# Patient Record
Sex: FEMALE | Race: ASIAN | NOT HISPANIC OR LATINO | Employment: FULL TIME | URBAN - METROPOLITAN AREA
[De-identification: names, ages, dates, MRNs, and addresses within clinical notes are randomized per-mention and may not be internally consistent; named-entity substitution may affect disease eponyms.]

---

## 2017-01-18 ENCOUNTER — OFFICE VISIT (OUTPATIENT)
Dept: FAMILY MEDICINE | Facility: CLINIC | Age: 27
End: 2017-01-18

## 2017-01-18 VITALS
SYSTOLIC BLOOD PRESSURE: 105 MMHG | BODY MASS INDEX: 26.39 KG/M2 | DIASTOLIC BLOOD PRESSURE: 71 MMHG | TEMPERATURE: 97.7 F | WEIGHT: 139.8 LBS | HEART RATE: 64 BPM | HEIGHT: 61 IN

## 2017-01-18 DIAGNOSIS — J30.2 SEASONAL ALLERGIC RHINITIS, UNSPECIFIED ALLERGIC RHINITIS TRIGGER: Primary | ICD-10-CM

## 2017-01-18 DIAGNOSIS — K03.6 BETEL DEPOSIT ON TEETH: ICD-10-CM

## 2017-01-18 NOTE — PROGRESS NOTES
"       HPI     Carlos Ivan is a 26 year old  female with a PMH significant for asthma who presents with pain underneath her chin. Pain has been present for about 2-3 months. This first began as a \"sore throat\" and then the pain moved to the area beneath her chin. Pain is brought on with pressure, when she grabs the skin under her chin and is sharp but does not radiate anywhere. Has not tried anything for it specifically, but has used Tylenol for headaches and has not noticed any effect on that pain. Pain has remained the same over the last 2 months.     She also has a cough, which has been present for a few years. She was diagnosed with asthma as a child and has an inhaler at home that she uses as needed. Cough usually occurs at bedtime and in the morning. Also has cough at night that will wake her 2-3 times per week. Cough is usually dry, sometimes produces white sputum. Sometimes uses 2 pillows at bedtime to help with cough. Has not had any fevers, chills, weight loss, rhinorrhea, nausea, vomiting, diarrhea, constipation, reflux. She has gone to another clinic in [place] that has done some pulmonary function tests. Was also seen in November 2016 for symptoms and was given a medication to take for 4 days that did not help. Does not recall the name of the medication but still has the packet at home. Does not recall if the ever created a type of asthma action plan.     Of note, patient does chew Betel nut almost daily.    ROS: 10 point ROS neg other than the symptoms noted above in the HPI.    PMH, Medications and Allergies were reviewed and updated as needed.     A Aura  was used for this visit.      Social History     Social History     Marital Status:      Spouse Name: N/A     Number of Children: 2     Years of Education: N/A     Social History Main Topics     Smoking status: Never Smoker      Smokeless tobacco: None     Alcohol Use: None     Drug Use: None     Sexual Activity: Not Asked     Other " "Topics Concern     None     Social History Narrative     None            Physical Exam:     Filed Vitals:    01/18/17 1333   BP: 105/71   Pulse: 64   Temp: 97.7  F (36.5  C)   Height: 5' 0.5\" (153.7 cm)   Weight: 139 lb 12.8 oz (63.413 kg)     Body mass index is 26.84 kg/(m^2).    Exam:  Constitutional: healthy, alert and no distress  Neck: Neck supple. No adenopathy. Thyroid symmetric, normal size, mobile with swallowing.  Eyes: EOMI, conjunctiva are clear.  ENT: No nasal discharge. Oropharynx clear with no erythema or exudates. Red staining along teeth.  Cardiovascular:RRR. No murmurs, clicks gallops or rub. No peripheral edema.  Respiratory:  Normal respiratory rate and rhythm, lungs clear to auscultation. No wheezes or crackles.  Abdomen: +BS, soft, nontender, nondistended.   Extremities: No C/C/E in BLE. 2+DP pulses.  Joint exam without erythema, effusion and full ROM throughout.  Skin: No rashes.  Psychiatric: mentation appears normal and affect normal/bright      Assessment and Plan     Carlos Ivan is a 26 year old female with a history of asthma who present with 2 months of pain underneath chin and intermittent cough that has been present for years.     Patient Instructions   -You may have allergies that are causing congestion.    -Doesn't look like an infection under your chin.    -Will give you Naproxen for your neck when it is giving you pain.    -Recommend staying away from Betel Nut.  It is real hard on your teeth and gums.  May cause Cancer.    -Take the Naproxen and Claritin for 2 weeks and see if it gets better.         Options for treatment and/or follow-up care were reviewed with the patient. Carlos Ivan was engaged and actively involved in the decision making process. She verbalized understanding of the options discussed and was satisfied with the final plan.    I acted as a scribe for Lito Berger MD during this visit.    Geeta Cedeño MS3        "

## 2017-01-18 NOTE — PROGRESS NOTES
The medical student acted as a scribe and the encounter documented above was performed completely by me and the documentation accurately reflects the work I have performed today. Lito Berger MD

## 2017-01-18 NOTE — PATIENT INSTRUCTIONS
-You may have allergies that are causing congestion.    -Doesn't look like an infection under your chin.    -Will give you Naproxen for your neck when it is giving you pain.    -Recommend staying away from Betel Nut.  It is real hard on your teeth and gums.  May cause Cancer.    -Take the Naproxen and Claritin for 2 weeks and see if it gets better.

## 2017-01-18 NOTE — MR AVS SNAPSHOT
After Visit Summary   2017    Carlos Ivan    MRN: [id number]           Patient Information     Date Of Birth          1990        Visit Information        Provider Department      2017 1:50 PM Lito Berger MD [facility]        Care Instructions    -You may have allergies that are causing congestion.    -Doesn't look like an infection under your chin.    -Will give you Naproxen for your neck when it is giving you pain.    -Recommend staying away from Betel Nut.  It is real hard on your teeth and gums.  May cause Cancer.    -Take the Naproxen and Claritin for 2 weeks and see if it gets better.          Follow-ups after your visit        Who to contact     Please call your clinic at [phone] to:    Ask questions about your health    Make or cancel appointments    Discuss your medicines    Learn about your test results    Speak to your doctor   If you have compliments or concerns about an experience at your clinic, or if you wish to file a complaint, please contact [facility] Patient Relations at [phone] or email us at [email or web address]         Additional Information About Your Visit        MyChart Information     TrueNorthLogic is an electronic gateway that provides easy, online access to your medical records. With TrueNorthLogic, you can request a clinic appointment, read your test results, renew a prescription or communicate with your care team.     To sign up for TrueNorthLogic visit the website at [email or web address]   You will be asked to enter the access code listed below, as well as some personal information. Please follow the directions to create your username and password.     Your access code is: [email or web address]  Expires: 2017  2:11 PM     Your access code will  in 90 days. If you need help or a new code, please contact your [facility] or call [phone] for assistance.        Care EveryWhere ID  "    This is your Care EveryWhere ID. This could be used by other organizations to access your [place] medical records  [id number]        Your Vitals Were     Pulse Temperature Height BMI (Body Mass Index)          64 97.7  F (36.5  C) 5' 0.5\" (153.7 cm) 26.84 kg/m2         Blood Pressure from Last 3 Encounters:   01/18/17 105/71    Weight from Last 3 Encounters:   01/18/17 139 lb 12.8 oz (63.413 kg)              Today, you had the following     No orders found for display       Primary Care Provider Office Phone # Fax #    Patricia Bee [phone]       [address] [address]        Thank you!     Thank you for choosing [facility]  for your care. Our goal is always to provide you with excellent care. Hearing back from our patients is one way we can continue to improve our services. Please take a few minutes to complete the written survey that you may receive in the mail after your visit with us. Thank you!             Your Updated Medication List - Protect others around you: Learn how to safely use, store and throw away your medicines at www.disposemymeds.org.      Notice  As of 1/18/2017  2:11 PM    You have not been prescribed any medications.      "

## 2017-01-20 RX ORDER — NAPROXEN 500 MG/1
500 TABLET ORAL 2 TIMES DAILY PRN
Qty: 60 TABLET | Refills: 1 | Status: SHIPPED | OUTPATIENT
Start: 2017-01-20 | End: 2017-02-17

## 2017-01-20 RX ORDER — LORATADINE 10 MG/1
10 TABLET ORAL DAILY
Qty: 30 TABLET | Refills: 1 | Status: SHIPPED | OUTPATIENT
Start: 2017-01-20 | End: 2017-11-30

## 2017-01-31 PROBLEM — J45.909 ASTHMA: Status: ACTIVE | Noted: 2017-01-31

## 2017-02-17 DIAGNOSIS — K03.6 BETEL DEPOSIT ON TEETH: ICD-10-CM

## 2017-02-20 RX ORDER — NAPROXEN 500 MG/1
500 TABLET ORAL 2 TIMES DAILY PRN
Qty: 60 TABLET | Refills: 1 | Status: SHIPPED
Start: 2017-02-20 | End: 2017-11-30

## 2017-03-22 ENCOUNTER — ALLIED HEALTH/NURSE VISIT (OUTPATIENT)
Dept: FAMILY MEDICINE | Facility: CLINIC | Age: 27
End: 2017-03-22

## 2017-03-22 VITALS
BODY MASS INDEX: 27.93 KG/M2 | TEMPERATURE: 98.3 F | HEART RATE: 88 BPM | DIASTOLIC BLOOD PRESSURE: 79 MMHG | SYSTOLIC BLOOD PRESSURE: 122 MMHG | WEIGHT: 145.4 LBS

## 2017-03-22 DIAGNOSIS — Z11.1 SCREENING EXAMINATION FOR PULMONARY TUBERCULOSIS: Primary | ICD-10-CM

## 2017-03-22 NOTE — NURSING NOTE
name: Kavon Baker  Language: Aura  Agency: [employer]  Phone number: [phone]    3/22/2017      Carlos Ka Paw  1990      Mantoux Placement:  Have you had a positive PPD/Mantoux before?No    Patient advised to avoid scratching area  Patient advised to return to clinic in 48-72 hours for interpretation.    Administered by . Melinda Webster      Mantoux result:  Lab Results   Component Value Date    PPD: REDNESS 5mm 03/24/2017    PPD:INDURATIO 0mm 03/24/2017

## 2017-03-22 NOTE — MR AVS SNAPSHOT
After Visit Summary   3/22/2017    Carlos Ivan    MRN: [id number]           Patient Information     Date Of Birth          1990        Visit Information        Provider Department      3/22/2017 10:00 AM Nurse, [facility]        Today's Diagnoses     Screening examination for pulmonary tuberculosis    -  1       Follow-ups after your visit        Follow-up notes from your care team     Return in about 2 days (around 3/24/2017).      Your next 10 appointments already scheduled     Mar 24, 2017 10:15 AM CDT   Nurse Visit with [facility] Nurse   [facility])    [address] [place] [id number]   [phone]              Who to contact     Please call your clinic at [phone] to:    Ask questions about your health    Make or cancel appointments    Discuss your medicines    Learn about your test results    Speak to your doctor   If you have compliments or concerns about an experience at your clinic, or if you wish to file a complaint, please contact [facility] Patient Relations at [phone] or email us at [email or web address]         Additional Information About Your Visit        MyChart Information     [employer] Are [employer]ters is an electronic gateway that provides easy, online access to your medical records. With [employer], you can request a clinic appointment, read your test results, renew a prescription or communicate with your care team.     To sign up for [employer] visit the website at [email or web address]   You will be asked to enter the access code listed below, as well as some personal information. Please follow the directions to create your username and password.     Your access code is: [email or web address]  Expires: 2017  3:11 PM     Your access code will  in 90 days. If you need help or a new code, please contact your [facility] or call [phone] for assistance.        Care EveryWhere ID      This is your Care EveryWhere ID. This could be used by other organizations to access your [place] medical records  [id number]        Your Vitals Were     Pulse Temperature BMI (Body Mass Index)             88 98.3  F (36.8  C) (Oral) 27.93 kg/m2          Blood Pressure from Last 3 Encounters:   03/22/17 122/79   01/18/17 105/71    Weight from Last 3 Encounters:   03/22/17 145 lb 6.4 oz (66 kg)   01/18/17 139 lb 12.8 oz (63.4 kg)              We Performed the Following     tuberculin (Mantoux, PPD) 5 UNIT/0.1ML ID injection (Charge)        Primary Care Provider Office Phone # Fax #    Patricia Bee [phone]       [address] [address]        Thank you!     Thank you for choosing [facility]  for your care. Our goal is always to provide you with excellent care. Hearing back from our patients is one way we can continue to improve our services. Please take a few minutes to complete the written survey that you may receive in the mail after your visit with us. Thank you!             Your Updated Medication List - Protect others around you: Learn how to safely use, store and throw away your medicines at www.disposemymeds.org.          This list is accurate as of: 3/22/17 10:00 AM.  Always use your most recent med list.                   Brand Name Dispense Instructions for use    loratadine 10 MG tablet    CLARITIN    30 tablet    Take 1 tablet (10 mg) by mouth daily       naproxen 500 MG tablet    NAPROSYN    60 tablet    Take 1 tablet (500 mg) by mouth 2 times daily as needed for moderate pain

## 2017-03-24 ENCOUNTER — ALLIED HEALTH/NURSE VISIT (OUTPATIENT)
Dept: FAMILY MEDICINE | Facility: CLINIC | Age: 27
End: 2017-03-24

## 2017-03-24 DIAGNOSIS — Z11.1 VISIT FOR MANTOUX TEST: Primary | ICD-10-CM

## 2017-03-24 LAB
PPDINDURATION: NORMAL MM (ref 0–5)
PPDREDNESS: NORMAL MM

## 2017-03-24 NOTE — MR AVS SNAPSHOT
After Visit Summary   3/24/2017    Carlos Ivan    MRN: [id number]           Patient Information     Date Of Birth          1990        Visit Information        Provider Department      3/24/2017 10:15 AM [facility]        Today's Diagnoses     Visit for Mantoux test    -  1       Follow-ups after your visit        Who to contact     Please call your clinic at [phone] to:    Ask questions about your health    Make or cancel appointments    Discuss your medicines    Learn about your test results    Speak to your doctor   If you have compliments or concerns about an experience at your clinic, or if you wish to file a complaint, please contact [facility] Patient Relations at [phone] or email us at [email or web address]         Additional Information About Your Visit        MyChart Information     silkfred is an electronic gateway that provides easy, online access to your medical records. With silkfred, you can request a clinic appointment, read your test results, renew a prescription or communicate with your care team.     To sign up for [employer] visit the website at [email or web address]   You will be asked to enter the access code listed below, as well as some personal information. Please follow the directions to create your username and password.     Your access code is: [email or web address]  Expires: 2017  3:11 PM     Your access code will  in 90 days. If you need help or a new code, please contact your [facility] or call [phone] for assistance.        Care EveryWhere ID     This is your Care EveryWhere ID. This could be used by other organizations to access your [place] medical records  [id number]         Blood Pressure from Last 3 Encounters:   17 122/79   17 105/71    Weight from Last 3 Encounters:   17 145 lb 6.4 oz (66 kg)   17 139 lb 12.8 oz (63.4 kg)               Today, you had the following     No orders found for display       Primary Care Provider Office Phone # Fax #    Patricia Bee [phone]       [address] [address]        Thank you!     Thank you for choosing [facility]  for your care. Our goal is always to provide you with excellent care. Hearing back from our patients is one way we can continue to improve our services. Please take a few minutes to complete the written survey that you may receive in the mail after your visit with us. Thank you!             Your Updated Medication List - Protect others around you: Learn how to safely use, store and throw away your medicines at www.disposemymeds.org.          This list is accurate as of: 3/24/17 10:27 AM.  Always use your most recent med list.                   Brand Name Dispense Instructions for use    loratadine 10 MG tablet    CLARITIN    30 tablet    Take 1 tablet (10 mg) by mouth daily       naproxen 500 MG tablet    NAPROSYN    60 tablet    Take 1 tablet (500 mg) by mouth 2 times daily as needed for moderate pain

## 2017-03-24 NOTE — NURSING NOTE
name: Kavon Baker  Language: Aura  Agency: [employer]  Phone number: [phone]        Patient here for PPD read.  Results can be found in original placement encounter.    November Moncho, NANCY

## 2017-07-20 ENCOUNTER — OFFICE VISIT (OUTPATIENT)
Dept: FAMILY MEDICINE | Facility: CLINIC | Age: 27
End: 2017-07-20

## 2017-07-20 VITALS
HEIGHT: 61 IN | BODY MASS INDEX: 27 KG/M2 | DIASTOLIC BLOOD PRESSURE: 65 MMHG | WEIGHT: 143 LBS | HEART RATE: 76 BPM | SYSTOLIC BLOOD PRESSURE: 97 MMHG

## 2017-07-20 DIAGNOSIS — M54.50 ACUTE BILATERAL LOW BACK PAIN WITHOUT SCIATICA: Primary | ICD-10-CM

## 2017-07-20 DIAGNOSIS — S56.911A ELBOW STRAIN, RIGHT, INITIAL ENCOUNTER: ICD-10-CM

## 2017-07-20 RX ORDER — ACETAMINOPHEN 325 MG/1
650 TABLET ORAL EVERY 4 HOURS PRN
Qty: 30 TABLET | Refills: 0 | Status: SHIPPED | OUTPATIENT
Start: 2017-07-20 | End: 2019-07-23

## 2017-07-20 NOTE — PATIENT INSTRUCTIONS
1) Ice 10 minutes three time a day until clear    2) Tylenol,  As needed.    3) Recheck in 4 weeks IF not clear.
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
## 2017-07-20 NOTE — MR AVS SNAPSHOT
After Visit Summary   2017    Carlos Ivan    MRN: [id number]           Patient Information     Date Of Birth          1990        Visit Information        Provider Department      2017 1:10 PM Jose Luis Gabriel MD [facility]        Today's Diagnoses     Acute bilateral low back pain without sciatica    -  1    Elbow strain, right, initial encounter          Care Instructions    1) Ice 10 minutes three time a day until clear    2) Tylenol,  As needed.    3) Recheck in 4 weeks IF not clear.          Follow-ups after your visit        Who to contact     Please call your clinic at [phone] to:    Ask questions about your health    Make or cancel appointments    Discuss your medicines    Learn about your test results    Speak to your doctor   If you have compliments or concerns about an experience at your clinic, or if you wish to file a complaint, please contact [facility] Patient Relations at [phone] or email us at [email or web address]         Additional Information About Your Visit        MyChart Information     Next 1 Interactive is an electronic gateway that provides easy, online access to your medical records. With Next 1 Interactive, you can request a clinic appointment, read your test results, renew a prescription or communicate with your care team.     To sign up for Next 1 Interactive visit the website at [email or web address]   You will be asked to enter the access code listed below, as well as some personal information. Please follow the directions to create your username and password.     Your access code is: [email or web address]  Expires: 10/18/2017  2:12 PM     Your access code will  in 90 days. If you need help or a new code, please contact your [facility] or call [phone] for assistance.        Care EveryWhere ID     This is your Care EveryWhere ID. This could be used by other organizations to access your [facility]  "records  [id number]        Your Vitals Were     Pulse Height BMI (Body Mass Index)             76 5' 0.5\" (153.7 cm) 27.47 kg/m2          Blood Pressure from Last 3 Encounters:   07/20/17 97/65   03/22/17 122/79   01/18/17 105/71    Weight from Last 3 Encounters:   07/20/17 143 lb (64.9 kg)   03/22/17 145 lb 6.4 oz (66 kg)   01/18/17 139 lb 12.8 oz (63.4 kg)              Today, you had the following     No orders found for display         Today's Medication Changes          These changes are accurate as of: 7/20/17  2:12 PM.  If you have any questions, ask your nurse or doctor.               Start taking these medicines.        Dose/Directions    acetaminophen 325 MG tablet   Commonly known as:  TYLENOL   Used for:  Elbow strain, right, initial encounter, Acute bilateral low back pain without sciatica        Dose:  650 mg   Take 2 tablets (650 mg) by mouth every 4 hours as needed for mild pain   Quantity:  30 tablet   Refills:  0            Where to get your medicines      These medications were sent to [facility] - [place], [place] - [address], [place] [address]     Phone:  [phone]     acetaminophen 325 MG tablet                Primary Care Provider Office Phone # Fax #    Patricia Bee [phone]       [address] [address]        Equal Access to Services     AGNES ROSA AH: Hadii madelaine garciao Sofunmilayo, waaxda luqadaha, qaybta kaalmada adeegyada, michelle easley . So [facility] [phone].    ATENCIÓN: Si habla español, tiene a rollins disposición servicios gratuitos de asistencia lingüística. Michelle al [phone].    We comply with applicable federal civil rights laws and [place] laws. We do not discriminate on the basis of race, color, national origin, age, disability sex, sexual orientation or gender identity.            Thank you!     Thank you for choosing [facility]  for your care. Our goal is always " to provide you with excellent care. Hearing back from our patients is one way we can continue to improve our services. Please take a few minutes to complete the written survey that you may receive in the mail after your visit with us. Thank you!             Your Updated Medication List - Protect others around you: Learn how to safely use, store and throw away your medicines at www.disposemymeds.org.          This list is accurate as of: 7/20/17  2:12 PM.  Always use your most recent med list.                   Brand Name Dispense Instructions for use Diagnosis    acetaminophen 325 MG tablet    TYLENOL    30 tablet    Take 2 tablets (650 mg) by mouth every 4 hours as needed for mild pain    Elbow strain, right, initial encounter, Acute bilateral low back pain without sciatica       loratadine 10 MG tablet    CLARITIN    30 tablet    Take 1 tablet (10 mg) by mouth daily    Seasonal allergic rhinitis, unspecified allergic rhinitis trigger       naproxen 500 MG tablet    NAPROSYN    60 tablet    Take 1 tablet (500 mg) by mouth 2 times daily as needed for moderate pain    Betel deposit on teeth

## 2017-08-31 ENCOUNTER — OFFICE VISIT (OUTPATIENT)
Dept: FAMILY MEDICINE | Facility: CLINIC | Age: 27
End: 2017-08-31

## 2017-08-31 VITALS
TEMPERATURE: 98.2 F | SYSTOLIC BLOOD PRESSURE: 114 MMHG | BODY MASS INDEX: 27.47 KG/M2 | WEIGHT: 143 LBS | DIASTOLIC BLOOD PRESSURE: 80 MMHG | HEART RATE: 66 BPM

## 2017-08-31 DIAGNOSIS — L30.8 OTHER ECZEMA: Primary | ICD-10-CM

## 2017-08-31 RX ORDER — TRIAMCINOLONE ACETONIDE 1 MG/G
CREAM TOPICAL
Qty: 15 G | Refills: 1 | Status: SHIPPED | OUTPATIENT
Start: 2017-08-31 | End: 2017-11-30

## 2017-08-31 NOTE — PROGRESS NOTES
There are no exam notes on file for this visit.  Chief Complaint   Patient presents with     Derm Problem     left ankle, rash x1 year, itchy     Blood pressure 114/80, pulse 66, temperature 98.2  F (36.8  C), temperature source Oral, weight 143 lb (64.9 kg).  Patient comes in today for a rash on her left ankle.     The patient tells me that the rash started as a small area and gradually has grown she was scratched this quite a bit.  It has been present for about a year.  It is still quite itchy, and so she does scratch it with some frequency.  She has not had a previously evaluated.  It has been relatively stable in size over the past 6-9 months.    Objective: This is a well-nourished well-developed female who is in no acute distress.  Vital signs are as noted above, and are within normal limits.  Examination of her skin reveals a 3 cm lesion with superficial crusting and scaling.  The lesion is definitely intradermal.    Assessment: Skin lesion of unclear etiology    Plan: With the itch, scratching, and itching, I wonder if this is an eczematous lesion.  I will treat this with triamcinolone cream.  If the lesion does not completely resolve within 2 weeks, the patient should return to clinic.  Discussed a skin biopsy with the patient and I believe that would be my next step in working up this lesion if it does not respond to the topical steroids.    An  was present throughout the visit.  The patient is actively involved in decision-making process and all of her questions answered to her satisfaction.    Other eczema  -     triamcinolone (KENALOG) 0.1 % cream; Apply sparingly to affected area two times daily for 14 days.

## 2017-08-31 NOTE — PATIENT INSTRUCTIONS
Thank you for coming to [facility].  Please come back in two weeks if the rash is not completely gone  The phone number we will call with results is # [phone] (home) . If this is not the best number please call our clinic and change the number.  If you need any refills please call your pharmacy and they will contact us.  If you have any concerns about today's visit or wish to schedule another appointment please call our office during normal business hours [phone] (8-5:00 M-F)  If you have urgent medical concerns please call [phone] at any time of the day.  If you a medical emergency please call [phone]  Again thank you for choosing [facility] and please let us know how we can best partner with you to improve you and your family's health.

## 2017-08-31 NOTE — MR AVS SNAPSHOT
After Visit Summary   8/31/2017    Carlos Ivan    MRN: [id number]           Patient Information     Date Of Birth          1990        Visit Information        Provider Department      8/31/2017 8:40 AM Dell Win MD [facility]        Today's Diagnoses     Other eczema    -  1      Care Instructions    Thank you for coming to [facility].  Please come back in two weeks if the rash is not completely gone  The phone number we will call with results is # [phone] (home) . If this is not the best number please call our clinic and change the number.  If you need any refills please call your pharmacy and they will contact us.  If you have any concerns about today's visit or wish to schedule another appointment please call our office during normal business hours [phone] (8-5:00 M-F)  If you have urgent medical concerns please call [phone] at any time of the day.  If you a medical emergency please call [phone]  Again thank you for choosing [facility] and please let us know how we can best partner with you to improve you and your family's health.                  Follow-ups after your visit        Who to contact     Please call your clinic at [phone] to:    Ask questions about your health    Make or cancel appointments    Discuss your medicines    Learn about your test results    Speak to your doctor   If you have compliments or concerns about an experience at your clinic, or if you wish to file a complaint, please contact [facility] Patient Relations at [phone] or email us at [email or web address]         Additional Information About Your Visit        MyChart Information     MyFuelUp is an electronic gateway that provides easy, online access to your medical records. With MyFuelUp, you can request a clinic appointment, read your test results, renew a prescription or communicate with your care team.     To sign up for MyFuelUp  visit the website at [email or web address]   You will be asked to enter the access code listed below, as well as some personal information. Please follow the directions to create your username and password.     Your access code is: [email or web address]  Expires: 10/18/2017  2:12 PM     Your access code will  in 90 days. If you need help or a new code, please contact your [facility] or call [phone] for assistance.        Care EveryWhere ID     This is your Care EveryWhere ID. This could be used by other organizations to access your [place] medical records  [id number]        Your Vitals Were     Pulse Temperature BMI (Body Mass Index)             66 98.2  F (36.8  C) (Oral) 27.47 kg/m2          Blood Pressure from Last 3 Encounters:   17 114/80   17 97/65   17 122/79    Weight from Last 3 Encounters:   17 143 lb (64.9 kg)   17 143 lb (64.9 kg)   17 145 lb 6.4 oz (66 kg)              Today, you had the following     No orders found for display         Today's Medication Changes          These changes are accurate as of: 17  9:01 AM.  If you have any questions, ask your nurse or doctor.               Start taking these medicines.        Dose/Directions    triamcinolone 0.1 % cream   Commonly known as:  KENALOG   Used for:  Other eczema   Started by:  Dell Win MD        Apply sparingly to affected area two times daily for 14 days.   Quantity:  15 g   Refills:  1            Where to get your medicines      These [facility] to [email or web address] - [place], [place] - [address], [place] [address]     Phone:  [phone]     triamcinolone 0.1 % cream                Primary Care Provider Office Phone # Fax #    Patricia Bee [phone]       [address] [address]        Equal Access to Services     AGNES ROSA AH: fna Clay  harjeet luhherve pridemichelle fisher. So [facility] [phone].    ATENCIÓN: Si rachel sinclair, tiene a rollins disposición servicios gratuitos de asistencia lingüística. Michelle al [phone].    We comply with applicable federal civil rights laws and [place] laws. We do not discriminate on the basis of race, color, national origin, age, disability sex, sexual orientation or gender identity.            Thank you!     Thank you for choosing [facility]  for your care. Our goal is always to provide you with excellent care. Hearing back from our patients is one way we can continue to improve our services. Please take a few minutes to complete the written survey that you may receive in the mail after your visit with us. Thank you!             Your Updated Medication List - Protect others around you: Learn how to safely use, store and throw away your medicines at www.disposemymeds.org.          This list is accurate as of: 8/31/17  9:01 AM.  Always use your most recent med list.                   Brand Name Dispense Instructions for use Diagnosis    acetaminophen 325 MG tablet    TYLENOL    30 tablet    Take 2 tablets (650 mg) by mouth every 4 hours as needed for mild pain    Elbow strain, right, initial encounter, Acute bilateral low back pain without sciatica       loratadine 10 MG tablet    CLARITIN    30 tablet    Take 1 tablet (10 mg) by mouth daily    Seasonal allergic rhinitis, unspecified allergic rhinitis trigger       naproxen 500 MG tablet    NAPROSYN    60 tablet    Take 1 tablet (500 mg) by mouth 2 times daily as needed for moderate pain    Betel deposit on teeth       triamcinolone 0.1 % cream    KENALOG    15 g    Apply sparingly to affected area two times daily for 14 days.    Other eczema

## 2017-10-18 ENCOUNTER — ALLIED HEALTH/NURSE VISIT (OUTPATIENT)
Dept: FAMILY MEDICINE | Facility: CLINIC | Age: 27
End: 2017-10-18

## 2017-10-18 VITALS — TEMPERATURE: 98.9 F

## 2017-10-18 DIAGNOSIS — Z23 NEED FOR VACCINATION: Primary | ICD-10-CM

## 2017-10-18 NOTE — NURSING NOTE
" name: Kavon Shashi Baker  Language: Aura  Agency: [employer]  Phone number: [phone]      Injectable Influenza Immunization Documentation    1.  Has the patient received the information for the injectable influenza vaccine? YES     2. Is the patient 6 months of age or older? YES     3. Does the patient have any of the following contraindications?         Severe allergy to eggs? No     Severe allergic reaction to previous influenza vaccines? No   Severe allergy to latex? No       History of Guillain-[place] syndrome? No     Currently have a temperature greater than 100.4F? No        4.  Severely egg allergic patients should have flu vaccine eligibility assessed by an MD, RN, or pharmacist, and those who received flu vaccine should be observed for 15 min by an MD, RN, Pharmacist, Medical Technician, or member of clinic staff.\": YES    5. Latex-allergic patients should be given latex-free influenza vaccine Yes. Please reference the Vaccine latex table to determine if your clinic s product is latex-containing.       Vaccination given by November Paw, RMA                "

## 2017-10-18 NOTE — MR AVS SNAPSHOT
After Visit Summary   10/18/2017    Carlos vIan    MRN: [id number]           Patient Information     Date Of Birth          1990        Visit Information        Provider Department      10/18/2017 11:00 AM [facility]        Today's Diagnoses     Need for vaccination    -  1       Follow-ups after your visit        Who to contact     Please call your clinic at [phone] to:    Ask questions about your health    Make or cancel appointments    Discuss your medicines    Learn about your test results    Speak to your doctor   If you have compliments or concerns about an experience at your clinic, or if you wish to file a complaint, please contact [facility] Patient Relations at [phone] or email us at [email or web address]         Additional Information About Your Visit        MyChart Information     Visterra is an electronic gateway that provides easy, online access to your medical records. With Visterra, you can request a clinic appointment, read your test results, renew a prescription or communicate with your care team.     To sign up for [employer] visit the website at [email or web address]   You will be asked to enter the access code listed below, as well as some personal information. Please follow the directions to create your username and password.     Your access code is: [email or web address]  Expires: 10/18/2017  2:12 PM     Your access code will  in 90 days. If you need help or a new code, please contact your [facility] or call [phone] for assistance.        Care EveryWhere ID     This is your Care EveryWhere ID. This could be used by other organizations to access your [place] medical records  [id number]        Your Vitals Were     Temperature                   98.9  F (37.2  C) (Tympanic)            Blood Pressure from Last 3 Encounters:   17 114/80   17 97/65   17  122/79    Weight from Last 3 Encounters:   08/31/17 143 lb (64.9 kg)   07/20/17 143 lb (64.9 kg)   03/22/17 145 lb 6.4 oz (66 kg)              We Performed the Following     ADMIN VACCINE, INITIAL     FLU VAC QUADRIVLENT SPLIT VIRUS IM 0.5ml dosage        Primary Care Provider Office Phone # Fax #    Patricia Bee [phone]       [place] [address]        Equal Access to Services     AGNES ROSA : Hadii aad ku hadasho Soomaali, waaxda luqadaha, qaybta kaalmada adeegyada, waxay idiin hayaan adeeg shantell easley . So [facility] [phone].    ATENCIÓN: Si habla español, tiene a rollins disposición servicios gratuitos de asistencia lingüística. Llame al [phone].    We comply with applicable federal civil rights laws and [place] laws. We do not discriminate on the basis of race, color, national origin, age, disability, sex, sexual orientation, or gender identity.            Thank you!     Thank you for choosing [facility]  for your care. Our goal is always to provide you with excellent care. Hearing back from our patients is one way we can continue to improve our services. Please take a few minutes to complete the written survey that you may receive in the mail after your visit with us. Thank you!             Your Updated Medication List - Protect others around you: Learn how to safely use, store and throw away your medicines at www.disposemymeds.org.          This list is accurate as of: 10/18/17 11:36 AM.  Always use your most recent med list.                   Brand Name Dispense Instructions for use Diagnosis    acetaminophen 325 MG tablet    TYLENOL    30 tablet    Take 2 tablets (650 mg) by mouth every 4 hours as needed for mild pain    Elbow strain, right, initial encounter, Acute bilateral low back pain without sciatica       loratadine 10 MG tablet    CLARITIN    30 tablet    Take 1 tablet (10 mg) by mouth daily    Seasonal allergic rhinitis, unspecified allergic  rhinitis trigger       naproxen 500 MG tablet    NAPROSYN    60 tablet    Take 1 tablet (500 mg) by mouth 2 times daily as needed for moderate pain    Betel deposit on teeth       triamcinolone 0.1 % cream    KENALOG    15 g    Apply sparingly to affected area two times daily for 14 days.    Other eczema

## 2017-11-30 ENCOUNTER — OFFICE VISIT (OUTPATIENT)
Dept: FAMILY MEDICINE | Facility: CLINIC | Age: 27
End: 2017-11-30

## 2017-11-30 VITALS
DIASTOLIC BLOOD PRESSURE: 69 MMHG | HEART RATE: 73 BPM | HEIGHT: 61 IN | TEMPERATURE: 98 F | WEIGHT: 142.8 LBS | SYSTOLIC BLOOD PRESSURE: 107 MMHG | BODY MASS INDEX: 26.96 KG/M2

## 2017-11-30 DIAGNOSIS — M77.11 LATERAL EPICONDYLITIS OF RIGHT ELBOW: ICD-10-CM

## 2017-11-30 DIAGNOSIS — Z00.00 ROUTINE GENERAL MEDICAL EXAMINATION AT A HEALTH CARE FACILITY: Primary | ICD-10-CM

## 2017-11-30 DIAGNOSIS — H57.9 ITCHY EYES: ICD-10-CM

## 2017-11-30 RX ORDER — NAPROXEN 500 MG/1
500 TABLET ORAL 2 TIMES DAILY PRN
Qty: 60 TABLET | Refills: 1 | Status: SHIPPED | OUTPATIENT
Start: 2017-11-30 | End: 2017-12-14
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
## 2017-11-30 NOTE — PATIENT INSTRUCTIONS
Naproxen - 2 times a day for 2 weeks.    Arm band from a medical supply store to help with the tendonopathy.  Try this for the next 4-6 weeks, and if not improving return for re-evaluation.      Preventive Health Recommendations  Female Ages 26 - 39  Yearly exam:   See your health care provider every year in order to    Review health changes.     Discuss preventive care.      Review your medicines if you your doctor has prescribed any.    Until age 30: Get a Pap test every three years (more often if you have had an abnormal result).    After age 30: Talk to your doctor about whether you should have a Pap test every 3 years or have a Pap test with HPV screening every 5 years.   You do not need a Pap test if your uterus was removed (hysterectomy) and you have not had cancer.  You should be tested each year for STDs (sexually transmitted diseases), if you're at risk.   Talk to your provider about how often to have your cholesterol checked.  If you are at risk for diabetes, you should have a diabetes test (fasting glucose).  Shots: Get a flu shot each year. Get a tetanus shot every 10 years.   Nutrition:     Eat at least 5 servings of fruits and vegetables each day.    Eat whole-grain bread, whole-wheat pasta and brown rice instead of white grains and rice.    Talk to your provider about Calcium and Vitamin D.     Lifestyle    Exercise at least 150 minutes a week (30 minutes a day, 5 days of the week). This will help you control your weight and prevent disease.    Limit alcohol to one drink per day.    No smoking.     Wear sunscreen to prevent skin cancer.    See your dentist every six months for an exam and cleaning.

## 2017-11-30 NOTE — PROGRESS NOTES
Preceptor attestation:  Patient seen and discussed with the resident. Assessment and plan reviewed with resident and agreed upon.  Supervising physician: Jasbir Chery  [facility]

## 2017-11-30 NOTE — MR AVS SNAPSHOT
After Visit Summary   11/30/2017    Carlos Ivan    MRN: [id number]           Patient Information     Date Of Birth          1990        Visit Information        Provider Department      11/30/2017 11:00 AM Rosa Thomas DO [facility]        Today's Diagnoses     Routine general medical examination at a health care facility    -  1    Betel deposit on teeth        Itchy eyes        Lateral epicondylitis of right elbow          Care Instructions    Naproxen - 2 times a day for 2 weeks.    Arm band from a medical supply store to help with the tendonopathy.  Try this for the next 4-6 weeks, and if not improving return for re-evaluation.      Preventive Health Recommendations  Female Ages 26 - 39  Yearly exam:   See your health care provider every year in order to    Review health changes.     Discuss preventive care.      Review your medicines if you your doctor has prescribed any.    Until age 30: Get a Pap test every three years (more often if you have had an abnormal result).    After age 30: Talk to your doctor about whether you should have a Pap test every 3 years or have a Pap test with HPV screening every 5 years.   You do not need a Pap test if your uterus was removed (hysterectomy) and you have not had cancer.  You should be tested each year for STDs (sexually transmitted diseases), if you're at risk.   Talk to your provider about how often to have your cholesterol checked.  If you are at risk for diabetes, you should have a diabetes test (fasting glucose).  Shots: Get a flu shot each year. Get a tetanus shot every 10 years.   Nutrition:     Eat at least 5 servings of fruits and vegetables each day.    Eat whole-grain bread, whole-wheat pasta and brown rice instead of white grains and rice.    Talk to your provider about Calcium and Vitamin D.     Lifestyle    Exercise at least 150 minutes a week (30 minutes a day, 5 days of the week). This will help you control your weight and  "prevent disease.    Limit alcohol to one drink per day.    No smoking.     Wear sunscreen to prevent skin cancer.    See your dentist every six months for an exam and cleaning.            Follow-ups after your visit        Who to contact     Please call your clinic at [phone] to:    Ask questions about your health    Make or cancel appointments    Discuss your medicines    Learn about your test results    Speak to your doctor   If you have compliments or concerns about an experience at your clinic, or if you wish to file a complaint, please contact [facility] Patient Relations at [phone] or email us at [email or web address]         Additional Information About Your Visit        [employer] Information     [employer] is an electronic gateway that provides easy, online access to your medical records. With Zigfu, you can request a clinic appointment, read your test results, renew a prescription or communicate with your care team.     To sign up for Z[employer] visit the website at [email or web address]   You will be asked to enter the access code listed below, as well as some personal information. Please follow the directions to create your username and password.     Your access code is: [email or web address]  Expires: 2018 11:40 AM     Your access code will  in 90 days. If you need help or a new code, please contact your [facility] or call [phone] for assistance.        Care EveryWhere ID     This is your Care EveryWhere ID. This could be used by other organizations to access your [facility] medical records  [id number]        Your Vitals Were     Pulse Temperature Height BMI (Body Mass Index)          73 98  F (36.7  C) (Oral) 5' 0.5\" (153.7 cm) 27.43 kg/m2         Blood Pressure from Last 3 Encounters:   17 107/69   17 114/80   17 97/65    Weight from Last 3 Encounters:   17 142 lb 12.8 oz (64.8 kg)   17 " 143 lb (64.9 kg)   07/20/17 143 lb (64.9 kg)              Today, you had the following     No orders found for display         Today's Medication Changes          These changes are accurate as of: 11/30/17 11:40 AM.  If you have any questions, ask your nurse or doctor.               Start taking these medicines.        Dose/Directions    order for DME   Used for:  Lateral epicondylitis of right elbow   Started by:  Rosa Thomas DO        Equipment being ordered: Tennis Elbow strap (right)   Quantity:  1 Units   Refills:  0       polyethylene glycol 0.4%- propylene glycol 0.3% 0.4-0.3 % Soln ophthalmic solution   Commonly known as:  LUBRICANT EYE DROPS   Used for:  Itchy eyes   Started by:  Rosa Thomas DO        Dose:  1 drop   Place 1 drop into both eyes every hour as needed for dry eyes   Quantity:  5 mL   Refills:  3         Stop taking these medicines if you haven't already. Please contact your care team if you have questions.     loratadine 10 MG tablet   Commonly known as:  CLARITIN   Stopped by:  Rosa Thomas DO           triamcinolone 0.1 % cream   Commonly known as:  KENALOG   Stopped by:  Rosa Thomas DO                Where to get your medicines      These medications were sent to [facility] - [place], [place] - [address], [place] [address]     Phone:  [phone]     naproxen 500 MG tablet    polyethylene glycol 0.4%- propylene glycol 0.3% 0.4-0.3 % Soln ophthalmic solution         Some of these will need a paper prescription and others can be bought over the counter.  Ask your nurse if you have questions.     Bring a paper prescription for each of these medications     order for DME                Primary Care Provider Office Phone # Fax #    Patricia Bee [phone]       [address] [address]        Equal Access to Services     AGNES ROSA : fan Clay qaybta  michelle guerreromara easley ah. So [facility] [phone].    ATENCIÓN: Si rachel sinclair, tiene a rollins disposición servicios gratuitos de asistencia lingüística. Michelle al [phone].    We comply with applicable federal civil rights laws and [place] laws. We do not discriminate on the basis of race, color, national origin, age, disability, sex, sexual orientation, or gender identity.            Thank you!     Thank you for choosing [facility]  for your care. Our goal is always to provide you with excellent care. Hearing back from our patients is one way we can continue to improve our services. Please take a few minutes to complete the written survey that you may receive in the mail after your visit with us. Thank you!             Your Updated Medication List - Protect others around you: Learn how to safely use, store and throw away your medicines at www.disposemymeds.org.          This list is accurate as of: 11/30/17 11:40 AM.  Always use your most recent med list.                   Brand Name Dispense Instructions for use Diagnosis    acetaminophen 325 MG tablet    TYLENOL    30 tablet    Take 2 tablets (650 mg) by mouth every 4 hours as needed for mild pain    Elbow strain, right, initial encounter, Acute bilateral low back pain without sciatica       naproxen 500 MG tablet    NAPROSYN    60 tablet    Take 1 tablet (500 mg) by mouth 2 times daily as needed for moderate pain    Lateral epicondylitis of right elbow       order for DME     1 Units    Equipment being ordered: Tennis Elbow strap (right)    Lateral epicondylitis of right elbow       polyethylene glycol 0.4%- propylene glycol 0.3% 0.4-0.3 % Soln ophthalmic solution    LUBRICANT EYE DROPS    5 mL    Place 1 drop into both eyes every hour as needed for dry eyes    Itchy eyes

## 2018-01-07 ENCOUNTER — HEALTH MAINTENANCE LETTER (OUTPATIENT)
Age: 28
End: 2018-01-07

## 2019-07-18 ENCOUNTER — OFFICE VISIT (OUTPATIENT)
Dept: FAMILY MEDICINE | Facility: CLINIC | Age: 29
End: 2019-07-18
Payer: COMMERCIAL

## 2019-07-18 VITALS
HEART RATE: 67 BPM | BODY MASS INDEX: 26.81 KG/M2 | SYSTOLIC BLOOD PRESSURE: 104 MMHG | TEMPERATURE: 98.3 F | DIASTOLIC BLOOD PRESSURE: 68 MMHG | WEIGHT: 139.6 LBS | RESPIRATION RATE: 16 BRPM | OXYGEN SATURATION: 100 %

## 2019-07-18 DIAGNOSIS — Z30.46 IMPLANTABLE SUBDERMAL CONTRACEPTIVE SURVEILLANCE: Primary | ICD-10-CM

## 2019-07-18 DIAGNOSIS — Z30.46 SURVEILLANCE OF PREVIOUSLY PRESCRIBED IMPLANTABLE SUBDERMAL CONTRACEPTIVE: ICD-10-CM

## 2019-07-18 NOTE — PATIENT INSTRUCTIONS
- Keep area dry and wrapped for the next day  - Call or return if any questions/concerns  - Remove in 3 years

## 2019-07-18 NOTE — PROGRESS NOTES
Preceptor attestation:  Vital signs reviewed: /68   Pulse 67   Temp 98.3  F (36.8  C) (Oral)   Resp 16   Wt 63.3 kg (139 lb 9.6 oz)   LMP  (LMP Unknown)   SpO2 100%   Breastfeeding? No   BMI 26.81 kg/m      Patient seen, evaluated, and discussed with the resident.  I have verified the content of the note, which accurately reflects my assessment of the patient and the plan of care.    I was present for and supervised the Nexplanon removal and insertion procedure.    Supervising physician: Meme Perez MD  [facility]

## 2019-07-18 NOTE — LETTER
WORK FORM    7/18/2019    Re: Carlos Ivan  1990      To Whom It May Concern:     Carlos Ivan was seen in clinic today.  She may return to work without restrictions on 7/19/19.  Please excuse her from work.         Restrictions:  None        Conrad Bond MD  7/18/2019 3:27 PM

## 2019-07-18 NOTE — NURSING NOTE
Due to patient being non-English speaking/uses sign language, an  was used for this visit. Only for face-to-face interpretation by an external agency, date and length of interpretation can be found on the scanned worksheet.       name: Kavon Baker  Language: Aura  Agency:  [employer]  Telephone number: [phone]  Type of interpretation:  Face-to-face, spoken

## 2019-07-18 NOTE — PROGRESS NOTES
[facility] Medicine Clinic Visit    Subjective:  Carlos Ivan is a 28 year old female with a PMHx significant for   Patient Active Problem List   Diagnosis     Betel deposit on teeth     Stillbirth     Asthma     Implantable subdermal contraceptive surveillance    who presents for Nexplanon removal and insertion.      Patient's current Nexplanon was placed on 7/21/2016, and she presents for Nexplanon removal and new insertion today.  Denies any questions or concerns.  Has had no known side effects, and no menses which she prefers.  Denies any acute symptoms, including current illness, fevers, chills, chest pain, trouble breathing, nausea, vomiting, vaginal bleeding/discharge, urinary symptoms.     Objective:  Vitals:    07/18/19 1443   BP: 104/68   Pulse: 67   Resp: 16   Temp: 98.3  F (36.8  C)   TempSrc: Oral   SpO2: 100%   Weight: 63.3 kg (139 lb 9.6 oz)     Body mass index is 26.81 kg/m .    GEN: NAD, healthy, alert  NECK: no LAD, masses  RESP: CTAB, no w/r/r  CV: RRR, nl S1/S2, no m/r/g, no peripheral edema  ABD: soft, NT/ND, no obvious hepatosplenomegaly/masses, no rebound, +BS throughout  MSK: no MSK defects noted, gait is age appropriate w/o ataxia  SKIN: no suspicious lesions or rashes. Nexplanon easily palpable in LUE before removal and after new insertion.   NEURO: no obvious focal deficits  PSYCH: mentation appears normal, affect normal/bright     Assessment/Plan:  Carlos was seen today for contraception.    Diagnoses and all orders for this visit:    Implantable subdermal contraceptive surveillance  Patient presents for Nexplanon removal and new insertion.  Consent obtained.  Procedures were uncomplicated.  See notes below.  Patient should return in 3 years for removal.  Routine cares reviewed.    Options for treatment and follow-up care were reviewed with the patient who was engaged and actively involved in the decision making process, verbalized understanding of the options discussed, and satisfied  with the final plan.    Patient was staffed with supervising physician, Dr. Perez.     Conrad Bond MD, PGY3  [facility]      Nexplanon removal procedure  - Current Nexplanon still in place within 3 years.  - Discussed issues that might be associated with removal of Nexplanon including infection, scarring, nerve damage, pain and bleeding.  - Consent obtained from patient for Nexplanon removal from the left arm.  - Timeout completed, patient's name and date of birth procedure type and location verified.  - Patient positioned with left arm and side to make it easier for exploration and removal.  - Implant located by palpation.  - Area prepared with Betadine x3, waited for this to desiccate.   - Using a 25-gauge, 1 inch needle, 2 cc of lidocaine with 1% epi was placed x2  - After adequate anesthesia was placed, using #15 disposable scalpel the implant grasped with mosquito forceps and adherent tissue was cleared.  - Pressure applied to area of incision with good hemostasis before reinsertion procedure.       Nexplanon insertion procedure  PRE-OP DIAGNOSIS: desired long-term, reversible contraception   POST-OP DIAGNOSIS: Same   PROCEDURE: Nexplanon   placement  Performing Physician: Conrad Bond MD  Supervising Physician: Meme Perez MD     PROCEDURE:   - Site: Left arm       NDC 5200-7419-98, Expiration Date: 2022  - Sterile Preparation: Betadine               - Insertion site was selected 8 - 10 cm from medial epicondyle, about 2cm inferior to prior Nexplanon insertion site  - Procedure area was prepped and draped in a sterile fashion. 3 mL of 1% lidocaine with epinephrine was used for subcutaneous anesthesia. Anesthesia confirmed.   - Nexplanon   trocar was inserted subcutaneously and then Nexplanon   capsule delivered subcutaneously  - Trocar was removed from the insertion site.  - Nexplanon   capsule was palpated by provider and patient to assure satisfactory placement.  - Estimated blood loss :  Minimal  - Dressings applied:  Steri strips  - Follow up: The patient tolerated the procedure well without complications.  Standard post-procedure care is explained and return precautions are given.

## 2019-07-19 PROBLEM — Z30.46 IMPLANTABLE SUBDERMAL CONTRACEPTIVE SURVEILLANCE: Status: ACTIVE | Noted: 2019-07-19

## 2021-08-14 ENCOUNTER — HEALTH MAINTENANCE LETTER (OUTPATIENT)
Age: 31
End: 2021-08-14

## 2021-10-09 ENCOUNTER — HEALTH MAINTENANCE LETTER (OUTPATIENT)
Age: 31
End: 2021-10-09

## 2022-07-21 ENCOUNTER — OFFICE VISIT (OUTPATIENT)
Dept: FAMILY MEDICINE | Facility: CLINIC | Age: 32
End: 2022-07-21
Payer: COMMERCIAL

## 2022-07-21 VITALS
HEART RATE: 64 BPM | OXYGEN SATURATION: 99 % | DIASTOLIC BLOOD PRESSURE: 81 MMHG | TEMPERATURE: 98.1 F | BODY MASS INDEX: 28.83 KG/M2 | SYSTOLIC BLOOD PRESSURE: 122 MMHG | RESPIRATION RATE: 16 BRPM | WEIGHT: 152.6 LBS

## 2022-07-21 DIAGNOSIS — Z30.46 NEXPLANON REMOVAL: Primary | ICD-10-CM

## 2022-07-21 PROCEDURE — 11982 REMOVE DRUG IMPLANT DEVICE: CPT | Mod: GC

## 2022-07-21 ASSESSMENT — ASTHMA QUESTIONNAIRES
QUESTION_1 LAST FOUR WEEKS HOW MUCH OF THE TIME DID YOUR ASTHMA KEEP YOU FROM GETTING AS MUCH DONE AT WORK, SCHOOL OR AT HOME: NONE OF THE TIME
QUESTION_5 LAST FOUR WEEKS HOW WOULD YOU RATE YOUR ASTHMA CONTROL: COMPLETELY CONTROLLED
ACT_TOTALSCORE: 25
ACT_TOTALSCORE: 25
QUESTION_2 LAST FOUR WEEKS HOW OFTEN HAVE YOU HAD SHORTNESS OF BREATH: NOT AT ALL
QUESTION_4 LAST FOUR WEEKS HOW OFTEN HAVE YOU USED YOUR RESCUE INHALER OR NEBULIZER MEDICATION (SUCH AS ALBUTEROL): NOT AT ALL
QUESTION_3 LAST FOUR WEEKS HOW OFTEN DID YOUR ASTHMA SYMPTOMS (WHEEZING, COUGHING, SHORTNESS OF BREATH, CHEST TIGHTNESS OR PAIN) WAKE YOU UP AT NIGHT OR EARLIER THAN USUAL IN THE MORNING: NOT AT ALL

## 2022-07-21 NOTE — NURSING NOTE
Due to patient being non-English speaking/uses sign language, an  was used for this visit. Only for face-to-face interpretation by an external agency, date and length of interpretation can be found on the scanned worksheet.     name: Ld Cuellar  Agency: [employer]  Language: Aura   Telephone number:   Type of interpretation: Face-to-face, spoken

## 2022-07-21 NOTE — PROGRESS NOTES
Preceptor Attestation:    I discussed the patient with the resident and evaluated the patient in person. I was present for and supervised the entire procedure. I have verified the content of the note, which accurately reflects my assessment of the patient and the plan of care.   Supervising Physician:  Joes Luis Gabriel MD.

## 2022-07-21 NOTE — LETTER
July 21, 2022      Carlos Gilmore Paw  [address]  [place] [address]              May concern:    Carlos was seen in my office today for evaluation.  Please excuse her from work today.  Please call my office if you have any questions comments or concerns.          Sincerely,      Unruly Rodríguez DO    
independent

## 2022-07-21 NOTE — PATIENT INSTRUCTIONS
Thank you for discussing your health today!    We discussed the following at this visit:    1) Nexplanon removal.  We discussed removing her Nexplanon which was a success today.  He decided to not replace it with any other contraception and okay with having children again.  Please return to the clinic if you would like to discuss any contraception planning in the future.  You are also due for a physical exam within the next few months.    Please call the clinic with any questions or concerns.    Unruly Rodríguez, DO

## 2022-07-21 NOTE — PROGRESS NOTES
Procedure Note-Nexplanon Removal    Carlos Ivan is here for removal of etonogestrel implant Nexplanon/Implanon    Indication: Completion of 3 years    Consent: Affirmation of informed consent was signed and scanned into the medical record. Risks, benefits and alternatives were discussed. Patient's questions were elicited and answered.   Procedure safety checklist was completed:  Yes  Time Out (Pause for the Cause) completed: Yes      Preoperative Diagnosis: etonogestrel implant  Postoperative Diagnosis: etonogestrel implant removed    Technique: On the left arm  Skin prep Betadine  Anesthesia 1% lidocaine  Procedure: Small incision (<5mm) was made at distal end of palpable implant, curved hemostat was used to isolate the implant and bring it to the incision, the fibrous capsule containing the implant  was incised and the Implant was removed intact.  EBL: minimal  Complications:  No  Tolerance:  Pt tolerated procedure well and was in stable condition.     Contraception was discussed and patient chose the following method: none, patient states that she is okay with having more children.  Her youngest one is currently 10 years old.  She is a G3, P2.      Follow up: Pt was instructed to call if bleeding, severe pain or foul smell.     Follow up in 2-4 weeks for complete physical.    Procedure was staffed and supervised with Dr. Nery Rodríguez DO PGY2  [facility]

## 2022-08-30 ENCOUNTER — TELEPHONE (OUTPATIENT)
Dept: FAMILY MEDICINE | Facility: CLINIC | Age: 32
End: 2022-08-30

## 2022-08-30 NOTE — TELEPHONE ENCOUNTER
Reason for Call:  Other appointment    Detailed comments: Carlos would like to set up an appointment for her first prenatal visit with an OB/GYN at [facility]. Pt requests to have a Aura  ahead of time before calling.    Phone Number Patient can be reached at: Cell number on file:    Telephone Information:   Mobile [phone]       Best Time: Anytime after 3pm     Can we leave a detailed message on this number? NO    Call taken on 8/30/2022 at 5:41 PM by Alie Corrigan

## 2022-09-11 ENCOUNTER — HEALTH MAINTENANCE LETTER (OUTPATIENT)
Age: 32
End: 2022-09-11

## 2022-09-12 PROBLEM — Z30.46 IMPLANTABLE SUBDERMAL CONTRACEPTIVE SURVEILLANCE: Status: RESOLVED | Noted: 2019-07-19 | Resolved: 2022-09-12

## 2022-09-12 NOTE — TELEPHONE ENCOUNTER
Patient returns call. Writer help patient schedule pregnancy confirmation with Dr. Lindo 9/13/22 - michele per TS.

## 2022-09-13 ENCOUNTER — OFFICE VISIT (OUTPATIENT)
Dept: FAMILY MEDICINE | Facility: CLINIC | Age: 32
End: 2022-09-13
Payer: COMMERCIAL

## 2022-09-13 VITALS
SYSTOLIC BLOOD PRESSURE: 102 MMHG | HEART RATE: 73 BPM | OXYGEN SATURATION: 99 % | WEIGHT: 144.5 LBS | BODY MASS INDEX: 27.28 KG/M2 | TEMPERATURE: 98.7 F | DIASTOLIC BLOOD PRESSURE: 78 MMHG | RESPIRATION RATE: 20 BRPM | HEIGHT: 61 IN

## 2022-09-13 DIAGNOSIS — Z32.01 PREGNANCY CONFIRMED BY POSITIVE URINE TEST: Primary | ICD-10-CM

## 2022-09-13 DIAGNOSIS — Z23 NEED FOR IMMUNIZATION AGAINST INFLUENZA: ICD-10-CM

## 2022-09-13 LAB — HCG UR QL: POSITIVE

## 2022-09-13 PROCEDURE — 99203 OFFICE O/P NEW LOW 30 MIN: CPT | Mod: 25 | Performed by: FAMILY MEDICINE

## 2022-09-13 PROCEDURE — 81025 URINE PREGNANCY TEST: CPT | Performed by: FAMILY MEDICINE

## 2022-09-13 PROCEDURE — 90686 IIV4 VACC NO PRSV 0.5 ML IM: CPT | Performed by: FAMILY MEDICINE

## 2022-09-13 PROCEDURE — 90471 IMMUNIZATION ADMIN: CPT | Performed by: FAMILY MEDICINE

## 2022-09-13 ASSESSMENT — ASTHMA QUESTIONNAIRES
QUESTION_1 LAST FOUR WEEKS HOW MUCH OF THE TIME DID YOUR ASTHMA KEEP YOU FROM GETTING AS MUCH DONE AT WORK, SCHOOL OR AT HOME: NONE OF THE TIME
QUESTION_4 LAST FOUR WEEKS HOW OFTEN HAVE YOU USED YOUR RESCUE INHALER OR NEBULIZER MEDICATION (SUCH AS ALBUTEROL): NOT AT ALL
QUESTION_5 LAST FOUR WEEKS HOW WOULD YOU RATE YOUR ASTHMA CONTROL: COMPLETELY CONTROLLED
ACT_TOTALSCORE: 25
ACT_TOTALSCORE: 25
QUESTION_2 LAST FOUR WEEKS HOW OFTEN HAVE YOU HAD SHORTNESS OF BREATH: NOT AT ALL
QUESTION_3 LAST FOUR WEEKS HOW OFTEN DID YOUR ASTHMA SYMPTOMS (WHEEZING, COUGHING, SHORTNESS OF BREATH, CHEST TIGHTNESS OR PAIN) WAKE YOU UP AT NIGHT OR EARLIER THAN USUAL IN THE MORNING: NOT AT ALL

## 2022-09-13 NOTE — PROGRESS NOTES
Assessment/Plan:     Carlos Ivan is a 31 year old  here for confirmation of pregnancy.    Pregnancy confirmed by positive urine test  Suspect she is about 8 weeks along, given Nexplanon was removed 2022 and she never had a period after that.  We will get her started on a prenatal vitamin, order ultrasound for dates.  She prefer to avoid transvaginal ultrasound so we will delay that ultrasound by couple of weeks.  Of note, she has a history of stillbirth with her previous pregnancies we will plan on MFM consultation during current pregnancy.  I have written out a work note for her because she missed work today and I am referring to clinic care coordination because she needs help getting her insurance switched to to her pregnancy and she can also use some help walking and with other resources.  - Prenatal Vit-Fe Fumarate-FA (PNV FOLIC ACID + IRON) 27-1 MG TABS  Dispense: 90 tablet; Refill: 3  - HCG qualitative urine  - US OB < 14 Weeks Single  - Primary Care - Care Coordination Referral    Need for immunization against influenza  - INFLUENZA VACCINE IM > 6 MONTHS VALENT IIV4 (AFLURIA/FLUZONE)       Medications were reviewed for safety in pregnancy.  Risk factors identified today: History of stillbirth as above.    Return to clinic in about  1 month for Initial OB Visit.    Subjective:     Carlos Ivan is a 31 year old female who presents for evaluation of positive pregnancy test.    She is a new patient to our clinic-previously seen at [facility] and [facility].  Records reviewed.    Nexplanon removed 22. Because she wanted to become pregnant.  Never had a period after.  Positive pregnancy test on .  Lost appetite, some nausea.      Not on any meds or vitamins.    Med hx: asthma and headache  OB Hx 2 normal pregnancies, 3rd pregnancy stillbirth.          Risk behaviors:    Tobacco use:  reports that she has never smoked. Her smokeless tobacco use includes chew.  Drug or alcohol use:  "no      Current Outpatient Medications:      Prenatal Vit-Fe Fumarate-FA (PNV FOLIC ACID + IRON) 27-1 MG TABS, Take 1 tablet by mouth daily, Disp: 90 tablet, Rfl: 3    OB History    Para Term  AB Living   3 3 3 0 0 2   SAB IAB Ectopic Multiple Live Births   0 0 0 0 2      # Outcome Date GA Lbr Raz/2nd Weight Sex Delivery Anes PTL Lv   3 Term 16 38w6d  2.858 kg (6 lb 4.8 oz)  Vag-Spont  N FD      Apgar1: 0  Apgar5: 0   2 Term 12 41w0d  3 kg (6 lb 9.8 oz) F  None N HENRIK   1 Term 09 40w0d  2.8 kg (6 lb 2.8 oz) F  None N HENRIK             Objective:     /78 (BP Location: Right arm, Patient Position: Sitting, Cuff Size: Adult Regular)   Pulse 73   Temp 98.7  F (37.1  C) (Oral)   Resp 20   Ht 1.54 m (5' 0.63\")   Wt 65.5 kg (144 lb 8 oz)   SpO2 99%   BMI 27.64 kg/m    Gen:  A&A, NAD.  Abd: Soft and nontender  FHT's: Attempted but not obtained  Point-of-care ultrasound showed single IUP but I could not see for certain a heartbeat.    Lab Review  Results for orders placed or performed in visit on 22   HCG qualitative urine   Result Value Ref Range    hCG Urine Qualitative Positive (A) Negative        Answers for HPI/ROS submitted by the patient on 2022  What is the reason for your visit today? : PREGNANCY COMFRIMATION  How many servings of fruits and vegetables do you eat daily?: 0-1  On average, how many sweetened beverages do you drink each day (Examples: soda, juice, sweet tea, etc.  Do NOT count diet or artificially sweetened beverages)?: 0  How many minutes a day do you exercise enough to make your heart beat faster?: 9 or less  How many days a week do you exercise enough to make your heart beat faster?: 3 or less  How many days per week do you miss taking your medication?: 0      "

## 2022-09-13 NOTE — LETTER
September 13, 2022      Carlos Ivan  [address]  [place] [address]        To Whom It May Concern:    Carlos Ivan was seen in our clinic this morning. Please excuse her absence.      Sincerely,        Tosin Lindo MD

## 2022-09-13 NOTE — PATIENT INSTRUCTIONS
Patient Education   HEALTHY PREGNANCY CARE: 6 to 10 WEEKS PREGNANT    Pregnancy is an important time for you to take care of yourself and your baby. There is much that you can do through simple things like nutrition and exercise that will help you achieve the best outcome possible.     Learn about the changes you and your baby will experience during pregnancy. Your baby's facial features, brain, spinal cord and internal organs are developing, and baby's heart is pumping blood. Due to hormonal changes, you may notice nausea, fatigue or breast tenderness.    Common Discomforts of Early Pregnancy  Your body goes through many changes during pregnancy. Some are noticeable like increased breast size or darkening of the color of the nipple, but some changes may cause discomfort like breast tenderness, urinary frequency, fatigue or nausea. If you have questions about the duration or severity of what you are experiencing, contact your midwife or physician for guidance.     Coping with nausea/morning sickness  Sip small amounts of water, juices, or shakes. Try drinking between meals, not with meals.   Eat 5 or 6 small meals a day. Try dry toast, crackers, or cereal when you first get up, and eat breakfast a little later.  Make buddy tea (sliced buddy root in hot water with honey). Sip a cup in the morning before getting up.  Avoid spicy, greasy, and fatty foods.   When you feel sick, open your windows or go for a short walk to get fresh air.   Try nausea wristbands. These help some women.   Call your midwife or physician if you cannot keep fluids down, or if vomiting persists. There are medications that can help.    Choose healthy foods and gain the recommended amount of weight for your size. If you have questions or follow a special diet, talk with your midwife or physician. You should take one prenatal vitamin daily.  If nausea is a problem, try taking only a folic acid supplement of 400-800mcg daily until the nausea  passes.    Follow safe guidelines for exercise. Low impact aerobic activities are generally okay during pregnancy. If you have a regular exercise routine, you should be able to continue it during pregnancy as long as it doesn't cause pain. Talk to your midwife or physician about your activity at your prenatal visits.    Things to Avoid During Pregnancy  A general principal to follow during pregnancy is to stay away from anything that is strong/bad smelling (gas, paint, fumes, etc), or known to cause problems for mom or baby  Smoking (self or others)  Alcohol   Pesticides  Caffeine   Soft cheeses  Fish with high mercury content (such as shark, swordfish, rachel mackerel, or tilefish)  Some over the counter meds (ask your midwife or physician before taking)  Changing the yecenia litter box    This is also a good time to think about genetic screening tests. These are tests done during pregnancy to look for possible problems with the baby. First trimester tests for Down's Syndrome, Trisomy 13 and 18 can be done as early as 10 weeks of pregnancy. Some testing can be done as late as 22 weeks of pregnancy, depending on the test. There are other tests that look for spinal defects, cystic fibrosis, Cortez-Sachs disease. Talk with your midwife or physician about testing.    Warning Signs  Watch for warning signs, such as   vaginal bleeding  fluid leaking from your vagina  severe abdominal pain  nausea and vomiting more than 4-5 times a day, or if you are unable to keep anything down  fever more than 100.4 degrees F.     Contact your midwife or physician if you have these or any other concerns. If it's after clinic hours, physician patients should call the [facility] Connection at [phone]); midwife patients should call their answering service at [phone].

## 2022-09-14 ENCOUNTER — PATIENT OUTREACH (OUTPATIENT)
Dept: CARE COORDINATION | Facility: CLINIC | Age: 32
End: 2022-09-14

## 2022-09-14 NOTE — PROGRESS NOTES
Clinic Care Coordination Contact  [facility]/Voicemail    Clinical Data: Care Coordinator Outreach  Outreach attempted x 1. CHW called and unable to reach due invalid contact in the chart and unable to leave message to call back.    Plan: Care Coordinator will try to reach patient again in 1-2 business days.

## 2022-09-15 ENCOUNTER — PATIENT OUTREACH (OUTPATIENT)
Dept: CARE COORDINATION | Facility: CLINIC | Age: 32
End: 2022-09-15

## 2022-09-15 NOTE — PROGRESS NOTES
Clinic Care Coordination Contact  [facility]    Clinical Data: Care Coordinator Outreach  Outreach attempted x 2. CHW unable to reach due to invalid contacts in patient's chart.    Plan: Care Coordinator will do no further outreaches at this time.

## 2022-10-14 ENCOUNTER — PRENATAL OFFICE VISIT (OUTPATIENT)
Dept: FAMILY MEDICINE | Facility: CLINIC | Age: 32
End: 2022-10-14
Payer: COMMERCIAL

## 2022-10-14 VITALS
RESPIRATION RATE: 16 BRPM | WEIGHT: 143.5 LBS | BODY MASS INDEX: 27.45 KG/M2 | SYSTOLIC BLOOD PRESSURE: 98 MMHG | OXYGEN SATURATION: 100 % | TEMPERATURE: 98 F | HEART RATE: 76 BPM | DIASTOLIC BLOOD PRESSURE: 62 MMHG

## 2022-10-14 DIAGNOSIS — J45.20 MILD INTERMITTENT ASTHMA WITHOUT COMPLICATION: ICD-10-CM

## 2022-10-14 DIAGNOSIS — Z34.81 ENCOUNTER FOR SUPERVISION OF OTHER NORMAL PREGNANCY IN FIRST TRIMESTER: Primary | ICD-10-CM

## 2022-10-14 DIAGNOSIS — Z87.59 HISTORY OF STILLBIRTH: ICD-10-CM

## 2022-10-14 DIAGNOSIS — O21.9 NAUSEA AND VOMITING DURING PREGNANCY: ICD-10-CM

## 2022-10-14 LAB
ALBUMIN UR-MCNC: NEGATIVE MG/DL
APPEARANCE UR: CLEAR
BILIRUB UR QL STRIP: NEGATIVE
COLOR UR AUTO: YELLOW
ERYTHROCYTE [DISTWIDTH] IN BLOOD BY AUTOMATED COUNT: 13.3 % (ref 10–15)
GLUCOSE UR STRIP-MCNC: NEGATIVE MG/DL
HCT VFR BLD AUTO: 36.3 % (ref 35–47)
HGB BLD-MCNC: 12 G/DL (ref 11.7–15.7)
HGB UR QL STRIP: NEGATIVE
KETONES UR STRIP-MCNC: NEGATIVE MG/DL
LEUKOCYTE ESTERASE UR QL STRIP: NEGATIVE
MCH RBC QN AUTO: 26.8 PG (ref 26.5–33)
MCHC RBC AUTO-ENTMCNC: 33.1 G/DL (ref 31.5–36.5)
MCV RBC AUTO: 81 FL (ref 78–100)
NITRATE UR QL: NEGATIVE
PH UR STRIP: 7 [PH] (ref 5–7)
PLATELET # BLD AUTO: 257 10E3/UL (ref 150–450)
RBC # BLD AUTO: 4.48 10E6/UL (ref 3.8–5.2)
SP GR UR STRIP: 1.02 (ref 1–1.03)
UROBILINOGEN UR STRIP-ACNC: 0.2 E.U./DL
WBC # BLD AUTO: 7 10E3/UL (ref 4–11)

## 2022-10-14 PROCEDURE — 86780 TREPONEMA PALLIDUM: CPT | Performed by: FAMILY MEDICINE

## 2022-10-14 PROCEDURE — 99207 PR FIRST OB VISIT: CPT | Performed by: FAMILY MEDICINE

## 2022-10-14 PROCEDURE — 86762 RUBELLA ANTIBODY: CPT | Performed by: FAMILY MEDICINE

## 2022-10-14 PROCEDURE — 81003 URINALYSIS AUTO W/O SCOPE: CPT | Performed by: FAMILY MEDICINE

## 2022-10-14 PROCEDURE — 87086 URINE CULTURE/COLONY COUNT: CPT | Performed by: FAMILY MEDICINE

## 2022-10-14 PROCEDURE — 99214 OFFICE O/P EST MOD 30 MIN: CPT | Performed by: FAMILY MEDICINE

## 2022-10-14 PROCEDURE — 85027 COMPLETE CBC AUTOMATED: CPT | Performed by: FAMILY MEDICINE

## 2022-10-14 PROCEDURE — 83655 ASSAY OF LEAD: CPT | Mod: 90 | Performed by: FAMILY MEDICINE

## 2022-10-14 PROCEDURE — 36415 COLL VENOUS BLD VENIPUNCTURE: CPT | Performed by: FAMILY MEDICINE

## 2022-10-14 PROCEDURE — 87340 HEPATITIS B SURFACE AG IA: CPT | Performed by: FAMILY MEDICINE

## 2022-10-14 PROCEDURE — 87389 HIV-1 AG W/HIV-1&-2 AB AG IA: CPT | Performed by: FAMILY MEDICINE

## 2022-10-14 PROCEDURE — 86803 HEPATITIS C AB TEST: CPT | Performed by: FAMILY MEDICINE

## 2022-10-14 PROCEDURE — 99000 SPECIMEN HANDLING OFFICE-LAB: CPT | Performed by: FAMILY MEDICINE

## 2022-10-14 RX ORDER — PYRIDOXINE HCL (VITAMIN B6) 25 MG
TABLET ORAL
Qty: 40 TABLET | Refills: 2 | Status: SHIPPED | OUTPATIENT
Start: 2022-10-14 | End: 2022-11-16

## 2022-10-14 NOTE — LETTER
My Asthma Action Plan    Name: Carlos Ivan   YOB: 1990  Date: 10/14/2022   My doctor: Tosin Lindo MD   My clinic: [facility]        My Rescue Medicine:   Albuterol inhaler (Proair/Ventolin/Proventil HFA)  2-4 puffs EVERY 4 HOURS as needed. Use a spacer if recommended by your provider.   My Asthma Severity:   Intermittent / Exercise Induced  Know your asthma triggers: upper respiratory infections             GREEN ZONE   Good Control    I feel good    No cough or wheeze    Can work, sleep and play without asthma symptoms       Take your asthma control medicine every day.     1. If exercise triggers your asthma, take your rescue medication    15 minutes before exercise or sports, and    During exercise if you have asthma symptoms  2. Spacer to use with inhaler: If you have a spacer, make sure to use it with your inhaler             YELLOW ZONE Getting Worse  I have ANY of these:    I do not feel good    Cough or wheeze    Chest feels tight    Wake up at night   1. Keep taking your Green Zone medications  2. Start taking your rescue medicine:    every 20 minutes for up to 1 hour. Then every 4 hours for 24-48 hours.  3. If you stay in the Yellow Zone for more than 12-24 hours, contact your doctor.  4. If you do not return to the Green Zone in 12-24 hours or you get worse, start taking your oral steroid medicine if prescribed by your provider.           RED ZONE Medical Alert - Get Help  I have ANY of these:    I feel awful    Medicine is not helping    Breathing getting harder    Trouble walking or talking    Nose opens wide to breathe       1. Take your rescue medicine NOW  2. If your provider has prescribed an oral steroid medicine, start taking it NOW  3. Call your doctor NOW  4. If you are still in the Red Zone after 20 minutes and you have not reached your doctor:    Take your rescue medicine again and    Call 911 or go to the emergency room right away    See your regular  doctor within 2 weeks of an Emergency Room or Urgent Care visit for follow-up treatment.          Annual Reminders:  Meet with Asthma Educator,  Flu Shot in the Fall, consider Pneumonia Vaccination for patients with asthma (aged 19 and older).    Pharmacy:    [facility] - [place], [place] - [phone]   [facility] - [place], [place] - [phone]     Electronically signed by Tosin Lindo MD   Date: 10/14/22                    Asthma Triggers  How To Control Things That Make Your Asthma Worse    Triggers are things that make your asthma worse.  Look at the list below to help you find your triggers and   what you can do about them. You can help prevent asthma flare-ups by staying away from your triggers.      Trigger                                                          What you can do   Cigarette Smoke  Tobacco smoke can make asthma worse. Do not allow smoking in your home, car or around you.  Be sure no one smokes at a child s day care or school.  If you smoke, ask your health care provider for ways to help you quit.  Ask family members to quit too.  Ask your health care provider for a referral to Quit Plan to help you quit smoking, or call 0-318-001-PLAN.     Colds, Flu, Bronchitis  These are common triggers of asthma. Wash your hands often.  Don t touch your eyes, nose or mouth.  Get a flu shot every year.     Dust Mites  These are tiny bugs that live in cloth or carpet. They are too small to see. Wash sheets and blankets in hot water every week.   Encase pillows and mattress in dust mite proof covers.  Avoid having carpet if you can. If you have carpet, vacuum weekly.   Use a dust mask and HEPA vacuum.   Pollen and Outdoor Mold  Some people are allergic to trees, grass, or weed pollen, or molds. Try to keep your windows closed.  Limit time out doors when pollen count is high.   Ask you health care provider about taking medicine during allergy season.     Animal Dander  Some people are allergic to  skin flakes, urine or saliva from pets with fur or feathers. Keep pets with fur or feathers out of your home.    If you can t keep the pet outdoors, then keep the pet out of your bedroom.  Keep the bedroom door closed.  Keep pets off cloth furniture and away from stuffed toys.     Mice, Rats, and Cockroaches  Some people are allergic to the waste from these pests.   Cover food and garbage.  Clean up spills and food crumbs.  Store grease in the refrigerator.   Keep food out of the bedroom.   Indoor Mold  This can be a trigger if your home has high moisture. Fix leaking faucets, pipes, or other sources of water.   Clean moldy surfaces.  Dehumidify basement if it is damp and smelly.   Smoke, Strong Odors, and Sprays  These can reduce air quality. Stay away from strong odors and sprays, such as perfume, powder, hair spray, paints, smoke incense, paint, cleaning products, candles and new carpet.   Exercise or Sports  Some people with asthma have this trigger. Be active!  Ask your doctor about taking medicine before sports or exercise to prevent symptoms.    Warm up for 5-10 minutes before and after sports or exercise.     Other Triggers of Asthma  Cold air:  Cover your nose and mouth with a scarf.  Sometimes laughing or crying can be a trigger.  Some medicines and food can trigger asthma.

## 2022-10-14 NOTE — PATIENT INSTRUCTIONS
Patient Education   HEALTHY PREGNANCY CARE: 10-14 WEEKS PREGNANT     By weeks 10 to 14 of your pregnancy, the placenta has formed inside your uterus. It may be possible to hear your baby's heartbeat with a doppler ultrasound device. Your baby's eyes can and do move. The arms and legs can bend.    The second trimester genetic screening tests for Down's Syndrome, Trisomy 18, and neural tube defects (which are known collectively as a quad screen) are done at 15 to 22 weeks. It's your choice whether to have these tests. You and your partner can talk to your midwife or physician about birth defects tests.    Consider breastfeeding for the healthiest way to feed your baby. Ask your midwife or physician for more information.     As your center of gravity and weight changes, use good body mechanics when changing positions and lifting. For example, use a straight back and your legs for support when lifting instead of bending over. Maintain good posture to prevent straining your muscles. Now is a good time to continue or restart your exercise program. Walking 30-60 minutes daily is an excellent way to keep fit. Yoga and swimming also offer many benefits.    The nausea and fatigue of early pregnancy have usually started to let up, so this is a good time to focus on nutrition. Consider attending a nutrition class. A healthy diet includes about 60 grams of protein each day (3-4 servings of dairy, 2-3 servings of meat/fish/poultry/nuts), 4-6 servings of whole grain foods, and 5-6 servings of fruits and vegetables. Remember to drink 6-8 glasses of water daily.    Watch for warning signs, such as   vaginal bleeding  fluid leaking from your vagina  severe abdominal pain  nausea and vomiting more than 4-5 times a day, or if you are unable to keep anything down  fever more than 100.4 degrees F.     Contact your midwife or physician at if you have these or any other concerns. If it's after clinic hours, physician patients should call  the [facility] Connection at [phone]); midwife patients should call their answering service at [phone].

## 2022-10-14 NOTE — PROGRESS NOTES
"PRENATAL VISIT:  INITIAL OB    Assessment /Plan     Carlos Ivan is a 31 year old  at 13w4d with an EDC of 2023, by Last Menstrual Period, here for Initial OB Appointment.    Carlos was seen today for ob.    Diagnoses and all orders for this visit:    Encounter for supervision of other normal pregnancy in first trimester  -     ABO/Rh type and screen; Future  -     Hepatitis B surface antigen; Future  -     CBC with platelets; Future  -     HIV Antigen Antibody Combo; Future  -     Rubella Antibody IgG; Future  -     Treponema Abs w Reflex to RPR and Titer; Future  -     Urine Culture Aerobic Bacterial; Future  -     Hepatitis C antibody; Future  -     *UA reflex to Microscopic; Future  -     Lead, Venous Blood; Future  -     Non Invasive Prenatal Test Cell Free DNA; Future  -     Primary Care - Care Coordination Referral; Future    Nausea and vomiting during pregnancy  -     pyridOXINE (VITAMIN B6) 25 MG tablet; Take 1-2 tablets 3 times daily as needed for nausea.    Mild intermittent asthma without complication  Dx on chart from previous.  Has need inhaler for long time (years?). Suspect she had wheezing with viral infection and may not have asthma, but did update Asthma Action Plan today.    History of stillbirth  induced for Intrauterine fetal demist at 38w6d.  Preg complicated by single umbulical artery and polyhydramnios.  NIPS obtained today.  Will plan MFM consult around 20 weeks.         Will use the following method to dates:  LMP for now.  Was supposed to have had ultrasound after last visit, but no one called her. Staff helping to schedule now.    Offered NonInvasive Prenatal Screening (NIPS/[facility]).  After discussion of risk/benefits/limitations, patient accepted NIPS      Notable risk factors:  no/limited English skills and history stillbirth (explained)\")    Preeclampsia risk factors:    High risk factors (1+): none    Moderate risk factors (2+): none  Based on her risk factors, Carlos is NOT at " high risk of preeclampsia. Low-dose aspirin prophylaxis is NOT recommended for prevention of preeclampsia.     Discussed orientation, general information, lifestyle, nutrition, exercise,warning signs, resources, lab testing, risk screening.  Questions answered.    Return to clinic in about 1 month for next OB visit.    36 minutes Time spent doing chart review, history and exam, documentation and further activities per the note     Subjective:    Carlos Ivan is a 31 year old  here today for her First Obstetrical Exam.     No one called her to schedule OB ultrasound  Appetite better, but still feels like vomiting after eating.  No burning, did have in past.    OB History    Para Term  AB Living   4 3 3 0 0 2   SAB IAB Ectopic Multiple Live Births   0 0 0 0 2      # Outcome Date GA Lbr Raz/2nd Weight Sex Delivery Anes PTL Lv   4 Current            3 Term 16 38w6d  2.858 kg (6 lb 4.8 oz)  Vag-Spont  N FD      Apgar1: 0  Apgar5: 0   2 Term 12 41w0d  3 kg (6 lb 9.8 oz) F  None N HENRIK   1 Term 09 40w0d  2.8 kg (6 lb 2.8 oz) F  None N HENRIK       Past Medical History:   Diagnosis Date     Implantable subdermal contraceptive surveillance 2019     History reviewed. No pertinent surgical history.  Social History     Tobacco Use     Smoking status: Never     Smokeless tobacco: Current     Types: Chew     Tobacco comments:     pt chews [place] with tobacco in it for more than 10 years   Vaping Use     Vaping Use: Never used   Substance Use Topics     Alcohol use: Yes     Comment: once in a while     Drug use: Never     Current Outpatient Medications   Medication Sig Dispense Refill     Prenatal Vit-Fe Fumarate-FA (PNV FOLIC ACID + IRON) 27-1 MG TABS Take 1 tablet by mouth daily 90 tablet 3     pyridOXINE (VITAMIN B6) 25 MG tablet Take 1-2 tablets 3 times daily as needed for nausea. 40 tablet 2     No Known Allergies    Family History   Problem Relation Age of Onset     Asthma Mother      Other  - See Comments Mother         unknown cause of death; was having trouble breathing     Migraines Sister      Migraines Brother      No Known Problems Daughter      Asthma Daughter        Objective:   Objective    Vitals:    10/14/22 1452   BP: 98/62   Pulse: 76   Resp: 16   Temp: 98  F (36.7  C)   TempSrc: Oral   SpO2: 100%   Weight: 65.1 kg (143 lb 8 oz)     Physical Exam:  General Appearance: Alert, cooperative, no distress, appears stated age  Head: Normocephalic, without obvious abnormality, atraumatic  Eyes: PERRL, conjunctiva/corneas clear, EOM's intact  Ears: Normal TM's and external ear canals, both ears  Throat: Lips, mucosa, and tongue normal; teeth and gums normal  Neck: Supple, symmetrical, trachea midline, no adenopathy;  thyroid: not enlarged, symmetric, no tenderness/mass/nodules  Back: Symmetric, no curvature, ROM normal, no CVA tenderness  Lungs: Clear to auscultation bilaterally, respirations unlabored  Heart: Regular rate and rhythm, S1 and S2 normal, no murmur, rub, or gallop.  Abdomen: Soft, non-tender, bowel sounds active all four quadrants,  no masses, no organomegaly  Pelvic: Declined  Extremities: Extremities normal, atraumatic, no cyanosis or edema  Skin: Skin color, texture, turgor normal, no rashes or lesions  Lymph nodes: Cervical, supraclavicular, and axillary nodes normal  Neurologic: Normal

## 2022-10-15 LAB
HBV SURFACE AG SERPL QL IA: NONREACTIVE
HCV AB SERPL QL IA: NONREACTIVE
HIV 1+2 AB+HIV1 P24 AG SERPL QL IA: NONREACTIVE
T PALLIDUM AB SER QL: NONREACTIVE

## 2022-10-16 LAB — BACTERIA UR CULT: NO GROWTH

## 2022-10-17 ENCOUNTER — HOSPITAL ENCOUNTER (OUTPATIENT)
Dept: ULTRASOUND IMAGING | Facility: HOSPITAL | Age: 32
Discharge: HOME | End: 2022-10-17
Attending: FAMILY MEDICINE | Admitting: FAMILY MEDICINE
Payer: COMMERCIAL

## 2022-10-17 DIAGNOSIS — Z32.01 PREGNANCY CONFIRMED BY POSITIVE URINE TEST: ICD-10-CM

## 2022-10-17 LAB
LEAD BLDV-MCNC: <2 UG/DL
RUBV IGG SERPL QL IA: 2.56 INDEX
RUBV IGG SERPL QL IA: POSITIVE

## 2022-10-17 PROCEDURE — 76801 OB US < 14 WKS SINGLE FETUS: CPT

## 2022-10-21 LAB — SCANNED LAB RESULT: NORMAL

## 2022-10-23 ENCOUNTER — TELEPHONE (OUTPATIENT)
Dept: FAMILY MEDICINE | Facility: CLINIC | Age: 32
End: 2022-10-23

## 2022-10-23 NOTE — TELEPHONE ENCOUNTER
Please call pt:  Pregnancy labs were all normal, including test from chromosomal abnormalities.  If they want to know baby's sex from blood test: Baby is a girl!

## 2022-10-23 NOTE — LETTER
October 25, 2022      [facility] Mando Ivan  [address]  [facility] [address]        Dear ,    We are writing to inform you of your test results.    Your test results fall within the expected range(s) or remain unchanged from previous results.  Please continue with current treatment plan.    If you would like to know the sex of the baby please contact the care team at [facility].     Resulted Orders   Hepatitis B surface antigen   Result Value Ref Range    Hepatitis B Surface Antigen Nonreactive Nonreactive   CBC with platelets   Result Value Ref Range    WBC Count 7.0 4.0 - 11.0 10e3/uL    RBC Count 4.48 3.80 - 5.20 10e6/uL    Hemoglobin 12.0 11.7 - 15.7 g/dL    Hematocrit 36.3 35.0 - 47.0 %    MCV 81 78 - 100 fL    MCH 26.8 26.5 - 33.0 pg    MCHC 33.1 31.5 - 36.5 g/dL    RDW 13.3 10.0 - 15.0 %    Platelet Count 257 150 - 450 10e3/uL   HIV Antigen Antibody Combo   Result Value Ref Range    HIV Antigen Antibody Combo Nonreactive Nonreactive      Comment:      HIV-1 p24 Ag & HIV-1/HIV-2 Ab Not Detected   Rubella Antibody IgG   Result Value Ref Range    Rubella Anayeli IgG Instrument Value 2.56 <0.90 Index    Rubella Antibody IgG Positive       Comment:      Suggests previous exposure or immunization and probable immunity.   Treponema Abs w Reflex to RPR and Titer   Result Value Ref Range    Treponema Antibody Total Nonreactive Nonreactive   Hepatitis C antibody   Result Value Ref Range    Hepatitis C Antibody Nonreactive Nonreactive    Narrative    Assay performance characteristics have not been established for newborns, infants, and children.   Lead, Venous Blood   Result Value Ref Range    Lead Venous Blood <2.0 <=4.9 ug/dL      Comment:      INTERPRETIVE INFORMATION: Lead, Blood (Venous)    Elevated results may be due to skin or collection-related   contamination, including the use of a noncertified   lead-free tube. If contamination concerns exist due to   elevated levels of blood lead, confirmation with  "a second   specimen collected in a certified lead-free tube is   recommended.    Information sources for blood lead reference intervals and   interpretive comments include the CDC's \"Childhood Lead   Poisoning Prevention: Recommended Actions Based on Blood   Lead Level\" and the \"Adult Blood Lead Epidemiology and   Surveillance: Reference Blood Lead Levels (BLLs) for Adults   in the [place].\" Thresholds and time intervals for retesting,   medical evaluation, and response vary by state and   regulatory body. Contact your State Department of Health   and/or applicable regulatory agency for specific guidance   on medical management recommendations.    This test was developed and its performance characteristics   determined by  [facility]. It has not been cleared or   approved by the U.S. Food and Drug Administration. This   test was performed in a CLIA-certified laboratory and is   intended for clinical purposes.         Group          Concentration   Comment    Children       3.5-19.9 ug/dL  Children under the age of 6                                 years are the most vulnerable                                 to the harmful effects of                                  lead exposure. Environmental                                  investigation and exposure                                  history to identify potential                                 sources of lead. Biological                                  and nutritional monitoring                                 are recommended. Follow-up                                  blood lead monitoring is                                  recommended.                                20-44.9 ug/dL   Lead hazard reduction and                                   prompt medical evaluation are                                 recommended. Contact a                                  Pediatric Environmental                                  Health Specialty Unit or                     "              poison control center for                                  guidance.                   Greater than    Critical. Immediate medical                  44.9 ug/dL      evaluation, including                                  detailed neurological exam is                                 recommended. Consider                                  chelation therapy when                                 symptoms of lead toxicity   are                                  present. Contact a Pediatric                                  Environmental Health                                  Specialty Unit or poison                                  control center for                                  assistance.    Adult          5-19.9 ug/dL    Medical removal is                                   recommended for pregnant                                  women or those who are trying                                 or may become pregnant.                                  Adverse health effects are                                  possible. Reduced lead                                  exposure and increased blood                                  lead monitoring are                                  recommended.                    20-69.9 ug/dL   Adverse health effects are                                  indicated. Medical removal                                  from lead exposure is                                  required by OSHA if blood                                  lead level exceeds 50 ug/dL.                                 Prompt medical evaluation is                                 recommended.                    Greater than    Critical. Immediate medical                  69.9 ug/dL      evaluation is recommended.                                   Consider chelation therapy                                 when symptoms of lead                                  toxicity are present.  Performed By: [facility]  [address]  [place] [address]  : Christian Palacio MD, PhD   Non Invasive Prenatal Test Cell Free DNA   Result Value Ref Range    See Scanned Result INVITAE NON-INVASIVE PRENATAL SCREENING-Scanned    Urine Culture Aerobic Bacterial   Result Value Ref Range    Culture No Growth    *UA reflex to Microscopic   Result Value Ref Range    Color Urine Yellow Colorless, Straw, Light Yellow, Yellow    Appearance Urine Clear Clear    Glucose Urine Negative Negative mg/dL    Bilirubin Urine Negative Negative    Ketones Urine Negative Negative mg/dL    Specific Gravity Urine 1.020 1.005 - 1.030    Blood Urine Negative Negative    pH Urine 7.0 5.0 - 7.0    Protein Albumin Urine Negative Negative mg/dL    Urobilinogen Urine 0.2 0.2, 1.0 E.U./dL    Nitrite Urine Negative Negative    Leukocyte Esterase Urine Negative Negative    Narrative    Microscopic not indicated       If you have any questions or concerns, please call the clinic at the number listed above.       Sincerely,

## 2022-10-24 PROBLEM — Z34.90 PREGNANT: Status: ACTIVE | Noted: 2022-10-24

## 2022-10-24 NOTE — TELEPHONE ENCOUNTER
Called pt in an attempt to relay message from Dr. Lindo. Could not reach pt and could not leave message either. Tried calling the spouse but it was not the correct number either. The VM stated that the phone number belongs to Felice Trotter. Writer will try again another time.    Dawit Durbin Cem Say, BSN RN  [facility]

## 2022-10-25 ENCOUNTER — PATIENT OUTREACH (OUTPATIENT)
Dept: CARE COORDINATION | Facility: CLINIC | Age: 32
End: 2022-10-25

## 2022-10-25 NOTE — PROGRESS NOTES
Clinic Care Coordination Contact  Community Health Worker Initial Outreach    CHW Initial Information Gathering:  Referral Source: PCP  Preferred Hospital: [facility]  [phone]  Preferred Urgent Care: [facility], [phone]  Current living arrangement:: I live in a private home with family  Type of residence:: Apartment  Community Resources: None  Supplies Currently Used at Home: None  Equipment Currently Used at Home: none  Informal Support system:: Family  No PCP office visit in Past Year: No  Transportation means:: Accessible car, Family, Medical transport  CHW Additional Questions  If ED/Hospital discharge, follow-up appointment scheduled as recommended?: N/A  Medication changes made following ED/Hospital discharge?: N/A  MyChart active?: No  Patient agreeable to assistance with activating MyChart?: No    Patient accepts CC: Yes. Patient scheduled for assessment with CCC RN on 11/16/2022 at 3:00 PM. Patient noted desire to discuss Pregnant; please ensure access to resources (WIC, Public Health Nurse, [facility]) and update insurance..     This patient referred to CCC the second time and CHW was unable to reach patient due to invalid contact in patient's chart. Patient's phone rings as faxing tone and other contact sounded belong to someone else. CHW consulted with CCC RN and agreed to schedule patient on the same day patient is coming to follow up with OB on 11/16/2022 at 3:00 PM. CCC RN will reach out to OB on that day to conduct initial assessment with patient.

## 2022-10-25 NOTE — TELEPHONE ENCOUNTER
This author attempted to contact patient at all available numbers but could not reach anyone due to the numbers given by patient being associated with fax numbers.     Completing task and sending letter since results are normal.

## 2022-11-16 ENCOUNTER — PRENATAL OFFICE VISIT (OUTPATIENT)
Dept: FAMILY MEDICINE | Facility: CLINIC | Age: 32
End: 2022-11-16
Payer: COMMERCIAL

## 2022-11-16 VITALS
OXYGEN SATURATION: 98 % | WEIGHT: 147 LBS | HEART RATE: 80 BPM | SYSTOLIC BLOOD PRESSURE: 94 MMHG | RESPIRATION RATE: 16 BRPM | BODY MASS INDEX: 27.75 KG/M2 | HEIGHT: 61 IN | TEMPERATURE: 98.2 F | DIASTOLIC BLOOD PRESSURE: 60 MMHG

## 2022-11-16 DIAGNOSIS — Z12.4 SCREENING FOR CERVICAL CANCER: ICD-10-CM

## 2022-11-16 DIAGNOSIS — Z87.59 HISTORY OF STILLBIRTH: ICD-10-CM

## 2022-11-16 DIAGNOSIS — K59.00 CONSTIPATION, UNSPECIFIED CONSTIPATION TYPE: ICD-10-CM

## 2022-11-16 DIAGNOSIS — Z34.82 ENCOUNTER FOR SUPERVISION OF OTHER NORMAL PREGNANCY IN SECOND TRIMESTER: Primary | ICD-10-CM

## 2022-11-16 PROCEDURE — 87491 CHLMYD TRACH DNA AMP PROBE: CPT | Performed by: FAMILY MEDICINE

## 2022-11-16 PROCEDURE — 99213 OFFICE O/P EST LOW 20 MIN: CPT | Performed by: FAMILY MEDICINE

## 2022-11-16 PROCEDURE — G0145 SCR C/V CYTO,THINLAYER,RESCR: HCPCS | Performed by: FAMILY MEDICINE

## 2022-11-16 PROCEDURE — 87591 N.GONORRHOEAE DNA AMP PROB: CPT | Performed by: FAMILY MEDICINE

## 2022-11-16 PROCEDURE — 87624 HPV HI-RISK TYP POOLED RSLT: CPT | Performed by: FAMILY MEDICINE

## 2022-11-16 RX ORDER — POLYETHYLENE GLYCOL 3350 17 G/17G
1 POWDER, FOR SOLUTION ORAL DAILY
Qty: 507 G | Refills: 11 | Status: ON HOLD | OUTPATIENT
Start: 2022-11-16 | End: 2023-04-29

## 2022-11-17 LAB
C TRACH DNA SPEC QL PROBE+SIG AMP: NEGATIVE
N GONORRHOEA DNA SPEC QL NAA+PROBE: NEGATIVE

## 2022-11-18 ENCOUNTER — PATIENT OUTREACH (OUTPATIENT)
Dept: NURSING | Facility: CLINIC | Age: 32
End: 2022-11-18
Payer: COMMERCIAL

## 2022-11-18 DIAGNOSIS — Z87.59 HISTORY OF STILLBIRTH: Primary | ICD-10-CM

## 2022-11-18 NOTE — PROGRESS NOTES
Clinic Care Coordination Contact  CCRN spoke with patient briefly today via phone. Patient states she's at work and won't be back home until 4pm. Patient works Monday through Friday. Patient requested to call her on 11/23/2022 after 3:30pm.     Plan: CCRN plans to call patient on 11/23/2022 at 3:30pm as requested.

## 2022-11-21 LAB
BKR LAB AP GYN ADEQUACY: NORMAL
BKR LAB AP GYN INTERPRETATION: NORMAL
BKR LAB AP HPV REFLEX: NORMAL
BKR LAB AP PREVIOUS ABNORMAL: NORMAL
PATH REPORT.COMMENTS IMP SPEC: NORMAL
PATH REPORT.COMMENTS IMP SPEC: NORMAL
PATH REPORT.RELEVANT HX SPEC: NORMAL

## 2022-11-23 LAB
HUMAN PAPILLOMA VIRUS 16 DNA: NEGATIVE
HUMAN PAPILLOMA VIRUS 18 DNA: NEGATIVE
HUMAN PAPILLOMA VIRUS FINAL DIAGNOSIS: NORMAL
HUMAN PAPILLOMA VIRUS OTHER HR: NEGATIVE

## 2022-11-25 ENCOUNTER — PATIENT OUTREACH (OUTPATIENT)
Dept: CARE COORDINATION | Facility: CLINIC | Age: 32
End: 2022-11-25

## 2022-11-25 NOTE — PROGRESS NOTES
Clinic Care Coordination Contact  CCRN wasn't able to reach patient on 11/23/2022 x2 even calling after 4pm. CCRN plans to call patient again on 12/2/2022 or sooner if patient returns call back.      Breath sounds are clear, no distress present, no wheeze, rales, rhonchi or tachypnea. Normal rate and effort.

## 2022-12-02 ENCOUNTER — PATIENT OUTREACH (OUTPATIENT)
Dept: NURSING | Facility: CLINIC | Age: 32
End: 2022-12-02
Payer: COMMERCIAL

## 2022-12-02 ASSESSMENT — ACTIVITIES OF DAILY LIVING (ADL): DEPENDENT_IADLS:: INDEPENDENT

## 2022-12-02 NOTE — PROGRESS NOTES
Clinic Care Coordination Contact    Clinic Care Coordination Contact  OUTREACH    Referral Information:  Referral Source: PCP    Primary Diagnosis: Pregnancy    Chief Complaint   Patient presents with     Clinic Care Coordination - Initial     Clinic Utilization  Difficulty keeping appointments:: No  Compliance Concerns: No  No-Show Concerns: No  No PCP office visit in Past Year: No  Utilization    Hospital Admissions  0             ED Visits  0             No Show Count (past year)  5                Current as of: 12/2/2022  9:35 AM              Clinical Concerns:  CCRN completed initial assessment with patient via phone today. Patient was referral to CCC by PCP to assist with resources. Per patient, she's currently pregnant with her 4th child. Her EDC is 4/21/023. She has 11 yrs old daughter, and 10 yrs old daughters. Had 1 stillborn 6 year ago. Patient lives in apartment with spouse and 2 daughters. Patient works full time at [employer]. Goes to work 5:30am - 3:30pm. Spouse works as a PCA 8 hours per day for his dad at [employer], [place]. [place] came [place] and she's not a [place] citizen.       [place] citizen   - States she tries to apply once but due to covid, she didn't know what happen to her case.   - She would like to address this after delivery in May, 2023.   - Came to [place] since 2014.      Health Insurance   -Patient would like to apply for Medicaid. Currently, she has health insurance through work.   - She plans to quit her job at the end of Feb, 2023.   - Patient would like to like to explore if she could apply for MA now and stop her health plan with her current job.   - CCRN consulted with VINCE Gallegos. Rosy plans to assist patient with the process to explore if this option.       WIC  - already established with WIC    Would like to address overdue care gaps after she delivers her baby    Pain  Pain (GOAL):: No  Health Maintenance Reviewed: Due/Overdue   Clinical Pathway: None    Medication  Management:  Medication review status: Medications reviewed and no changes reported per patient.             Functional Status:  Dependent ADLs:: Independent  Dependent IADLs:: Independent  Bed or wheelchair confined:: No  Mobility Status: Independent  Fallen 2 or more times in the past year?: No  Any fall with injury in the past year?: No    Living Situation:  Current living arrangement:: I live in a private home with family  Type of residence:: Apartment    Lifestyle & Psychosocial Needs:    Social Determinants of Health     Tobacco Use: High Risk     Smoking Tobacco Use: Never     Smokeless Tobacco Use: Current     Passive Exposure: Not on file   Alcohol Use: Not on file   Financial Resource Strain: Not on file   Food Insecurity: Not on file   Transportation Needs: Not on file   Physical Activity: Not on file   Stress: Not on file   Social Connections: Not on file   Intimate Partner Violence: Not on file   Depression: Not at risk     PHQ-2 Score: 1   Housing Stability: Not on file     Diet:: Regular  Inadequate nutrition (GOAL):: No  Tube Feeding: No  Inadequate activity/exercise (GOAL):: Yes  Significant changes in sleep pattern (GOAL): Yes  Transportation means:: Accessible car, Family, Medical transport     Holiness or spiritual beliefs that impact treatment:: No  Mental health DX:: No  Mental health management concern (GOAL):: No  Chemical Dependency Status: No Current Concerns  Informal Support system:: Family, Friends, Olivia based, Spouse      Resources and Interventions:  Current Resources:      Community Resources: WIC  Supplies Currently Used at Home: None  Equipment Currently Used at Home: none  Employment Status: employed full-time    Advance Care Plan/Directive  Advanced Care Plans/Directives on file:: No  Advanced Care Plan/Directive Status: Declined Further Information    Referrals Placed: None    Care Plan:        Outreach Frequency: 6 weeks  Future Appointments              Today PHONE,  ; S[facility] CCC RN M [facility], [facility]    In 1 week [facility] MFM US 2 M [facility] Fetal Medicine [facility], [facility]    In 1 week [facility] EXAM RM 1/NST M [facility] Maternal Fetal Medicine [facility], [facility]    In 2 weeks Tosin Lindo MD [facility], [facility]    In 1 month Tosin Lindo MD [facility], [facility]    In 2 months Tosin Lindo MD [facility], [facility]    In 2 months Tosin Lindo MD [facility], [facility]    In 3 months Tosin Lindo MD [facility], [facility]    In 3 months ShockTosin martinez MD [facility], [facility]    In 3 months Tosin Lindo MD [facility] [place]    In 4 months Geeta Castro MD [facility], [facility]    In 4 months Tosin Lindo MD [facility], [facility]    In 4 months Tosin Lindo MD [facility], [facility]    In 4 months ShockTosin martinez MD [facility], [facility]    In 5 months Tosin Lindo MD [facility], [facility]    In 6 months Tosin Lindo MD [facility]          Plan:   1) CHW to assist patient call work to see if she could stop her insurance and would like to apply for MA instead. She plans to stop working at the end of Feb, 2022.

## 2022-12-02 NOTE — PROGRESS NOTES
"Assessment/Plan:   32 year old  at 17w5dd    Encounter for supervision of other normal pregnancy in second trimester  - Chlamydia trachomatis/Neisseria gonorrhoeae by PCR    Screening for cervical cancer  - Pap screen with HPV - recommended age 30 - 65 years  - HPV Hold (Lab Only)  - HPV High Risk Types DNA Cervical    Constipation, unspecified constipation type  Doscissed diet to improve; ok miralax as well  - polyethylene glycol (MIRALAX) 17 GM/Dose powder  Dispense: 507 g; Refill: 11    History of stillbirth  - [facility] Referral - Pregnancy       Return in 1 month (on 2022) for prenatal care.  Subjective:   Carlos Ivan is a 32 year old  at 20w0d who is seen for routine prenatal care.   Doing well.  Endorses normal fetal movement.  Having constipation  Objective   Vitals: BP 94/60   Pulse 80   Temp 98.2  F (36.8  C) (Oral)   Resp 16   Ht 1.54 m (5' 0.63\")   Wt 66.7 kg (147 lb)   LMP 2022 (Approximate)   SpO2 98%   BMI 28.12 kg/m     Total weight gain:  Not found.   Exam: Per flowsheet     "

## 2022-12-05 ENCOUNTER — PRE VISIT (OUTPATIENT)
Dept: MATERNAL FETAL MEDICINE | Facility: HOSPITAL | Age: 32
End: 2022-12-05

## 2022-12-08 ENCOUNTER — PATIENT OUTREACH (OUTPATIENT)
Dept: CARE COORDINATION | Facility: CLINIC | Age: 32
End: 2022-12-08

## 2022-12-08 NOTE — PROGRESS NOTES
Clinic Care Coordination Contact  [facility]/Voicemail    Clinical Data: Care Coordinator Outreach  Outreach attempted x 1. CHW called patient 2x, unab to reach nor able to leave a voice message for patient to return call due to voice mail not set up.      Plan: Care Coordinator will try to reach patient again in two weeks when patient gets home after 3PM.

## 2022-12-09 ENCOUNTER — TRANSFERRED RECORDS (OUTPATIENT)
Dept: HEALTH INFORMATION MANAGEMENT | Facility: CLINIC | Age: 32
End: 2022-12-09

## 2022-12-09 ENCOUNTER — ANCILLARY PROCEDURE (OUTPATIENT)
Dept: ULTRASOUND IMAGING | Facility: HOSPITAL | Age: 32
End: 2022-12-09
Attending: OBSTETRICS & GYNECOLOGY
Payer: COMMERCIAL

## 2022-12-09 ENCOUNTER — OFFICE VISIT (OUTPATIENT)
Dept: MATERNAL FETAL MEDICINE | Facility: HOSPITAL | Age: 32
End: 2022-12-09
Attending: OBSTETRICS & GYNECOLOGY
Payer: COMMERCIAL

## 2022-12-09 VITALS
TEMPERATURE: 98.1 F | OXYGEN SATURATION: 100 % | DIASTOLIC BLOOD PRESSURE: 68 MMHG | SYSTOLIC BLOOD PRESSURE: 107 MMHG | HEART RATE: 73 BPM

## 2022-12-09 DIAGNOSIS — Z87.59 HISTORY OF STILLBIRTH: ICD-10-CM

## 2022-12-09 PROCEDURE — 76811 OB US DETAILED SNGL FETUS: CPT

## 2022-12-09 PROCEDURE — 76811 OB US DETAILED SNGL FETUS: CPT | Mod: 26 | Performed by: STUDENT IN AN ORGANIZED HEALTH CARE EDUCATION/TRAINING PROGRAM

## 2022-12-09 PROCEDURE — 99204 OFFICE O/P NEW MOD 45 MIN: CPT | Mod: 25 | Performed by: STUDENT IN AN ORGANIZED HEALTH CARE EDUCATION/TRAINING PROGRAM

## 2022-12-09 PROCEDURE — 99211 OFF/OP EST MAY X REQ PHY/QHP: CPT | Performed by: STUDENT IN AN ORGANIZED HEALTH CARE EDUCATION/TRAINING PROGRAM

## 2022-12-09 NOTE — PROGRESS NOTES
Maternal-Fetal Medicine Consultation    Carlos Ivan  : 1990  MRN: [id number]    REFERRAL:  Carlos Ivan is a 32 year old sent by Dr. Lindo from [facility] for MFM consultation.    HPI:  Carlos Ivan is a 32 year old  at 21w0d by 13w3d US here for MFM consultation regarding her prior history of fetal demise at 38w6d.  This visit was completed with the assistance of an [employer] .    Per chart review, she had a previous fetal demise at 38w6d.  The pregnancy was complicated by a prenatal diagnosis of single umbilical artery and severe polyhydramnios, reaching a level of 51 cm.  Otherwise, she had a diagnosis of iron deficiency anemia, but no documented problems with hypertension or diabetes in the pregnancy.  She was being monitored via weekly biophysical profiles due to SUA starting at 32 weeks gestation. On initial  surveillance she was noted to have polyhydramnios that progressively worsened.  No other structural anomalies were identified based on review of the chart.      She presented to her clinic at 38w5d and was noted to have a fetal demise.  Work-up was completed at the time of her admission for induction of labor.  Infectious testing for CMV, TOxo, and Parvovirus were unremarkable. Abruption work-up was also reassuring with normal fibrinogen, inr/pt/ptt, and KB.  Placental cultures were negative.  TSH was within normal limits. UDS was overall unremarkable. She ultimately declined autopsy or genetics. The fetus was noted to be well grown (6Ib 4.8oz)  Placental pathology was remarkable for a mature placenta of normal weight with a velamentous cord insertion of a 2 vessel cord.  In addition, there was evidence of subchorionic thrombus at the umbilical cord insertion site.    Today, she is here for a comprehensive anatomy ultrasound in her subsequent pregnancy.    Obstetrics History:  OB History    Para Term  AB Living   4 3 3 0 0 2   SAB IAB Ectopic  Multiple Live Births   0 0 0 0 2      # Outcome Date GA Lbr Raz/2nd Weight Sex Delivery Anes PTL Lv   4 Current            3 Term 06/09/16 38w6d  2.858 kg (6 lb 4.8 oz)  Vag-Spont  N FD      Apgar1: 0  Apgar5: 0   2 Term 07/29/12 41w0d  3 kg (6 lb 9.8 oz) F  None N HENRIK   1 Term 03/27/09 40w0d  2.8 kg (6 lb 2.8 oz) F  None N HENRIK       Past Medical History:  Past Medical History:   Diagnosis Date     Implantable subdermal contraceptive surveillance 7/19/2019       Past Surgical History:  No past surgical history on file.    Current Medications:  Prior to Admission medications    Medication Sig Last Dose Taking? Auth Provider Long Term End Date   polyethylene glycol (MIRALAX) 17 GM/Dose powder Take 17 g (1 capful) by mouth daily   Tosin Lindo MD     Prenatal Vit-Fe Fumarate-FA (PNV FOLIC ACID + IRON) 27-1 MG TABS Take 1 tablet by mouth daily   Tosin Lindo MD         Allergies:  Patient has no known allergies.    Social History:   Social History     Socioeconomic History     Marital status:      Spouse name: Not on file     Number of children: Not on file     Years of education: Not on file     Highest education level: Not on file   Occupational History     Not on file   Tobacco Use     Smoking status: Never     Smokeless tobacco: Current     Types: Chew     Tobacco comments:     pt chews [place] with tobacco in it for more than 10 years   Vaping Use     Vaping Use: Never used   Substance and Sexual Activity     Alcohol use: Yes     Comment: once in a while     Drug use: Never     Sexual activity: Yes     Partners: Male     Birth control/protection: Implant   Other Topics Concern     Not on file   Social History Narrative     Not on file     Social Determinants of Health     Financial Resource Strain: Not on file   Food Insecurity: Not on file   Transportation Needs: Not on file   Physical Activity: Not on file   Stress: Not on file   Social Connections: Not on file   Intimate Partner Violence: Not on  "file   Housing Stability: Not on file       Family History:  Family History   Problem Relation Age of Onset     Asthma Mother      Other - See Comments Mother         unknown cause of death; was having trouble breathing     Migraines Sister      Migraines Brother      No Known Problems Daughter      Asthma Daughter        ROS:  10-point ROS negative except as in HPI     PHYSICAL EXAM:  /68 (BP Location: Right arm, Patient Position: Sitting, Cuff Size: Adult Regular)   Pulse 73   Temp 98.1  F (36.7  C) (Oral)   LMP 2022 (Approximate)   SpO2 100%      General: NAD, A&Ox3  CV: Regular rate  Pulm: normal respiratory effort  Abd: Gravid  Ext: No edema    Imaging:   Please see \"Imaging\" tab under \"Chart Review\" [facility]     ASSESSMENT/PLAN:  Carlos Ivan is a 32 year old  at 21w0d by 13w3d US here for [facility] consultation regarding her prior history of fetal demise at 38w6d. Her pregnancy is otherwise complicated by asthma. This visit was completed with the assistance of an [employer] .    History of fetal demise  - The most common causes of fetal death in developed countries are congenital or karyotypic anomalies, placental problems associated with growth restriction, and maternal medical diseases such as infection, diabetes mellitus, or hypertensive disorders. Many cases of fetal death, especially at term, are attributed to umbilical cord accidents. In approximately 25% of cases, however, the cause of fetal death cannot be explained.  The majority of women experiencing a fetal demise do not have any risk factors.    - In Ms. Ivan's case fetal demise may have been secondary to compression of a velamentous two vessel cord by a thrombus at the insertion site and/or secondary to undiagnosed fetal structural or genetic anomaly resulting in severe polyhydramnios.  As no fetal autopsy was performed, clear etiology can't be identified with certainty.  - " If a specific cause of a previous fetal death is identified, the recurrence risk can be better estimated.  However, if the cause of fetal death in a low-risk individual was not discovered, the risk of recurrence is estimated to be 7.8 to 10.5 per 1000 births.  The subsequent pregnancy is also at risk for abruption,  birth, and fetal growth restriction.     - Antepartum fetal monitoring in the third trimester may help to identify fetuses at risk for death, however fetal deaths do occur in pregnancies receiving regular  testing.  Delivery timing should balance the risk between prematurity and the increasing risk of fetal death due to advancing gestational age.      Recommendations:  - Evaluation for possible antiphospholipid antibody syndrome with anticardiolipin antibody (IgG and IgM) titer, lupus anticoagulant, and beta-2 microglobulin (IgG and IgM) is recommended. If positive, please discuss with MFM for ongoing plan of care and repeat assessment should be performed in 12 weeks.  - Low dose aspirin started at 12-16 weeks if there are risk factors for preeclampsia. We reviewed that this can be initiated until 28 weeks and encouraged initiation as soon as possible.  - Serial ultrasounds every 4 weeks to assess growth may be used to assess placental function.  If growth is lagging or interval growth is suboptimal ultrasounds should be done more frequently  -  testing with weekly BPP (or NST and MVP) starting at 32 weeks  - Daily fetal movement counts after 28 weeks  gestation   - Frequent visits, documentation of fetal heart tones and fetal well-being and lots of positive reinforcement are invaluable.   - Delivery at 39 weeks  o Delivery between 37&0-38&6 can be considered in women with significant anxiety related to the fetal demise after a discussion of the risks of early term delivery    Thank you for allowing us to participate in the care of your patient. Please do not hesitate to contact  us if you have further questions regarding the management of your patient.     Roxann Muhammad MD  Maternal Fetal Medicine       M Attending Attestation  I have seen and evaluated the patient, Carlos Ivan. I reviewed her chart and agree with the above documented assessment and plan. I spent a total of 45 minutes on the date of this encounter including preparing to see the patient (reviewing medical records/tests), in direct face-to-face contact with the patient during her visit with the majority (>50%) spent counseling and discussing the plan of care, documenting the visit in the electronic medical record, and communicating with other health care professionals and/or care coordination.  Please see her note for specific details; I have made the necessary edits/additions.    The patient was also seen for an ultrasound in the Maternal[facility]Fetal Medicine Center at the [facility] today.  For a detailed report of the ultrasound examination, please see the ultrasound report which can be found under the imaging tab.    Roxann Muhammad MD  Maternal Fetal Medicine

## 2022-12-16 ENCOUNTER — TELEPHONE (OUTPATIENT)
Dept: FAMILY MEDICINE | Facility: CLINIC | Age: 32
End: 2022-12-16

## 2022-12-16 ENCOUNTER — PRENATAL OFFICE VISIT (OUTPATIENT)
Dept: FAMILY MEDICINE | Facility: CLINIC | Age: 32
End: 2022-12-16
Payer: COMMERCIAL

## 2022-12-16 VITALS
RESPIRATION RATE: 16 BRPM | TEMPERATURE: 98 F | HEIGHT: 61 IN | DIASTOLIC BLOOD PRESSURE: 60 MMHG | SYSTOLIC BLOOD PRESSURE: 100 MMHG | WEIGHT: 148.12 LBS | BODY MASS INDEX: 27.97 KG/M2 | OXYGEN SATURATION: 99 % | HEART RATE: 85 BPM

## 2022-12-16 DIAGNOSIS — O09.90 SUPERVISION OF HIGH RISK PREGNANCY, ANTEPARTUM: Primary | ICD-10-CM

## 2022-12-16 DIAGNOSIS — Z59.71 INSURANCE COVERAGE PROBLEMS: ICD-10-CM

## 2022-12-16 PROCEDURE — 99212 OFFICE O/P EST SF 10 MIN: CPT | Performed by: FAMILY MEDICINE

## 2022-12-16 NOTE — TELEPHONE ENCOUNTER
Patient needs help with medical bills.  She got bills for >$100 from [facility] and >$1,000 from [facility].    Can she qualify for [facility] insurance?  If bills can't be reduced, needs help setting up payment plan      Please call her to go over this Call after 3:00.    I have copies of the bills if needed (in my office)

## 2022-12-16 NOTE — PATIENT INSTRUCTIONS
"HEALTHY PREGNANCY CARE: 18-22 WEEKS PREGNANT    Your baby is continuing to develop quickly. At this stage, babies can now suck their thumbs,  firmly with their hands, and are beginning to hear.    Sometime between 18 and 22 weeks, you will start to feel your baby move. At first, these small fetal movements feel like fluttering or \"butterflies.\" Some women say that they feel like gas bubbles. As the baby grows, these movements will become stronger and be able to be felt through your abdomen.     Nutrition: During this time, you may find that your nausea and fatigue are gone. Overall, you may feel better and have more energy than you did in your first trimester. Be sure you are getting enough calcium and iron in your diet. Your prenatal vitamins cannot supply all of the nutrients you need, so continue to eat 3-4 servings of dairy foods and 2-3 servings of meat/fish/poultry/nuts every day. Foods high in iron include: red meats, eggs, dark green vegetables, dark yellow vegetables, nuts, kidney beans and chickpeas. Some cereals are fortified with iron, so look at the food labels for 100% of the daily requirement for iron.     [place] for childbirth and parenting classes, including an infant CPR class. Breastfeeding classes are recommended too.  [employer] has classes online and in [facility], and [place]:  [email or web address]  [phone].    Plan for the gestational diabetes screening between weeks 24-28. You can eat normally before that visit; we would suggest making sure you have protein foods, but not a lot of carbohydrates or sugary foods.    Your blood type was determined at your first OB appointment. If you are Rh negative, you should discuss the need for a Rhogam shot with your midwife or physician.     If you had a  birth in the past, discuss a trial of labor with your midwife or physician. He or she may ask that you obtain your operative report from that  if " you are wanting to have a vaginal birth after  () this time.     Think about the support you have, and what help you can plan on from family and friends, after your baby is born. Many mothers and babies are ready to go home from the hospital within a few days. Your clinic staff is available to assist you and the [facility] staff is available to you after hours. Start preparing your other children for changes they'll experience with the new baby. Explore day care options.    You may find that you have new discomforts now, such as sleep problems or leg cramps.       Watch for the warning signs of premature labor:   Dull, low backache  Contractions of the uterus, menstrual-like cramps  Abdominal cramping with or without diarrhea  More pelvic pressure  Increase or change in vaginal discharge.     Remember that labor doesn't have to hurt. Never hesitate to call your midwife or physician or their staff for any one of these warning signs - or if something just doesn't feel right.

## 2022-12-16 NOTE — PROGRESS NOTES
"Assessment/Plan:   32 year old  at 22w0d    Supervision of high risk pregnancy, antepartum  Is doing quite well at this point.  Has a history of stillbirth related to severe oligohydramnios and is following with [facility] this pregnancy with findings reassuring so far.  [facility] had recommended some potential hypercoagulability work-up in her note but did not draw this blood work.  Could potentially get this at her next visit when she will be doing 1 hour glucose.  She also needs ABO/Rh collected because it was not properly labeled at her last visit and thus not run.    Insurance coverage problems  Patient has gotten some big bills for this pregnancy so far and inquires whether someone can help her get on different insurance or help set up a payment plan for her bills.  I sent a message to the care connection team in this regard.       Return in 1 month (on 2023) for prenatal care.  Subjective:   Carlos Ivan is a 32 year old  at 22w0d who is seen for routine prenatal care.   Doing well.  Endorses normal fetal movement.    Her only concern today is about the medical bills she is gotten.  She asks if there is a way she can get set up on a payment plan because she could not possibly pay the full amount at once.  Papers reviewed- there is a bill from [facility] for $121.68 for service on 10/17/2022 (ultrasound) (and a bill for $1001.36 for a few different visits.    Objective   Vitals: /60 (BP Location: Left arm, Patient Position: Sitting, Cuff Size: Adult Regular)   Pulse 85   Temp 98  F (36.7  C) (Oral)   Resp 16   Ht 1.54 m (5' 0.63\")   Wt 67.2 kg (148 lb 1.9 oz)   LMP 2022 (Approximate)   SpO2 99%   BMI 28.33 kg/m     Total weight gain:  Not found.   Exam: Per flowsheet     "

## 2022-12-21 NOTE — TELEPHONE ENCOUNTER
Patient has private health insurance through employer and it only covers 80%. Patient would like to make payment plan and I schedule her to see SW on the day she comes for OB follow up appointment.

## 2022-12-21 NOTE — TELEPHONE ENCOUNTER
Can you reach out to patient to address this. Please schedule appt with clinicians if unable to resolve this.

## 2022-12-22 ENCOUNTER — PATIENT OUTREACH (OUTPATIENT)
Dept: CARE COORDINATION | Facility: CLINIC | Age: 32
End: 2022-12-22

## 2022-12-22 NOTE — PROGRESS NOTES
Clinic Care Coordination Contact    Community Health Worker Follow Up    Care Gaps:   Health Maintenance Due   Topic Date Due     ADVANCE CARE PLANNING  Never done     YEARLY PREVENTIVE VISIT  11/30/2018     COVID-19 Vaccine (3 - Booster for Moderna series) 07/11/2021     MATERNAL SCREENING  Never done     OBGCT (OB)  12/30/2022     PCP to address other care gaps with patient at the next follow up visit.     Care Plan:   Care Plan: Health Insurance     Problem: Medicaid     Goal: Patient would like to explore if she could qualify for MA in the next 6 months.     Start Date: 12/2/2022 Expected End Date: 6/2/2023    This Visit's Progress: 20% Recent Progress: 10%    Note:     Barriers: language barrier  Strengths: Accepting of support  Patient expressed understanding of goal: Yes     Action steps to achieve this goal:   1.  I will answer my phone when I am contacted by the CHW/FRW to schedule an appointment.   2.  I will provide all necessary documentation and information to complete a [employer] application.   3.  I will call my FRW if I have questions or concerns regarding my [facility] benefits application.      Goal update: 12/22/2022                    Intervention and Education during outreach:  -Patient is currently pregnant and receiving health insurance through employer.  -Patient plans to quit her job at the end of February 2022 and will apply for [facility] health insurance.  -Patient is on CCSW's scheduled on 1/17/2023 to go over payment plan for outstanding balance.   -Patient to inform or notify employer's HR if decide to discontinue health insurance.   -Patient and spouse's income is $5184 which is over income to qualify for [facility] health insurance. According to the [facility] income guideline for family of 4 is $4625. (CHW consulted with FRW).   -Patient is aware of upcoming appointments and no additional coordination assistance needed at this time.   -Patient was informed to call with questions or concerns.      CHW Next Outreach: In one month.

## 2023-01-09 ENCOUNTER — ANCILLARY PROCEDURE (OUTPATIENT)
Dept: ULTRASOUND IMAGING | Facility: HOSPITAL | Age: 33
End: 2023-01-09
Attending: OBSTETRICS & GYNECOLOGY
Payer: COMMERCIAL

## 2023-01-09 ENCOUNTER — OFFICE VISIT (OUTPATIENT)
Dept: MATERNAL FETAL MEDICINE | Facility: HOSPITAL | Age: 33
End: 2023-01-09
Attending: OBSTETRICS & GYNECOLOGY
Payer: COMMERCIAL

## 2023-01-09 DIAGNOSIS — O43.122 VELAMENTOUS INSERTION OF UMBILICAL CORD IN SECOND TRIMESTER: ICD-10-CM

## 2023-01-09 DIAGNOSIS — O35.EXX0 PREGNANCY AFFECTED BY GENITOURINARY ABNORMALITY OF FETUS, SINGLE OR UNSPECIFIED FETUS: ICD-10-CM

## 2023-01-09 DIAGNOSIS — Z87.59 HISTORY OF STILLBIRTH: ICD-10-CM

## 2023-01-09 DIAGNOSIS — O09.292 PRIOR PREGNANCY WITH FETAL DEMISE AND CURRENT PREGNANCY IN SECOND TRIMESTER: Primary | ICD-10-CM

## 2023-01-09 PROCEDURE — 76816 OB US FOLLOW-UP PER FETUS: CPT | Mod: 26 | Performed by: OBSTETRICS & GYNECOLOGY

## 2023-01-09 PROCEDURE — 76816 OB US FOLLOW-UP PER FETUS: CPT

## 2023-01-09 PROCEDURE — 99213 OFFICE O/P EST LOW 20 MIN: CPT | Mod: 25 | Performed by: OBSTETRICS & GYNECOLOGY

## 2023-01-09 NOTE — LETTER
1/9/2023  Carlos Ivan   1990      To whom it may concern:      Please excuse Carlos Ivan from work today 1/9/2023 due to appointment with our clinic for her pregnancy.  Please call with any questions.        Sincerely,    Gregoria Marie MD

## 2023-01-09 NOTE — PROGRESS NOTES
"Please see \"Imaging\" tab under \"Chart Review\" for details of today's visit.    Gregoria Marie    "

## 2023-01-16 LAB
ABO/RH(D): NORMAL
ANTIBODY SCREEN: NEGATIVE
SPECIMEN EXPIRATION DATE: NORMAL

## 2023-01-17 ENCOUNTER — PRENATAL OFFICE VISIT (OUTPATIENT)
Dept: FAMILY MEDICINE | Facility: CLINIC | Age: 33
End: 2023-01-17
Payer: COMMERCIAL

## 2023-01-17 ENCOUNTER — ALLIED HEALTH/NURSE VISIT (OUTPATIENT)
Dept: NURSING | Facility: CLINIC | Age: 33
End: 2023-01-17
Payer: COMMERCIAL

## 2023-01-17 VITALS
TEMPERATURE: 98 F | BODY MASS INDEX: 29.45 KG/M2 | DIASTOLIC BLOOD PRESSURE: 60 MMHG | HEIGHT: 61 IN | WEIGHT: 156 LBS | OXYGEN SATURATION: 99 % | HEART RATE: 86 BPM | SYSTOLIC BLOOD PRESSURE: 104 MMHG

## 2023-01-17 DIAGNOSIS — Z71.89 COMPLEX CARE COORDINATION: Primary | ICD-10-CM

## 2023-01-17 DIAGNOSIS — O99.019 ANEMIA DURING PREGNANCY: ICD-10-CM

## 2023-01-17 DIAGNOSIS — R60.0 BILATERAL LOWER EXTREMITY EDEMA: ICD-10-CM

## 2023-01-17 DIAGNOSIS — O09.90 SUPERVISION OF HIGH RISK PREGNANCY, ANTEPARTUM: Primary | ICD-10-CM

## 2023-01-17 LAB
GLUCOSE 1H P 50 G GLC PO SERPL-MCNC: 144 MG/DL (ref 70–129)
HGB BLD-MCNC: 10.7 G/DL (ref 11.7–15.7)

## 2023-01-17 PROCEDURE — 86780 TREPONEMA PALLIDUM: CPT | Performed by: FAMILY MEDICINE

## 2023-01-17 PROCEDURE — 99213 OFFICE O/P EST LOW 20 MIN: CPT | Performed by: FAMILY MEDICINE

## 2023-01-17 PROCEDURE — 86900 BLOOD TYPING SEROLOGIC ABO: CPT | Performed by: FAMILY MEDICINE

## 2023-01-17 PROCEDURE — 36415 COLL VENOUS BLD VENIPUNCTURE: CPT | Performed by: FAMILY MEDICINE

## 2023-01-17 PROCEDURE — 82950 GLUCOSE TEST: CPT | Performed by: FAMILY MEDICINE

## 2023-01-17 PROCEDURE — 99207 PR NO CHARGE LOS: CPT

## 2023-01-17 PROCEDURE — 86901 BLOOD TYPING SEROLOGIC RH(D): CPT | Performed by: FAMILY MEDICINE

## 2023-01-17 PROCEDURE — 86850 RBC ANTIBODY SCREEN: CPT | Performed by: FAMILY MEDICINE

## 2023-01-17 NOTE — PROGRESS NOTES
Clinic Care Coordination Contact    Follow Up Progress Note      Assessment: pt seeking support with medical bills    Care Gaps:    Health Maintenance Due   Topic Date Due     ADVANCE CARE PLANNING  Never done     COVID-19 Vaccine (3 - Booster for Moderna series) 07/11/2021     OBGCT (OB)  Never done     PHQ-2 (once per calendar year)  01/01/2023       Declined due to ongoing OB appointments. Can address at next outreach.     Care Plans  Care Plan: Health Insurance     Problem: Medicaid     Goal: Patient would like to explore if she could qualify for MA in the next 6 months.     Start Date: 12/2/2022 Expected End Date: 6/2/2023    This Visit's Progress: 20% Recent Progress: 10%    Note:     Barriers: language barrier  Strengths: Accepting of support  Patient expressed understanding of goal: Yes     Action steps to achieve this goal:   1.  I will answer my phone when I am contacted by the CHW/FRW to schedule an appointment.   2.  I will provide all necessary documentation and information to complete a [employer] application.   3.  I will call my FRW if I have questions or concerns regarding my county benefits application.      Goal update: 12/22/2022                        Intervention/Education provided during outreach: [facility] education, outreach and resources              Plan:   CCSW and pt met to discuss medical bills. CCSW attempted 2x to reach [employer] jenniter. The 1st one hung up and the 2nd one took a very long time to come on the line. CCSW called [employer] billing to verify that insurance had paid. Insurance had paid their portion. CCSW then asked about payment plan. Pt would need to pay $150.00 for the next 12 months. To start the payment plan, pt must pay $150.00 to set up plan and get it started. Pt did not have anyway of making a payment today, stated she would like to come back on Thursday to set up payment plan. Pt stated she would like to wait to apply for medical assistance until she is done working, she  would also like to apply for her  as well.     CCSW follow up on Thursday. In person visit scheduled at 1:00pm.        SHIVANI Villanueva, [facility]  Social Work Care Coordinator

## 2023-01-17 NOTE — PATIENT INSTRUCTIONS
HEALTHY PREGNANCY CARE: 26-30 WEEKS PREGNANT    You are now in your last trimester of pregnancy. Your baby is growing rapidly, can open and close her eyelids, sometimes get hiccups, and you'll probably feel her moving around more often. Your baby has breathing movements and is gaining about one ounce each day. You may notice heartburn and leg cramps. Your back may ache as your body gets used to your baby's size and length.    Discuss your work situation with your midwife or physician as needed. If you stand for long periods of time, you may need to make changes and take breaks.    Be aware of your baby's activity level. You may be asked to do daily fetal movement counts. Contact your midwife or physician about any decreased movement.    You may have been tested for gestational diabetes today. If you are RH negative, you may have had an additional test and a Rhogam injection.    Consider receiving a Tdap vaccination to protect your baby from Pertussis/whooping cough.    If you are considering tubal ligation, discuss this with your midwife or physician. You will need to have an appointment with the obstetrician who will do the surgery to discuss the risks, benefits, and alternatives, and to sign the consent. This can be discussed at your next visit.    Continue to watch for any signs or symptoms of premature labor:   Regular contractions. This means having about 6 or more within 1 hour, even after you have had a glass of water and are resting.   A backache that starts and stops regularly.  An increase or change in vaginal discharge, such as heavy, mucus-like, watery, or bloody discharge.   Your water breaks or leaks.    Continue to watch for signs and symptoms of preeclampsia:   Sudden swelling of your face, hands, or feet   New vision problems such as blurring, double vision, or flashing lights  A severe headache not relieved with acetaminophen (Tylenol)  Sharp or stabbing pain in your right or middle upper  abdomen    If you have any of the above symptoms or any other concerns, call your doctor.

## 2023-01-17 NOTE — PROGRESS NOTES
"Assessment/Plan:   32 year old  at 26w4d    Supervision of high risk pregnancy, antepartum  *Elevated 1hr glucose today; Will return for 3 hr glucose.  Doing well.  Discussed breastfeeding (plans both breast and bottle),  Labor.  - Glucose Challenge Test (GCT) 1 Hour  - OB Hemoglobin  - Treponema Abs w Reflex to RPR and Titer  - ABO/Rh type and screen  - Glucose tolerance, gest std 100 gm, 3 hr    Anemia during pregnancy  Mild anemia with Hgb 10.7 today. Confirms taking PNV.  Will plan to recheck hgb in 6-8 weeks to ensure stays above 10.5    Bilateral lower extremity edema  Dependent edema on days she stands long  No other s/sx pre-eclampsia; I think this is just simple dependent edema.  Offered work note (won't help) or compression stockings (doesn't want).     Hx stillbirth; now with velamentous cord and fetal pelviectasis.  Following with MFM.    Return in 1 month (on 2023) for prenatal care.  Next visit Tdap and discuss PTL  Subjective:   Carlos Ivan is a 32 year old  at 26w4d who is seen for routine prenatal care.   Doing well.  Endorses normal fetal movement.    When standing long time, feet swell.  Happens only when standing long time at work.  Her employer doesn't allow sitting while working.  She thinks she will only work there another month or so.      Objective   Vitals: /60 (BP Location: Right arm, Patient Position: Sitting, Cuff Size: Adult Regular)   Pulse 86   Temp 98  F (36.7  C) (Oral)   Ht 1.543 m (5' 0.75\")   Wt 70.8 kg (156 lb)   LMP 2022 (Approximate)   SpO2 99%   BMI 29.72 kg/m     Total weight gain:  Not found.   Exam: Per flowsheet     "

## 2023-01-18 LAB — T PALLIDUM AB SER QL: NONREACTIVE

## 2023-01-19 ENCOUNTER — ALLIED HEALTH/NURSE VISIT (OUTPATIENT)
Dept: NURSING | Facility: CLINIC | Age: 33
End: 2023-01-19
Payer: COMMERCIAL

## 2023-01-19 DIAGNOSIS — Z71.89 COMPLEX CARE COORDINATION: Primary | ICD-10-CM

## 2023-01-19 PROCEDURE — 99207 PR NO CHARGE NURSE ONLY: CPT

## 2023-01-19 SDOH — ECONOMIC STABILITY: FOOD INSECURITY: WITHIN THE PAST 12 MONTHS, THE FOOD YOU BOUGHT JUST DIDN'T LAST AND YOU DIDN'T HAVE MONEY TO GET MORE.: NEVER TRUE

## 2023-01-19 SDOH — ECONOMIC STABILITY: FOOD INSECURITY: WITHIN THE PAST 12 MONTHS, YOU WORRIED THAT YOUR FOOD WOULD RUN OUT BEFORE YOU GOT MONEY TO BUY MORE.: NEVER TRUE

## 2023-01-19 SDOH — HEALTH STABILITY: PHYSICAL HEALTH: ON AVERAGE, HOW MANY MINUTES DO YOU ENGAGE IN EXERCISE AT THIS LEVEL?: 30 MIN

## 2023-01-19 SDOH — ECONOMIC STABILITY: TRANSPORTATION INSECURITY
IN THE PAST 12 MONTHS, HAS LACK OF TRANSPORTATION KEPT YOU FROM MEETINGS, WORK, OR FROM GETTING THINGS NEEDED FOR DAILY LIVING?: NO

## 2023-01-19 SDOH — ECONOMIC STABILITY: TRANSPORTATION INSECURITY
IN THE PAST 12 MONTHS, HAS THE LACK OF TRANSPORTATION KEPT YOU FROM MEDICAL APPOINTMENTS OR FROM GETTING MEDICATIONS?: NO

## 2023-01-19 SDOH — HEALTH STABILITY: PHYSICAL HEALTH: ON AVERAGE, HOW MANY DAYS PER WEEK DO YOU ENGAGE IN MODERATE TO STRENUOUS EXERCISE (LIKE A BRISK WALK)?: 3 DAYS

## 2023-01-19 ASSESSMENT — LIFESTYLE VARIABLES
HOW MANY STANDARD DRINKS CONTAINING ALCOHOL DO YOU HAVE ON A TYPICAL DAY: PATIENT DOES NOT DRINK
HOW OFTEN DO YOU HAVE A DRINK CONTAINING ALCOHOL: NEVER
AUDIT-C TOTAL SCORE: 0
SKIP TO QUESTIONS 9-10: 1
HOW OFTEN DO YOU HAVE SIX OR MORE DRINKS ON ONE OCCASION: NEVER

## 2023-01-19 ASSESSMENT — SOCIAL DETERMINANTS OF HEALTH (SDOH): HOW HARD IS IT FOR YOU TO PAY FOR THE VERY BASICS LIKE FOOD, HOUSING, MEDICAL CARE, AND HEATING?: NOT VERY HARD

## 2023-01-19 NOTE — LETTER
[facility]  Patient Centered Plan of Care  About Me:        Patient Name:  Carlos Ivan    YOB: 1990  Age:         32 year old   Stacey MRN:    [id number] Telephone Information:  Home Phone [phone]   Mobile [phone]       Address:  [address]  [place] [address] Email address:  No e-mail address on record      Emergency Contact(s)    Name Relationship Lgl Grd Work Phone Home Phone Mobile Phone   1. JOSE ADAMS Spouse   [phone]    2. CAMERON HOWRAD Brother-in-Law   [phone]            Primary language:  Aura     needed? Yes   [employer]:  [phone] op. 1  Other communication barriers:Language barrier    Preferred Method of Communication:     Current living arrangement: I live in a private home with family    Mobility Status/ Medical Equipment: Independent        Health Maintenance  Health Maintenance Reviewed: Due/Overdue       My Access Plan  Medical Emergency 911   Primary Clinic Line [facility] - [phone]   24 Hour Appointment Line [phone] or  [phone]) (toll-free)   24 Hour Nurse Line [phone] (toll-free)   Preferred Urgent Care [facility] [phone]     Preferred Hospital [facility]  [phone]     Preferred Pharmacy [facility] [place] [id number] Behavioral Health Crisis Line The National Suicide Prevention Lifeline at 1-155.142.4001 or Text/Call 848             My Care Team Members  Patient Care Team       Relationship Specialty Notifications Start End    Tosin Lindo MD PCP - General Family Medicine  12/17/22     Phone: [phone] Fax: [phone]         [address] [place] [address]    Tosin Lindo MD Assigned PCP   9/24/22     Phone: [phone] Fax: [phone]         [address] [place] [address]    Ld Rebolledo Community Health Worker Primary Care - [facility] Admissions 10/24/22     Phone:  [phone] Fax: [phone]         [address] [place] [address] [address] [facility] [address]    Janna Delgadillo RN Lead Care Coordinator Primary Care - [facility] Admissions 10/25/22     Roxann Muhammad MD Assigned OBGYN Provider   12/17/22     Phone: [phone] Fax: [phone]         [address] [facility] [address]            My Care Plans  Self Management and Treatment Plan  Care Plan  Care Plan: Health Insurance     Problem: Medicaid     Goal: Patient would like to explore if she could qualify for MA in the next 6 months.     Start Date: 12/2/2022 Expected End Date: 6/2/2023    This Visit's Progress: 20% Recent Progress: 10%    Note:     Barriers: language barrier  Strengths: Accepting of support  Patient expressed understanding of goal: Yes     Action steps to achieve this goal:   1.  I will answer my phone when I am contacted by the CHW/FRW to schedule an appointment.   2.  I will provide all necessary documentation and information to complete a [place] application.   3.  I will call my FRW if I have questions or concerns regarding my county benefits application.      Goal update: 12/22/2022                         Action Plans on File:   Asthma                   Advance Care Plans/Directives Type:   No data recorded    My Medical and Care Information  Problem List   Patient Active Problem List   Diagnosis     Betel deposit on teeth     History of stillbirth     Asthma     Pregnant     Anemia during pregnancy      Current Medications and Allergies:  See printed Medication Report.    Care Coordination Start Date: 10/24/2022   Frequency of Care Coordination: monthly     Form Last Updated: 01/31/2023

## 2023-01-20 NOTE — PROGRESS NOTES
Clinic Care Coordination Contact    Follow Up Progress Note      Assessment: pt in to see SW to set up payment plan for outstanding bills with [facility]    Care Gaps:    Health Maintenance Due   Topic Date Due     ADVANCE CARE PLANNING  Never done     COVID-19 Vaccine (3 - Booster for Moderna series) 07/11/2021     PHQ-2 (once per calendar year)  01/01/2023     REPEAT ANTIBODY SCREEN (OB)  01/27/2023       Postponed to next outreach     Care Plans  Care Plan: Health Insurance     Problem: Medicaid     Goal: Patient would like to explore if she could qualify for MA in the next 6 months.     Start Date: 12/2/2022 Expected End Date: 6/2/2023    This Visit's Progress: 20% Recent Progress: 10%    Note:     Barriers: language barrier  Strengths: Accepting of support  Patient expressed understanding of goal: Yes     Action steps to achieve this goal:   1.  I will answer my phone when I am contacted by the CHW/FRW to schedule an appointment.   2.  I will provide all necessary documentation and information to complete a [place] application.   3.  I will call my FRW if I have questions or concerns regarding my county benefits application.      Goal update: 12/22/2022                        Intervention/Education provided during outreach: CCC education, support and resources       Plan:   CCSW and pt met again today to set up payment plan to reduce outstanding balance. CCSW contacted billing, set up payment plan and pt stated she already quit her job so she can't pay anything at this time. Pt stated she would like to apply for medical assistance at this time. It's unclear when pt left her job, earlier this week she stated that she is going to work for another 1-2 months. Sent FRW referral and asked pt to have income information available when FRW contacts her. Pt understood.       Care Coordinator will follow up in 2weeks    SHIVANI Villanueva, LGSW  Social Work Care Coordinator        Clinic Care Coordination  Contact  Financial Resource Worker Screening    [facility] Benefits  Is patient requesting help applying for [facility] benefits?: No  Have you recently applied for any county benefits?: No    [place]  [place] Yes  Is this an insurance renewal?: No  Is this a new insurance application request?: Yes  Have you recently applied for insurance?: No  How many people in your household? : 4  Do you file taxes?: Yes  How many dependents do you claim?: 4    Any other information for the FRW?: patients spouse is working, he has no insurance. Patient just stopped working due to pregnancy difficulties, she does not know when her insurance ends.    Care Coordination team will tell patient:   Thank you for answering all the questions, based on screening questions, our Financial Resource Worker will reach out to you with additional questions and next steps.

## 2023-01-23 ENCOUNTER — PATIENT OUTREACH (OUTPATIENT)
Dept: CARE COORDINATION | Facility: CLINIC | Age: 33
End: 2023-01-23
Payer: MEDICAID

## 2023-01-23 NOTE — PROGRESS NOTES
Clinic Care Coordination Contact  Program: Nancy   County:[facility] Case #:  [facility] Worker:   Nancy #:   Subscriber #:   Renewal:  Date Applied:     FRW Outreach:   1/23/2023 FRW called and patient requested a call back on a different day this week.      Health Insurance:      Referral/Screening:  FRW Screening    Row Name 01/19/23 1004       [facility] Benefits   Is patient requesting help applying for [facility] benefits? No       Have you recently applied for any [facility] benefits? No       [place]:   [place] Yes       Is this an insurance renewal? No       Is this a new insurance application request? Yes       Have you recently applied for insurance? No       How many people in your household?  4       Do you file taxes? Yes       How many dependents do you claim? 4       OTHER   Is this a desire care application? No       Any other information for the FRW? patients spouse is working, he has no insurance. Patient just stopped working due to pregnancy difficulties, she does not know when her insurance ends.

## 2023-01-24 ENCOUNTER — LAB (OUTPATIENT)
Dept: LAB | Facility: CLINIC | Age: 33
End: 2023-01-24
Payer: COMMERCIAL

## 2023-01-24 DIAGNOSIS — O09.90 SUPERVISION OF HIGH RISK PREGNANCY, ANTEPARTUM: ICD-10-CM

## 2023-01-24 LAB
GESTATIONAL GTT 1 HR POST DOSE: 149 MG/DL (ref 60–179)
GESTATIONAL GTT 2 HR POST DOSE: 182 MG/DL (ref 60–154)
GESTATIONAL GTT 3 HR POST DOSE: 72 MG/DL (ref 60–139)
GLUCOSE P FAST SERPL-MCNC: 75 MG/DL (ref 60–94)

## 2023-01-24 PROCEDURE — 82952 GTT-ADDED SAMPLES: CPT

## 2023-01-24 PROCEDURE — 82951 GLUCOSE TOLERANCE TEST (GTT): CPT

## 2023-01-24 PROCEDURE — 36415 COLL VENOUS BLD VENIPUNCTURE: CPT

## 2023-01-25 ENCOUNTER — TELEPHONE (OUTPATIENT)
Dept: FAMILY MEDICINE | Facility: CLINIC | Age: 33
End: 2023-01-25
Payer: MEDICAID

## 2023-01-25 ENCOUNTER — PATIENT OUTREACH (OUTPATIENT)
Dept: CARE COORDINATION | Facility: CLINIC | Age: 33
End: 2023-01-25
Payer: MEDICAID

## 2023-01-25 NOTE — PROGRESS NOTES
Clinic Care Coordination Contact  Program: Nancy   County:[facility] Case #:  [facility] Worker:   Nancy #:   Subscriber #:   Renewal:  Date Applied:     FRW Outreach:   1/25/2025  FRW called patient and left a vm with call back information. FRW will make outreach next week  1/23/2023 FRW called and patient requested a call back on a different day this week.      Health Insurance:      Referral/Screening:  FRW Screening    Row Name 01/19/23 1004       [facility] Benefits   Is patient requesting help applying for [facility] benefits? No       Have you recently applied for any [facility] benefits? No       [place]:   [place] Yes       Is this an insurance renewal? No       Is this a new insurance application request? Yes       Have you recently applied for insurance? No       How many people in your household?  4       Do you file taxes? Yes       How many dependents do you claim? 4       OTHER   Is this a desire care application? No       Any other information for the FRW? patients spouse is working, he has no insurance. Patient just stopped working due to pregnancy difficulties, she does not know when her insurance ends.

## 2023-01-25 NOTE — TELEPHONE ENCOUNTER
Writer called patient with the help of a Aura  regarding provider's message below. No answer, unable to leave VM, as voicemail box is not yet set-up.    Will attempt to call pt back another time.    SABAS Alexander, RN   [facility]    ----- Message from Tosin Lindo MD sent at 1/25/2023  2:07 PM CST -----  Please call pt with 3 hour blood sugar results:  She passed the 3 hour blood sugar test.  In order to be diagnosed with diabetes of pregnancy, you have to have 2 readings out of the 4 be high and just one of hers is high.  No further workup needed at this time.

## 2023-01-26 NOTE — TELEPHONE ENCOUNTER
Writer called patient with the help of a Aura  regarding provider's message below. Provider message relayed to patient.    Patient/Caregiver/Guardian verbalizes understanding, agrees with plan and has no further questions.    Closing encounter.    APRIL AlexanderN, RN   [facility]

## 2023-01-30 ENCOUNTER — HOSPITAL ENCOUNTER (OUTPATIENT)
Facility: HOSPITAL | Age: 33
Discharge: HOME | End: 2023-01-31
Attending: FAMILY MEDICINE | Admitting: FAMILY MEDICINE
Payer: COMMERCIAL

## 2023-01-30 ENCOUNTER — PATIENT OUTREACH (OUTPATIENT)
Dept: CARE COORDINATION | Facility: CLINIC | Age: 33
End: 2023-01-30
Payer: MEDICAID

## 2023-01-30 ENCOUNTER — HOSPITAL ENCOUNTER (EMERGENCY)
Facility: HOSPITAL | Age: 33
End: 2023-01-30
Payer: COMMERCIAL

## 2023-01-30 VITALS — DIASTOLIC BLOOD PRESSURE: 69 MMHG | SYSTOLIC BLOOD PRESSURE: 106 MMHG | TEMPERATURE: 98.3 F | RESPIRATION RATE: 18 BRPM

## 2023-01-30 RX ORDER — LIDOCAINE 40 MG/G
CREAM TOPICAL
Status: DISCONTINUED | OUTPATIENT
Start: 2023-01-30 | End: 2023-01-31 | Stop reason: HOSPADM

## 2023-01-30 NOTE — PROGRESS NOTES
Clinic Care Coordination Contact  Program: Nancy   County:[facility] Case #:  [facility] Worker:   Nancy #:   Subscriber #:   Renewal:  Date Applied:     FRW Outreach:   1/30/2023 FRW called patient and left a final vm with call back information as attempt x2 with no answer or return phone calls. FRW closed the FRW program and remove patient from panel. Patient can be referred back to FRW if needed.      1/25/2025  FRW called patient and left a vm with call back information. FRW will make outreach next week  1/23/2023 FRW called and patient requested a call back on a different day this week.      Health Insurance:      Referral/Screening:  FRW Screening    Row Name 01/19/23 1004       [facility] Benefits   Is patient requesting help applying for [facility] benefits? No       Have you recently applied for any [facility] benefits? No       [place]:   [place] Yes       Is this an insurance renewal? No       Is this a new insurance application request? Yes       Have you recently applied for insurance? No       How many people in your household?  4       Do you file taxes? Yes       How many dependents do you claim? 4       OTHER   Is this a desire care application? No       Any other information for the FRW? patients spouse is working, he has no insurance. Patient just stopped working due to pregnancy difficulties, she does not know when her insurance ends.

## 2023-01-31 ENCOUNTER — HOSPITAL ENCOUNTER (OUTPATIENT)
Facility: HOSPITAL | Age: 33
End: 2023-01-31
Admitting: FAMILY MEDICINE
Payer: COMMERCIAL

## 2023-01-31 LAB
ALBUMIN SERPL BCG-MCNC: 3.6 G/DL (ref 3.5–5.2)
ALP SERPL-CCNC: 52 U/L (ref 35–104)
ALT SERPL W P-5'-P-CCNC: 17 U/L (ref 10–35)
AST SERPL W P-5'-P-CCNC: 14 U/L (ref 10–35)
BASOPHILS # BLD AUTO: 0 10E3/UL (ref 0–0.2)
BASOPHILS NFR BLD AUTO: 0 %
BILIRUB DIRECT SERPL-MCNC: <0.2 MG/DL (ref 0–0.3)
BILIRUB SERPL-MCNC: 0.2 MG/DL
EOSINOPHIL # BLD AUTO: 0.1 10E3/UL (ref 0–0.7)
EOSINOPHIL NFR BLD AUTO: 1 %
ERYTHROCYTE [DISTWIDTH] IN BLOOD BY AUTOMATED COUNT: 13.4 % (ref 10–15)
HCT VFR BLD AUTO: 34.5 % (ref 35–47)
HGB BLD-MCNC: 11 G/DL (ref 11.7–15.7)
HOLD SPECIMEN: NORMAL
HOLD SPECIMEN: NORMAL
IMM GRANULOCYTES # BLD: 0.1 10E3/UL
IMM GRANULOCYTES NFR BLD: 1 %
LYMPHOCYTES # BLD AUTO: 1.4 10E3/UL (ref 0.8–5.3)
LYMPHOCYTES NFR BLD AUTO: 9 %
MCH RBC QN AUTO: 26.9 PG (ref 26.5–33)
MCHC RBC AUTO-ENTMCNC: 31.9 G/DL (ref 31.5–36.5)
MCV RBC AUTO: 84 FL (ref 78–100)
MONOCYTES # BLD AUTO: 0.7 10E3/UL (ref 0–1.3)
MONOCYTES NFR BLD AUTO: 5 %
NEUTROPHILS # BLD AUTO: 13.3 10E3/UL (ref 1.6–8.3)
NEUTROPHILS NFR BLD AUTO: 84 %
NRBC # BLD AUTO: 0 10E3/UL
NRBC BLD AUTO-RTO: 0 /100
PLATELET # BLD AUTO: 200 10E3/UL (ref 150–450)
PROT SERPL-MCNC: 6.4 G/DL (ref 6.4–8.3)
RBC # BLD AUTO: 4.09 10E6/UL (ref 3.8–5.2)
WBC # BLD AUTO: 15.5 10E3/UL (ref 4–11)

## 2023-01-31 PROCEDURE — 36415 COLL VENOUS BLD VENIPUNCTURE: CPT | Performed by: FAMILY MEDICINE

## 2023-01-31 PROCEDURE — 80076 HEPATIC FUNCTION PANEL: CPT | Performed by: FAMILY MEDICINE

## 2023-01-31 PROCEDURE — 85025 COMPLETE CBC W/AUTO DIFF WBC: CPT | Performed by: FAMILY MEDICINE

## 2023-01-31 ASSESSMENT — ACTIVITIES OF DAILY LIVING (ADL): ADLS_ACUITY_SCORE: 31

## 2023-01-31 NOTE — PROGRESS NOTES
"2340: Patient arrived [place] [facility] reporting right sided abdominal pain. With help from Aura  patient denies headache or change in vision. Patient denies vaginal bleeding or leaking of fluid. Upon assessment abdomen feels soft and patient denies worsening pain with palpation. Patient states \"it feels like I have to poop but I cannot\". She reported her last BM being this morning. Vitals are stable. Writer received consent to place EFM on patient. EFM is category 1 and shows a reactive tracing.     0006: Dr. Lindo called and updated on patient's arrival and reports. Orders for labs placed. Will call back with results to determine plan of care.     0015: Patient stated \"my abdominal pain has gotten better, it feels like gas that is stuck\".   "

## 2023-02-06 ENCOUNTER — PATIENT OUTREACH (OUTPATIENT)
Dept: CARE COORDINATION | Facility: CLINIC | Age: 33
End: 2023-02-06
Payer: MEDICAID

## 2023-02-06 NOTE — PROGRESS NOTES
Clinic Care Coordination Contact    Community Health Worker Follow Up    Care Gaps:   Health Maintenance Due   Topic Date Due     ADVANCE CARE PLANNING  Never done     COVID-19 Vaccine (3 - Booster for Moderna series) 07/11/2021     PHQ-2 (once per calendar year)  01/01/2023     REPEAT ANTIBODY SCREEN (OB)  01/27/2023     PCP to discuss other care gaps with patient post delivery or as appropriate    Care Plan:   Care Plan: Health Insurance     Problem: Medicaid     Goal: Patient would like to explore if she could qualify for MA in the next 6 months.     Start Date: 12/2/2022 Expected End Date: 6/2/2023    This Visit's Progress: 30% Recent Progress: 20%    Note:     Barriers: language barrier  Strengths: Accepting of support  Patient expressed understanding of goal: Yes     Action steps to achieve this goal:   1.  I will answer my phone when I am contacted by the CHW/FRW to schedule an appointment.   2.  I will provide all necessary documentation and information to complete a [place] application.   3.  I will call my FRW if I have questions or concerns regarding my [facility] benefits application.                        Intervention and Education during outreach:  -Patient is still working and receiving health insurance through employer.  -Patient stated she will quit her job at the end of this month, February 2023 and will start applying for [facility] benefits and health insurance.   -CHW informed patient to request letter/document from current employer that she quit her job so that she can provide to the [facility] as evident when she applies for [employer] [facility] benefits/health insurance.  -Patient stated she will be due some times in April 2023.  -Patient was informed to call with questions or concerns.     CHW Next Outreach: In one month.

## 2023-02-08 ENCOUNTER — ANCILLARY PROCEDURE (OUTPATIENT)
Dept: ULTRASOUND IMAGING | Facility: HOSPITAL | Age: 33
End: 2023-02-08
Attending: OBSTETRICS & GYNECOLOGY
Payer: COMMERCIAL

## 2023-02-08 ENCOUNTER — OFFICE VISIT (OUTPATIENT)
Dept: MATERNAL FETAL MEDICINE | Facility: HOSPITAL | Age: 33
End: 2023-02-08
Attending: OBSTETRICS & GYNECOLOGY
Payer: COMMERCIAL

## 2023-02-08 DIAGNOSIS — O09.293 PRIOR PREGNANCY WITH FETAL DEMISE AND CURRENT PREGNANCY IN THIRD TRIMESTER: Primary | ICD-10-CM

## 2023-02-08 DIAGNOSIS — O35.EXX0 PREGNANCY AFFECTED BY GENITOURINARY ABNORMALITY OF FETUS, SINGLE OR UNSPECIFIED FETUS: ICD-10-CM

## 2023-02-08 DIAGNOSIS — O43.199 MARGINAL INSERTION OF UMBILICAL CORD AFFECTING MANAGEMENT OF MOTHER: ICD-10-CM

## 2023-02-08 PROCEDURE — 76816 OB US FOLLOW-UP PER FETUS: CPT | Mod: 26 | Performed by: OBSTETRICS & GYNECOLOGY

## 2023-02-08 PROCEDURE — 76816 OB US FOLLOW-UP PER FETUS: CPT

## 2023-02-08 PROCEDURE — 99207 PR NO CHARGE LOS: CPT | Performed by: OBSTETRICS & GYNECOLOGY

## 2023-02-08 NOTE — LETTER
February 8, 2023      To Whom It May Concern,    Carlos Ivan (1990) was seen today at [facility].     If you have further questions, please do not hesitate to contact me.      Regards,      Monisha Wetzel MD    [facility] [address] [address] [phone]  Dept: [phone]

## 2023-02-08 NOTE — PROGRESS NOTES
Please see the imaging tab for details of the ultrasound performed today.    Monisha Wetzel MD  Specialist in Maternal-Fetal Medicine

## 2023-02-10 ENCOUNTER — PRENATAL OFFICE VISIT (OUTPATIENT)
Dept: FAMILY MEDICINE | Facility: CLINIC | Age: 33
End: 2023-02-10
Payer: COMMERCIAL

## 2023-02-10 ENCOUNTER — TELEPHONE (OUTPATIENT)
Dept: FAMILY MEDICINE | Facility: CLINIC | Age: 33
End: 2023-02-10

## 2023-02-10 VITALS
RESPIRATION RATE: 24 BRPM | HEART RATE: 84 BPM | TEMPERATURE: 98.4 F | WEIGHT: 162.25 LBS | HEIGHT: 61 IN | OXYGEN SATURATION: 99 % | DIASTOLIC BLOOD PRESSURE: 56 MMHG | SYSTOLIC BLOOD PRESSURE: 90 MMHG | BODY MASS INDEX: 30.63 KG/M2

## 2023-02-10 DIAGNOSIS — O09.90 SUPERVISION OF HIGH RISK PREGNANCY, ANTEPARTUM: Primary | ICD-10-CM

## 2023-02-10 PROCEDURE — 99212 OFFICE O/P EST SF 10 MIN: CPT | Mod: 25 | Performed by: FAMILY MEDICINE

## 2023-02-10 PROCEDURE — 90471 IMMUNIZATION ADMIN: CPT | Performed by: FAMILY MEDICINE

## 2023-02-10 PROCEDURE — 90715 TDAP VACCINE 7 YRS/> IM: CPT | Performed by: FAMILY MEDICINE

## 2023-02-10 NOTE — TELEPHONE ENCOUNTER
Pt referred to CCC in October for prengnacy issues including  referral, but hasn't been connected to a  yet.  Can you follow-up on this?    (FYI, patients can be referred for  as soon as they are pregnant, doesn't not need to wait to end of pregnancy)

## 2023-02-10 NOTE — PROGRESS NOTES
"Assessment/Plan:   32 year old  at 30w0d    Supervision of high risk pregnancy, antepartum  Doing well  Tdap today  Seeing MFM regularly for monitoring due to history stillbirth.  Amniotic band and velamentous cord insertion seen on previous ultrasounds has resolved.  Still following fetal renal pelviectasis.    Hasn't gotten call about .  Will ask CCC team to help connect.     Return in 2 weeks (on 2023) for prenatal care. (see another doctor that day because can only come in the mornings)  Subjective:   Carlos Ivan is a 32 year old  at 30w0d who is seen for routine prenatal care.   Doing well.  Endorses normal fetal movement.    Objective   Vitals: BP 90/56 (BP Location: Right arm, Patient Position: Sitting, Cuff Size: Adult Regular)   Pulse 84   Temp 98.4  F (36.9  C) (Oral)   Resp 24   Ht 1.543 m (5' 0.75\")   Wt 73.6 kg (162 lb 4 oz)   LMP 2022 (Approximate)   SpO2 99%   BMI 30.91 kg/m     Total weight gain:  Not found.   Exam: Per flowsheet     "

## 2023-02-10 NOTE — PATIENT INSTRUCTIONS
Patient Education   HEALTHY PREGNANCY CARE: 30-34 WEEKS PREGNANT    You have made it to the final months of your pregnancy. By now, your baby is starting to fill out with some fat under his skin, fuzzy hair on his shoulders, and is gaining 4 to 6 ounces per week.    Discuss any travel plans with your midwife or physician.    Review possible changes in sexuality during later pregnancy and discuss these with your midwife or physician, as well as your partner. Alternative love-making positions may be more comfortable.    Discuss labor and delivery issues with your midwife or physician. If you had a  birth in the past, discuss a trial of labor with your midwife or physician. He or she may ask that you sign a consent form, if you wish to have a vaginal birth after  (). Ask your midwife or physician to explain your options for managing pain during your labor and delivery. Sometimes, during the birth process, an episiotomy may need to be cut in the vagina to make the opening bigger or let the baby come out quicker. You may want to discuss the episiotomy and how often it is needed with your midwife or physician.    Plan for your baby's care by selecting a child health care provider (Family physician, Pediatrician, or Pediatric Nurse Practitioner). Practice installing an infant car seat correctly in the car. Ask for car seat information as needed and make sure it is safe and will work in the car your baby will ride in. You will need a car seat to bring your baby home from the hospital. Check the procedure for adding your baby to your health care plan. Review your decision about circumcision and ask for any information you need. As you buy and receive items for your baby, don't put a baby walker on your list. Walkers can be dangerous and can cause serious injury to your child. A safer option is a saucer-type play station, since it doesn't allow baby to travel across the floor.    Discuss your choices and  plans for birth control with your midwife or physician. Women who are breastfeeding can still become pregnant. Use a birth control method if you want to lower your pregnancy risk. Talk to your midwife or physician if you are considering permanent birth control, such as tubal ligation or Essure. You may need to complete a consent form 30 days prior to delivery in order to have this done after you deliver.    Continue to watch for signs and symptoms of preeclampsia:   Sudden swelling of your face, hands, or feet   New vision problems such as blurring, double vision, or flashing lights  A severe headache not relieved with acetaminophen (Tylenol)  Sharp or stabbing pain in your right or middle upper abdomen    Watch for signs and symptoms of premature labor:   Regular contractions. This means having about 6 or more within 1 hour, even after you have had a glass of water and are resting.   A backache that starts and stops regularly.  An increase or change in vaginal discharge, such as heavy, mucus-like, watery, or bloody discharge.   Your water breaks or leaks.    If you have any of the above symptoms or any other concerns, call your provider or their clinic staff.

## 2023-02-13 NOTE — TELEPHONE ENCOUNTER
Hi Dr. Lindo,    This patient is still enrolled with [facility] and we can refer her for  and I included our CCC SW in here. Per my previous outreach and conversation with patient that she's still working and plans to quit her job at the end of this month. We have not referred her to [facility] yet because she wants to apply for County benefits and health insurance and then we can send referral to [employer].     Baudilio, please review and advise.

## 2023-02-13 NOTE — TELEPHONE ENCOUNTER
It looks her current insurance is accepted for pete at [employer].  Could we submit for that now? It's nice to have that support throughout the pregnancy when possible. The  will then take care of the carseat part later when her insurance changes, I think.

## 2023-02-15 NOTE — TELEPHONE ENCOUNTER
CHW tried to call patient to find out more information of the exact date she plans to quit her job and unable to reach and not able to leave a voice message to request a call back. CHW also called [employer] [facility] and left a voice message to call back to obtain more information of referral time line for  and what private health insurance it's accepting.

## 2023-02-16 NOTE — TELEPHONE ENCOUNTER
Just received respond email from [employer] that it does not accept [facility] - Commercial health insurance for .

## 2023-02-16 NOTE — TELEPHONE ENCOUNTER
I have been unable to reach patient all day yesterday, 2/15/2023 and I tried again today, unable to reach and not able to leave a voice message due to voice mail not set up.     I tried to place pete referral at [employer] website and there is no option to choose for [facility] - Commercial health insurance. I sent email to [email or web address] to find out more.

## 2023-02-20 ENCOUNTER — PRENATAL OFFICE VISIT (OUTPATIENT)
Dept: FAMILY MEDICINE | Facility: CLINIC | Age: 33
End: 2023-02-20
Payer: COMMERCIAL

## 2023-02-20 VITALS
BODY MASS INDEX: 29.92 KG/M2 | SYSTOLIC BLOOD PRESSURE: 100 MMHG | HEIGHT: 61 IN | RESPIRATION RATE: 16 BRPM | TEMPERATURE: 98.2 F | DIASTOLIC BLOOD PRESSURE: 67 MMHG | WEIGHT: 158.5 LBS | HEART RATE: 80 BPM | OXYGEN SATURATION: 98 %

## 2023-02-20 DIAGNOSIS — Z34.90 PREGNANCY, UNSPECIFIED GESTATIONAL AGE: Primary | ICD-10-CM

## 2023-02-20 PROCEDURE — 99212 OFFICE O/P EST SF 10 MIN: CPT | Performed by: FAMILY MEDICINE

## 2023-02-20 NOTE — PROGRESS NOTES
"SUBJECTIVE:   at 31w3d. Estimated Date of Delivery: 2023.  She is doing well. She denies vaginal bleeding, leakage of fluid, or urinary symptoms. Fetal movement is present. She is not having contractions.  Concerns: has insurance through employer but plans to stop working next week (resigning), so she is interested in receiving assistance with getting new medical insurance.  ROS  Negative except as noted above in HPI  OBJECTIVE:  Blood pressure 100/67, pulse 80, temperature 98.2  F (36.8  C), temperature source Oral, resp. rate 16, height 1.543 m (5' 0.75\"), weight 71.9 kg (158 lb 8 oz), last menstrual period 2022, SpO2 98 %, not currently breastfeeding.  Gen: Comfortable, no acute distress.  Abd: Gravid, non-tender  See OB Vitals flowsheet.  ASSESSMENT/PLAN:  Carlos was seen today for prenatal care.    Diagnoses and all orders for this visit:    Pregnancy, unspecified gestational age      -IUP at 31w3d:     Prenatal labs reviewed     History of marginal cord insertion (previously velamentous)- follows with MFM- reviewed recent note and has upcoming visit 3/1/23 for growth US- will start  surveillance.    Will send message to CCC team regarding patient's insurance issue    RTC in 2 weeks or sooner with problems.    Visit completed along with assistance of Aura .    Geeta Castro MD    "

## 2023-02-22 NOTE — TELEPHONE ENCOUNTER
Patient's last day of work at current employer will be on Friday, 2/24/2023, patient will call CHW on Monday, 2/27/2023 and will go through process of referring patient to FRW to assist with County benefits and insurance.

## 2023-02-27 ENCOUNTER — PATIENT OUTREACH (OUTPATIENT)
Dept: CARE COORDINATION | Facility: CLINIC | Age: 33
End: 2023-02-27
Payer: MEDICAID

## 2023-02-27 NOTE — PROGRESS NOTES
Clinic Care Coordination Contact    Community Health Worker Follow Up    Care Gaps:   Health Maintenance Due   Topic Date Due     ADVANCE CARE PLANNING  Never done     COVID-19 Vaccine (3 - Booster for Moderna series) 07/11/2021     REPEAT ANTIBODY SCREEN (OB)  01/27/2023     ASTHMA CONTROL TEST  03/13/2023     Postponed to post deliver     Care Plan:   Care Plan: Health Insurance     Problem: Medicaid     Goal: Patient would like to explore if she could qualify for [place] and [facility] SNAP in the next 6 months.     Start Date: 12/2/2022 Expected End Date: 6/2/2023    This Visit's Progress: 30% Recent Progress: 30%    Note:     Barriers: language barrier  Strengths: Accepting of support  Patient expressed understanding of goal: Yes     Action steps to achieve this goal:   1.  I will answer my phone when I am contacted by the FRW to schedule an appointment.   2.  I will provide all necessary documentation and information to complete a [place] application.   3.  I will call my FRW if I have questions or concerns regarding my county benefits application.                      Intervention and Education during outreach:  -Patient quit her job on 2/23/2023, no longer receiving health insurance through employer and would like assistance to apply for [facility] benefits and health insurance.   -CHW conducted FRW screening and placed referral to FRW to assist with requests.   -Patient was informed to call with questions or concerns.     CHW Next Outreach: In one month.     Financial Resource Worker Screening  [facility] Benefits  Is patient requesting help applying for [facility] benefits?: Yes  Have you recently applied for any [facility] benefits?: No  How many people in your household?: 4  Do you buy/eat food together?: Yes  What is the monthly gross income for the household (wages, social security, workers comp, and pension)? : 3600    [place]:  [place] No  Is this an insurance renewal?: No  Is this a new  insurance application request?: Yes  Have you recently applied for insurance?: No  How many people in your household? : 4  Do you file taxes?: Yes  How many dependents do you claim?: 2  What is the monthly gross income for the household (wages, social security, workers comp, and pension)? : 3600     Care Coordination team will tell patient:   Thank you for answering all the questions, based on screening questions, our Financial Resource Worker will reach out to you with additional questions and next steps.

## 2023-03-01 ENCOUNTER — PATIENT OUTREACH (OUTPATIENT)
Dept: CARE COORDINATION | Facility: CLINIC | Age: 33
End: 2023-03-01

## 2023-03-01 ENCOUNTER — OFFICE VISIT (OUTPATIENT)
Dept: MATERNAL FETAL MEDICINE | Facility: HOSPITAL | Age: 33
End: 2023-03-01
Attending: OBSTETRICS & GYNECOLOGY
Payer: MEDICAID

## 2023-03-01 ENCOUNTER — ANCILLARY PROCEDURE (OUTPATIENT)
Dept: ULTRASOUND IMAGING | Facility: HOSPITAL | Age: 33
End: 2023-03-01
Attending: OBSTETRICS & GYNECOLOGY
Payer: MEDICAID

## 2023-03-01 DIAGNOSIS — O09.293 PRIOR PREGNANCY WITH FETAL DEMISE AND CURRENT PREGNANCY IN THIRD TRIMESTER: Primary | ICD-10-CM

## 2023-03-01 DIAGNOSIS — O09.293 PRIOR PREGNANCY WITH FETAL DEMISE AND CURRENT PREGNANCY IN THIRD TRIMESTER: ICD-10-CM

## 2023-03-01 DIAGNOSIS — O43.199 MARGINAL INSERTION OF UMBILICAL CORD AFFECTING MANAGEMENT OF MOTHER: ICD-10-CM

## 2023-03-01 PROCEDURE — 76819 FETAL BIOPHYS PROFIL W/O NST: CPT

## 2023-03-01 PROCEDURE — 76816 OB US FOLLOW-UP PER FETUS: CPT | Mod: 26 | Performed by: OBSTETRICS & GYNECOLOGY

## 2023-03-01 PROCEDURE — 99207 PR NO CHARGE LOS: CPT | Performed by: OBSTETRICS & GYNECOLOGY

## 2023-03-01 PROCEDURE — 76819 FETAL BIOPHYS PROFIL W/O NST: CPT | Mod: 26 | Performed by: OBSTETRICS & GYNECOLOGY

## 2023-03-01 NOTE — NURSING NOTE
Patient reports + fetal movement, denies pain/contractions, leaking of fluid, or bleeding. SBAR given to AKIN WOODWARD, see their note in Epic.

## 2023-03-01 NOTE — PROGRESS NOTES
Please see full imaging report from ViewPoint program under imaging tab.    Thank-you for referring your patient to assess fetal growth and to start weekly BPPs. I discussed the findings on today's ultrasound with the patient, with the assistance of a Aura phone . We reviewed the prior consultation and recommendations. She is scheduled for weekly BPP and serial growth. She really does not want to be induced, but would prefer to await spontaneous labor until findings are identified on US that would prompt earlier delivery.     Continue serial ultrasounds to assess fetal growth and BPP weekly with MFM. Continue OB care with Dr. Lindo.    If you have questions regarding today's evaluation or if we can be of further service, please contact the [facility].    Jamarcus Nina MD  Maternal Fetal Medicine

## 2023-03-02 ENCOUNTER — PATIENT OUTREACH (OUTPATIENT)
Dept: CARE COORDINATION | Facility: CLINIC | Age: 33
End: 2023-03-02
Payer: MEDICAID

## 2023-03-02 NOTE — PROGRESS NOTES
Clinic Care Coordination Contact  Program: Health Insurance/County Benefits   County:  [facility] Case #:  County Worker:   Jhonnyure #:   Subscriber #:   Renewal:  Date Applied:     FRW Outreach:   3/2/23: I called pt and scheduled appointment to apply for health insurance and county benefits on 3/17.    Health Insurance:      Referral/Screening:  Financial Resource Worker Screening  County Benefits  Is patient requesting help applying for [facility] benefits?: Yes  Have you recently applied for any county benefits?: No  How many people in your household?: 4  Do you buy/eat food together?: Yes  What is the monthly gross income for the household (wages, social security, workers comp, and pension)? : 3600     [place]:  [place] No  Is this an insurance renewal?: No  Is this a new insurance application request?: Yes  Have you recently applied for insurance?: No  How many people in your household? : 4  Do you file taxes?: Yes  How many dependents do you claim?: 2  What is the monthly gross income for the household (wages, social security, workers comp, and pension)? : 3600      Care Coordination team will tell patient:   Thank you for answering all the questions, based on screening questions, our Financial Resource Worker will reach out to you with additional questions and next steps.

## 2023-03-07 ENCOUNTER — PRENATAL OFFICE VISIT (OUTPATIENT)
Dept: FAMILY MEDICINE | Facility: CLINIC | Age: 33
End: 2023-03-07
Payer: COMMERCIAL

## 2023-03-07 VITALS
RESPIRATION RATE: 20 BRPM | BODY MASS INDEX: 30.35 KG/M2 | HEIGHT: 61 IN | OXYGEN SATURATION: 99 % | WEIGHT: 160.75 LBS | HEART RATE: 88 BPM | TEMPERATURE: 97.9 F | DIASTOLIC BLOOD PRESSURE: 56 MMHG | SYSTOLIC BLOOD PRESSURE: 90 MMHG

## 2023-03-07 DIAGNOSIS — O09.90 SUPERVISION OF HIGH RISK PREGNANCY, ANTEPARTUM: Primary | ICD-10-CM

## 2023-03-07 PROCEDURE — 99207 PR PRENATAL VISIT: CPT | Performed by: FAMILY MEDICINE

## 2023-03-07 ASSESSMENT — ASTHMA QUESTIONNAIRES
QUESTION_5 LAST FOUR WEEKS HOW WOULD YOU RATE YOUR ASTHMA CONTROL: COMPLETELY CONTROLLED
ACT_TOTALSCORE: 25
QUESTION_2 LAST FOUR WEEKS HOW OFTEN HAVE YOU HAD SHORTNESS OF BREATH: NOT AT ALL
QUESTION_1 LAST FOUR WEEKS HOW MUCH OF THE TIME DID YOUR ASTHMA KEEP YOU FROM GETTING AS MUCH DONE AT WORK, SCHOOL OR AT HOME: NONE OF THE TIME
QUESTION_3 LAST FOUR WEEKS HOW OFTEN DID YOUR ASTHMA SYMPTOMS (WHEEZING, COUGHING, SHORTNESS OF BREATH, CHEST TIGHTNESS OR PAIN) WAKE YOU UP AT NIGHT OR EARLIER THAN USUAL IN THE MORNING: NOT AT ALL
ACT_TOTALSCORE: 25
QUESTION_4 LAST FOUR WEEKS HOW OFTEN HAVE YOU USED YOUR RESCUE INHALER OR NEBULIZER MEDICATION (SUCH AS ALBUTEROL): NOT AT ALL

## 2023-03-07 NOTE — PATIENT INSTRUCTIONS
"Making Early Breastfeeding or Chestfeeding Work: What's Important?    Breastfeeding/chestfeeding is important!   It helps keep babies healthy.  Parents who breastfeed/chestfeed have lower risks of breast and ovarian cancer.  It's convenient: the milk is always ready and warm, and there is nothing to mix or prepare for feeding.  Formula is harder for your baby to digest.  It helps you bond with your baby and protects against postpartum depression.  Lots of early skin-to-skin contact with your baby  Place your naked baby with baby's belly against your bare chest. Cover baby's back with a blanket  Start skin-to-skin right after birth, as soon as you are ready  Skin-to-skin:  Helps keep baby warm  Improves baby's oxygen and blood sugar levels  Helps your uterus contract and bleed less  Helps baby feel calm and comforted  Helps you feel close to baby  Helps get breastfeeding started. Being close makes latching on easier, and baby may move over to the nipple and latch without help  Baby breastfeeds better and longer when skin-to-skin  Placing baby well for good attachment to the breast or chest  Hold your baby close with baby's tummy touching your tummy.  Wait for baby to open mouth wide, then bring baby onto the breast/chest.  Baby should take a big mouthful of breast/chest, not just the nipple. This helps baby get more milk, and the suckling should feel comfortable.  When baby is latched well to the breast/chest, nipples aren't cracked or painful.  Keeping baby near (called \"rooming-in\" at the hospital)  Baby sleeps better and cries less when birth parent is near. Your room is quiet.  We will place your baby safely on their back in their bassinette. This lets you practice safe sleep for your baby while keeping them at your bedside.  Baby feeds more often, which means your milk supply increases faster, and your baby loses less weight.  Parents have an easier time getting to know and bonding with baby.  Parents feel much " "more confident about baby care and breastfeeding/chestfeeding.  Maternity staff can help at any time.  Feeding on cue  Feeding on cue simply means feeding whenever your baby shows signs of hunger  Crying is a late hunger sign. Feed baby whenever baby wants for as long as baby wants.  Feeding signs: mouth movements, sticking the tongue out, rooting (baby turns toward chest and may open mouth), hand-to-mouth movements  Advantages of feeding on cue:  Frequent breastfeeding/chestfeeding in the first weeks after birth gives you a good milk supply for months to come.  Babies settle into a relaxing feed more quickly. Babies enjoy feedings more when they don't have to cry to be fed.  Feeding is comfort as well as nutrition. Newborns love constant closeness and feeding and can't be held \"too much\" or \"spoiled.\"  Newborns need small frequent feedings in the first days of life. Just one to three teaspoons fill a new baby's stomach.  Responding to feeding cues helps babies gain weight.  Feeding only human milk in the first six months  Colostrum is the first milk that baby gets at birth. The amount of colostrum matches the baby's stomach, so it will not be overfilled.  The small volumes ready at birth are also easier for baby to handle.  All babies lose weight in the first few days. This is simply \"water weight.\"  Introducing food or fluids other than human milk too early can cause problems for breastfeeding/chestfeeding and for your baby's health.  Feeding only human milk maximizes the protection against disease and infections.  Your body knows how much milk to make by how often your baby feeds. If you give your baby formula, your body may not know how much milk to make.    For informational purposes only. Not to replace the advice of your health care provider. Adapted with permission from \"Getting Started with Infant Care and Breastfeeding,\" by MALORIE Cole, MARYANN, Claudia Bishop, RN, IBCLC, Soumya Jordan MD, MARYANN, and " Clarisse Solares MD, IBCLC. [place] [employer], 2017. Clinically reviewed by [facility]. [employer] [id number] - 05/19.

## 2023-03-07 NOTE — PROGRESS NOTES
Clinic Care Coordination Contact    Care Coordination Clinician Chart Review    Situation: Patient chart reviewed by care coordinator.       Background: Care Coordination initial assessment and enrollment to Care Coordination was 2/28/2023   Patient centered goals were developed with participation from patient.  RN CC handed patient off to CHW for continued outreach every 30 days.        Assessment: Per chart review, patient outreach completed by CC CHW on 2/27/2023.  Patient is actively working to accomplish goal.  Patient's goal remains appropriate and relevant at this time.   Patient is not due for updated Plan of Care.  Annual assessment will be due 2/28/2024. Per chart review, patient continue to work with FRW to assist her apply for medicaid. Patient quit her job today and plan to apply for medicaid. Patient is scheduled to apply for MA with assist from FRW on 3/17/2023.      Care Plan: Health Insurance     Problem: Medicaid     Goal: Patient would like to explore if she could qualify for [place] and [place]NAP in the next 6 months.     Start Date: 12/2/2022 Expected End Date: 6/2/2023    This Visit's Progress: 30% Recent Progress: 30%    Note:     Barriers: language barrier  Strengths: Accepting of support  Patient expressed understanding of goal: Yes     Action steps to achieve this goal:   1.  I will answer my phone when I am contacted by the FRW to schedule an appointment.   2.  I will provide all necessary documentation and information to complete a [place] application.   3.  I will call my FRW if I have questions or concerns regarding my county benefits application.                              Plan/Recommendations: The patient will continue working with Care Coordination to achieve goal as above.  CHW will involve RN  CC as needed or if patient is ready to move to maintenance.  RN CC will continue to monitor progress to goals and CHW outreaches every 6 weeks.   Plan of Care updated and mailed to patient:  No

## 2023-03-07 NOTE — PROGRESS NOTES
"Assessment/Plan:   32 year old  at 33w4d    Supervision of high risk pregnancy, antepartum  Doing well.  Will continue monitoring through MFM for hx stillbirth  Discussed contraception: plans Nexplanon (used before)  Next visit:  GBS        Return in 3 weeks (on 3/28/2023).  Subjective:   Carlos Ivan is a 32 year old  at 33w4d who is seen for routine prenatal care.   Doing well.  Endorses normal fetal movement.    Quite job; has appt coming up to work out insurance issues  Objective   Vitals: BP 90/56 (BP Location: Left arm, Patient Position: Sitting, Cuff Size: Adult Regular)   Pulse 88   Temp 97.9  F (36.6  C) (Oral)   Resp 20   Ht 1.543 m (5' 0.75\")   Wt 72.9 kg (160 lb 12 oz)   LMP 2022 (Approximate)   SpO2 99%   BMI 30.62 kg/m     Total weight gain:  Not found.   Exam: Per flowsheet     "

## 2023-03-08 ENCOUNTER — OFFICE VISIT (OUTPATIENT)
Dept: MATERNAL FETAL MEDICINE | Facility: HOSPITAL | Age: 33
End: 2023-03-08
Attending: OBSTETRICS & GYNECOLOGY
Payer: MEDICAID

## 2023-03-08 ENCOUNTER — ANCILLARY PROCEDURE (OUTPATIENT)
Dept: ULTRASOUND IMAGING | Facility: HOSPITAL | Age: 33
End: 2023-03-08
Attending: OBSTETRICS & GYNECOLOGY
Payer: MEDICAID

## 2023-03-08 DIAGNOSIS — O43.199 MARGINAL INSERTION OF UMBILICAL CORD AFFECTING MANAGEMENT OF MOTHER: ICD-10-CM

## 2023-03-08 DIAGNOSIS — Z87.59 HISTORY OF STILLBIRTH: Primary | ICD-10-CM

## 2023-03-08 DIAGNOSIS — O09.293 PRIOR PREGNANCY WITH FETAL DEMISE AND CURRENT PREGNANCY IN THIRD TRIMESTER: ICD-10-CM

## 2023-03-08 PROCEDURE — 76819 FETAL BIOPHYS PROFIL W/O NST: CPT

## 2023-03-08 PROCEDURE — 76819 FETAL BIOPHYS PROFIL W/O NST: CPT | Mod: 26 | Performed by: OBSTETRICS & GYNECOLOGY

## 2023-03-08 PROCEDURE — 99207 PR NO CHARGE LOS: CPT | Performed by: OBSTETRICS & GYNECOLOGY

## 2023-03-08 NOTE — PROGRESS NOTES
Please see full imaging report from ViewPoint program under imaging tab.    Jamarcus Nina MD  Maternal Fetal Medicine

## 2023-03-08 NOTE — NURSING NOTE
Carlos Ivan is a  at 33w5d who presents to [facility] for BPP. Pt reports positive fetal movement. Pt denies bldg/lof/change in discharge, contractions, headache, vision changes, chest pain/SOB or edema. SBAR given to Dr. Nina, see note in Epic.     Aura  present for [facility] office visit via Ipad.  ID# [id number]

## 2023-03-15 ENCOUNTER — ANCILLARY PROCEDURE (OUTPATIENT)
Dept: ULTRASOUND IMAGING | Facility: HOSPITAL | Age: 33
End: 2023-03-15
Attending: OBSTETRICS & GYNECOLOGY
Payer: MEDICAID

## 2023-03-15 ENCOUNTER — OFFICE VISIT (OUTPATIENT)
Dept: MATERNAL FETAL MEDICINE | Facility: HOSPITAL | Age: 33
End: 2023-03-15
Attending: OBSTETRICS & GYNECOLOGY
Payer: MEDICAID

## 2023-03-15 DIAGNOSIS — O09.293 PRIOR PREGNANCY WITH FETAL DEMISE AND CURRENT PREGNANCY IN THIRD TRIMESTER: ICD-10-CM

## 2023-03-15 DIAGNOSIS — Z87.59 HISTORY OF STILLBIRTH: Primary | ICD-10-CM

## 2023-03-15 DIAGNOSIS — O43.199 MARGINAL INSERTION OF UMBILICAL CORD AFFECTING MANAGEMENT OF MOTHER: ICD-10-CM

## 2023-03-15 PROCEDURE — 76819 FETAL BIOPHYS PROFIL W/O NST: CPT

## 2023-03-15 PROCEDURE — 99207 PR NO CHARGE LOS: CPT | Performed by: OBSTETRICS & GYNECOLOGY

## 2023-03-15 PROCEDURE — 76819 FETAL BIOPHYS PROFIL W/O NST: CPT | Mod: 26 | Performed by: OBSTETRICS & GYNECOLOGY

## 2023-03-15 NOTE — NURSING NOTE
Aura   present for MFM office visit via iPad. Patient reports positive fetal movement, denies pain, denies contractions, leaking of fluid, or bleeding. Patient denies headache, visual changes, nausea/vomiting, epigastric pain related to preeclampsia. SBAR given to MFM MD, see their note in Epic.    Modesta Lucio RN

## 2023-03-17 ENCOUNTER — PATIENT OUTREACH (OUTPATIENT)
Dept: CARE COORDINATION | Facility: CLINIC | Age: 33
End: 2023-03-17

## 2023-03-17 ENCOUNTER — APPOINTMENT (OUTPATIENT)
Dept: CARE COORDINATION | Facility: CLINIC | Age: 33
End: 2023-03-17
Payer: MEDICAID

## 2023-03-17 NOTE — PROGRESS NOTES
Clinic Care Coordination Contact  Program: Health Insurance/County Benefits   County:  [facility] Case #:  County Worker:   Nancy #:   Subscriber #:   Renewal:  Date Applied:     FRW Outreach:   3/17/23: FRW called pt and we called [facility] and spoke with Gume. Gume took down income information and her insurance will be active in 2 weeks. FRW and pt will apply for [facility] benefits on 3/31.  3/2/23: I called pt and scheduled appointment to apply for health insurance and county benefits on 3/17.    Health Insurance:      Referral/Screening:  Financial Resource Worker Screening  [facility] Benefits  Is patient requesting help applying for [facility] benefits?: Yes  Have you recently applied for any county benefits?: No  How many people in your household?: 4  Do you buy/eat food together?: Yes  What is the monthly gross income for the household (wages, social security, workers comp, and pension)? : 3600     [place]:  [place] No  Is this an insurance renewal?: No  Is this a new insurance application request?: Yes  Have you recently applied for insurance?: No  How many people in your household? : 4  Do you file taxes?: Yes  How many dependents do you claim?: 2  What is the monthly gross income for the household (wages, social security, workers comp, and pension)? : 3600      Care Coordination team will tell patient:   Thank you for answering all the questions, based on screening questions, our Financial Resource Worker will reach out to you with additional questions and next steps.

## 2023-03-22 ENCOUNTER — OFFICE VISIT (OUTPATIENT)
Dept: MATERNAL FETAL MEDICINE | Facility: HOSPITAL | Age: 33
End: 2023-03-22
Attending: OBSTETRICS & GYNECOLOGY
Payer: MEDICAID

## 2023-03-22 ENCOUNTER — ANCILLARY PROCEDURE (OUTPATIENT)
Dept: ULTRASOUND IMAGING | Facility: HOSPITAL | Age: 33
End: 2023-03-22
Attending: OBSTETRICS & GYNECOLOGY
Payer: MEDICAID

## 2023-03-22 ENCOUNTER — PRENATAL OFFICE VISIT (OUTPATIENT)
Dept: FAMILY MEDICINE | Facility: CLINIC | Age: 33
End: 2023-03-22
Payer: MEDICAID

## 2023-03-22 VITALS
RESPIRATION RATE: 16 BRPM | HEART RATE: 85 BPM | TEMPERATURE: 98.9 F | SYSTOLIC BLOOD PRESSURE: 110 MMHG | DIASTOLIC BLOOD PRESSURE: 62 MMHG | BODY MASS INDEX: 31.23 KG/M2 | OXYGEN SATURATION: 99 % | WEIGHT: 165.4 LBS | HEIGHT: 61 IN

## 2023-03-22 DIAGNOSIS — O43.199 MARGINAL INSERTION OF UMBILICAL CORD AFFECTING MANAGEMENT OF MOTHER: ICD-10-CM

## 2023-03-22 DIAGNOSIS — O09.90 SUPERVISION OF HIGH RISK PREGNANCY, ANTEPARTUM: Primary | ICD-10-CM

## 2023-03-22 DIAGNOSIS — O09.293 PRIOR PREGNANCY WITH FETAL DEMISE AND CURRENT PREGNANCY IN THIRD TRIMESTER: Primary | ICD-10-CM

## 2023-03-22 DIAGNOSIS — O09.293 PRIOR PREGNANCY WITH FETAL DEMISE AND CURRENT PREGNANCY IN THIRD TRIMESTER: ICD-10-CM

## 2023-03-22 PROCEDURE — 76819 FETAL BIOPHYS PROFIL W/O NST: CPT | Mod: 26 | Performed by: OBSTETRICS & GYNECOLOGY

## 2023-03-22 PROCEDURE — 87653 STREP B DNA AMP PROBE: CPT | Performed by: FAMILY MEDICINE

## 2023-03-22 PROCEDURE — 99207 PR PRENATAL VISIT: CPT | Performed by: FAMILY MEDICINE

## 2023-03-22 PROCEDURE — 99207 PR NO CHARGE LOS: CPT | Performed by: OBSTETRICS & GYNECOLOGY

## 2023-03-22 PROCEDURE — 76819 FETAL BIOPHYS PROFIL W/O NST: CPT

## 2023-03-22 NOTE — PROGRESS NOTES
"Assessment/Plan:   32 year old  at 35w5d    Supervision of high risk pregnancy, antepartum  Doing well.    Seeing MFM weekly due to hx IUFD.  Discussed pain meds in labor.   GBS obtained today.      Return in 1 week (on 3/29/2023) for prenatal care.  Subjective:   Carlos Ivan is a 32 year old  at 35w5d who is seen for routine prenatal care.   Doing well.  Endorses normal fetal movement.    Objective   Vitals: /62   Pulse 85   Temp 98.9  F (37.2  C) (Oral)   Resp 16   Ht 1.55 m (5' 1.02\")   Wt 75 kg (165 lb 6.4 oz)   LMP 2022 (Approximate)   SpO2 99%   BMI 31.23 kg/m     Total weight gain:  Not found.   Exam: Per flowsheet     "

## 2023-03-22 NOTE — PATIENT INSTRUCTIONS
Information from Your Family Doctor  Managing Pain in Labor  Am Hiro Physician. 2021 Mar 15;103(6):online.      How painful will my labor be?  There is no way to know what your labor will be like. Pain during childbirth is different for everyone. Some women need little or no pain relief. Others find that pain medicine gives them better control over their labor and delivery. Women who regularly take opioid medications may find it harder to control their pain during labor.    What are my choices for pain relief?  Pain can be managed with or without medicine. A trained personal labor assistant (also called a ) can help you manage your pain without using medicine by offering encouragement and support. Other ways to manage pain without medicine include soaking your body in water (at body temperature) or changing your position during the first part of labor. Standing or sitting up may feel better than lying down.    If you choose to use pain medicine, there are many options. Some pain medicines, like opioids, go directly into your veins through an IV. If you have an epidural or spinal, the pain medicine is injected into your back.    When do I need to decide what kind of pain relief I want?  Knowing your choices ahead of time may help your delivery go more smoothly.    Your doctors and nurses during labor and delivery can help you decide which to use. Keep your options open. Some women plan to use pain medicine, but then decide they do not need or want it. Others plan not to have any pain management but change their mind during labor. You should not feel pressured to do one or the other.    What are opioids?  Opioids are a type of pain medicine that can be given during labor. They are usually given through an IV. They are given only in small doses and only early in labor to minimize side effects.    What is the difference between a spinal and an epidural?  A spinal and an epidural are used to relieve pain or numb the  lower part of the body. For both, a needle is placed in the lower back.    A spinal is one shot of medicine, and the needle is taken out right away. The medicine makes you numb for a short time, and then wears off. It is usually used for short procedures when the doctor knows how long you will need pain relief, such as a planned  (mhu-CRA-[id number]) delivery (called a  for short).    An epidural is a catheter (very small tube that is soft and bendable) that is put through the needle into the area near the spine. The needle is then removed, leaving the tube. Medicine can be given through this tube continuously. Your doctor can give you more or less medicine as needed. An epidural is used when the doctor does not know how long pain relief is needed, such as during labor.    What if I need a ?  Some women have planned C-sections, and some are not planned. A spinal is usually used for a planned . If you have an unplanned , you may already have an epidural from labor, or you may get a new spinal. It is rare to need general anesthesia (go to sleep) for a .    Does an epidural or spinal hurt?  The area where the epidural or spinal is given will be numbed, so there is only a little pain. You will probably feel some pressure. It can be uncomfortable, but most women find that the pain relief it gives during delivery is worth it.    Will I feel contractions, and will I be able to push?  If you get a spinal or epidural, you should still be aware of the contractions and be able to push. There is a small risk that you won't be able to feel when you have contractions. You may have to push for longer if you have an epidural or spinal. An epidural or spinal does not make it more likely that you will need a  or that the doctor will need to use a vacuum or forceps tool to get your baby out.    What are the side effects of pain medicines?  Less than 1% of patients get  headaches after a spinal or epidural. The headache may last a few days, but it can be treated. Some women have low blood pressure for a short time after a spinal or epidural and might need extra medicine to bring the blood pressure back up. Opioids can make the mother or baby feel sleepy.    Will I have pain after my delivery?  It is common to have pain after delivery. Some women have more pain than others. After a vaginal delivery, many women still have some pain in their stomach, vagina, and breasts. After a , many women have pain near their incision scar. Your doctor will treat your specific type of pain and will try not to prescribe unnecessary opioid medicines because they can be addictive.    Where can I get more information?  Your doctor    American College of Obstetricians and Gynecologists  https://www.acog.org/patient-resources/faqs/labor-delivery-and-postpartum-care/medications-for-pain-relief-during-labor-and-delivery      This handout is provided to you by your family doctor and the American Academy of Family Physicians. Other health-related information is available from the AAFP online at http://familydoctor.org.

## 2023-03-22 NOTE — NURSING NOTE
Carlos Ivan is a  at 35w5d who presents to [facility] for BPP. Pt reports positive fetal movement. Pt denies bldg/lof/change in discharge, contractions, headache, vision changes, chest pain/SOB or edema. SBAR given to Dr. Lauren, see note in Epic.

## 2023-03-22 NOTE — PROGRESS NOTES
"Please see \"Imaging\" tab under Chart Review for full details.    Ivy Lauren MD  Maternal Fetal Medicine    "

## 2023-03-23 LAB — GP B STREP DNA SPEC QL NAA+PROBE: NEGATIVE

## 2023-03-27 ENCOUNTER — PATIENT OUTREACH (OUTPATIENT)
Dept: CARE COORDINATION | Facility: CLINIC | Age: 33
End: 2023-03-27
Payer: MEDICAID

## 2023-03-27 NOTE — PROGRESS NOTES
Clinic Care Coordination Contact     Community Health Worker Follow Up    Care Gaps:   Health Maintenance Due   Topic Date Due     ADVANCE CARE PLANNING  Never done     COVID-19 Vaccine (3 - Booster for Moderna series) 07/11/2021     Declined due to unsure reson   Care Plan:   Care Plan: Health Insurance     Problem: Medicaid     Goal: Patient would like to explore if she could qualify for MA and [facility] SNAP in the next 6 months.     Start Date: 12/2/2022 Expected End Date: 6/2/2023    This Visit's Progress: 40% Recent Progress: 30%    Note:     Barriers: language barrier  Strengths: Accepting of support  Patient expressed understanding of goal: Yes     Action steps to achieve this goal:   1.  I will continue to answer my phone when FRW calls.   2.  I will provide all necessary documentation and information to complete a [place] application.   3.  I will return the call and update CCC Team regarding this goal                       Intervention and Education during outreach:  -Patient stated she has not received update letter in the mail from the [facility] regarding insurance and SNAP benefits.   -Patient already has new born car seat and connected with [facility].   -Patient is aware of upcoming OB and other appointments.   -Patient does not need additional resources or coordination assistance at this time.  -Patient was informed to call with questions or concerns.     CHW Next Outreach: In one month.

## 2023-03-28 ENCOUNTER — PRENATAL OFFICE VISIT (OUTPATIENT)
Dept: FAMILY MEDICINE | Facility: CLINIC | Age: 33
End: 2023-03-28

## 2023-03-28 VITALS
HEIGHT: 61 IN | RESPIRATION RATE: 16 BRPM | WEIGHT: 165.75 LBS | HEART RATE: 85 BPM | BODY MASS INDEX: 31.29 KG/M2 | OXYGEN SATURATION: 98 % | SYSTOLIC BLOOD PRESSURE: 98 MMHG | DIASTOLIC BLOOD PRESSURE: 66 MMHG | TEMPERATURE: 98.2 F

## 2023-03-28 DIAGNOSIS — O09.90 SUPERVISION OF HIGH RISK PREGNANCY, ANTEPARTUM: Primary | ICD-10-CM

## 2023-03-28 PROCEDURE — 99207 PR PRENATAL VISIT: CPT | Performed by: FAMILY MEDICINE

## 2023-03-28 ASSESSMENT — ASTHMA QUESTIONNAIRES
QUESTION_1 LAST FOUR WEEKS HOW MUCH OF THE TIME DID YOUR ASTHMA KEEP YOU FROM GETTING AS MUCH DONE AT WORK, SCHOOL OR AT HOME: NONE OF THE TIME
QUESTION_3 LAST FOUR WEEKS HOW OFTEN DID YOUR ASTHMA SYMPTOMS (WHEEZING, COUGHING, SHORTNESS OF BREATH, CHEST TIGHTNESS OR PAIN) WAKE YOU UP AT NIGHT OR EARLIER THAN USUAL IN THE MORNING: NOT AT ALL
ACT_TOTALSCORE: 24
QUESTION_4 LAST FOUR WEEKS HOW OFTEN HAVE YOU USED YOUR RESCUE INHALER OR NEBULIZER MEDICATION (SUCH AS ALBUTEROL): NOT AT ALL
QUESTION_2 LAST FOUR WEEKS HOW OFTEN HAVE YOU HAD SHORTNESS OF BREATH: NOT AT ALL
ACT_TOTALSCORE: 24
QUESTION_5 LAST FOUR WEEKS HOW WOULD YOU RATE YOUR ASTHMA CONTROL: WELL CONTROLLED

## 2023-03-28 NOTE — PROGRESS NOTES
"Assessment/Plan:   32 year old  at 36w4d    Supervision of high risk pregnancy, antepartum  Doing well.  Continues weekly BPP with MFM due to hx IUFD at term with last pregnancy.  Reviewed labs from last visit. GBS: negative  Discussed when to call/go in for labor (card given with info)    Return in 1 week (on 2023) for prenatal care.  Subjective:   Carlos Ivan is a 32 year old  at 36w4d who is seen for routine prenatal care.   Doing well.  Endorses normal fetal movement.    Objective   Vitals: BP 98/66   Pulse 85   Temp 98.2  F (36.8  C) (Oral)   Resp 16   Ht 1.55 m (5' 1.02\")   Wt 75.2 kg (165 lb 12 oz)   LMP 2022 (Approximate)   SpO2 98%   BMI 31.30 kg/m     Total weight gain:  Not found.   Exam: Per flowsheet     "

## 2023-03-28 NOTE — PATIENT INSTRUCTIONS
"  Patient Education     Recognizing Labor  The beginning of labor is the beginning of birth. You ll start to feel strong contractions. That s when the muscles of your uterus tighten up to help push your baby out during birth.     Yes, labor has likely started   Signs of labor include:  Your contractions are getting stronger and more painful instead of weaker. You ll likely feel them throughout your whole uterus.  Your contractions are regular. This means that you feel them about every 5 to 10 minutes. And they are getting closer together.  You have pink-colored or blood-streaked fluid from your vagina.  You feel that the baby has \"dropped\" lower in your pelvis   Your water breaks. It may be a gush or a slow trickle of clear fluid from your vagina.  No, it s likely not real labor   Signs of false labor include:  Your contractions aren t regular or strong.  You feel the contractions only in your lower uterus.  Your contractions go away when you walk or change position.  Your contractions go away after drinking fluids.  When to call your healthcare provider   Call [facility] at [phone]  if you notice any of these signs:   Fluid from your vagina, with or without contractions.  Bleeding heavy enough that you need a sanitary pad.  You don t feel your baby moving as much as before.  Your contractions are strong, lasting about 1 minute and occuring every 3-4 minutes    Note: Contractions are timed by both of these measures:   The length of each contraction from its start to its finish.  How far apart the contractions are --the time between the start of one contraction and the start of the next contraction.  [employer] last reviewed this educational content on 8/1/2020 2000-2021 [employer]. All rights reserved. This information is not intended as a substitute for professional medical care. Always follow your healthcare professional's instructions.          "

## 2023-03-31 ENCOUNTER — OFFICE VISIT (OUTPATIENT)
Dept: MATERNAL FETAL MEDICINE | Facility: HOSPITAL | Age: 33
End: 2023-03-31
Attending: OBSTETRICS & GYNECOLOGY
Payer: MEDICAID

## 2023-03-31 ENCOUNTER — APPOINTMENT (OUTPATIENT)
Dept: CARE COORDINATION | Facility: CLINIC | Age: 33
End: 2023-03-31
Payer: MEDICAID

## 2023-03-31 ENCOUNTER — ANCILLARY PROCEDURE (OUTPATIENT)
Dept: ULTRASOUND IMAGING | Facility: HOSPITAL | Age: 33
End: 2023-03-31
Attending: OBSTETRICS & GYNECOLOGY
Payer: MEDICAID

## 2023-03-31 ENCOUNTER — PATIENT OUTREACH (OUTPATIENT)
Dept: CARE COORDINATION | Facility: CLINIC | Age: 33
End: 2023-03-31

## 2023-03-31 DIAGNOSIS — O09.293 PRIOR PREGNANCY WITH FETAL DEMISE AND CURRENT PREGNANCY IN THIRD TRIMESTER: ICD-10-CM

## 2023-03-31 DIAGNOSIS — O09.293 PRIOR PREGNANCY WITH FETAL DEMISE AND CURRENT PREGNANCY IN THIRD TRIMESTER: Primary | ICD-10-CM

## 2023-03-31 PROCEDURE — 76819 FETAL BIOPHYS PROFIL W/O NST: CPT | Mod: 26 | Performed by: OBSTETRICS & GYNECOLOGY

## 2023-03-31 PROCEDURE — 99207 PR NO CHARGE LOS: CPT | Performed by: OBSTETRICS & GYNECOLOGY

## 2023-03-31 PROCEDURE — 76816 OB US FOLLOW-UP PER FETUS: CPT | Mod: 26 | Performed by: OBSTETRICS & GYNECOLOGY

## 2023-03-31 PROCEDURE — 76819 FETAL BIOPHYS PROFIL W/O NST: CPT

## 2023-03-31 NOTE — NURSING NOTE
"Patient reports positive fetal movement, denies pain, denies contractions, leaking of fluid, or bleeding.  Reports \"not able to sleep the last 4 nights\". Encouraged patient to reach out to her primary OB. SBAR given to AKIN WOODWARD, see their note in Epic.      "

## 2023-03-31 NOTE — PROGRESS NOTES
Clinic Care Coordination Contact  Program: Health Insurance/County Benefits   County:  [place] Case #:  [facility] Worker:   Nancy #:   Subscriber #:   Renewal:  Date Applied:     FRW Outreach:  3/31/23: FRW called pt at scheduled time to complete county benefit application. Online portal for SNAP/CASH is down. FRW and pt will apply on 4/4.  3/17/23: FRW called pt and we called [facility] and spoke with Gume. Gume took down income information and her insurance will be active in 2 weeks. FRW and pt will apply for county benefits on 3/31.  3/2/23: I called pt and scheduled appointment to apply for health insurance and county benefits on 3/17.    Health Insurance:      Referral/Screening:  Financial Resource Worker Screening  [facility] [place] [facility] Benefits  Is patient requesting help applying for [facility] benefits?: Yes  Have you recently applied for any [facility] benefits?: No  How many people in your household?: 4  Do you buy/eat food together?: Yes  What is the monthly gross income for the household (wages, social security, workers comp, and pension)? : 3600     [place]:  [place] No  Is this an insurance renewal?: No  Is this a new insurance application request?: Yes  Have you recently applied for insurance?: No  How many people in your household? : 4  Do you file taxes?: Yes  How many dependents do you claim?: 2  What is the monthly gross income for the household (wages, social security, workers comp, and pension)? : 3600      Care Coordination team will tell patient:   Thank you for answering all the questions, based on screening questions, our Financial Resource Worker will reach out to you with additional questions and next steps.

## 2023-04-04 ENCOUNTER — PATIENT OUTREACH (OUTPATIENT)
Dept: CARE COORDINATION | Facility: CLINIC | Age: 33
End: 2023-04-04

## 2023-04-04 ENCOUNTER — APPOINTMENT (OUTPATIENT)
Dept: CARE COORDINATION | Facility: CLINIC | Age: 33
End: 2023-04-04
Payer: MEDICAID

## 2023-04-04 NOTE — PROGRESS NOTES
Clinic Care Coordination Contact  Program: Health Insurance/County Benefits   County:  [facility] Case #:  [facility] Worker:   Nancy #:   Subscriber #:   Renewal:  Date Applied:     FRW Outreach:  4/4/23: FRW called pt. Pt applied for [facility] benefits the first week in February. FRW explained the process times. FRW and pt will connect in 2 more weeks.   3/31/23: FRW called pt at scheduled time to complete [facility] benefit application. Online portal for SNAP/CASH is down. FRW and pt will apply on 4/4.  3/17/23: FRW called pt and we called [facility] and spoke with Gume. Gume took down income information and her insurance will be active in 2 weeks. FRW and pt will apply for [facility] benefits on 3/31.  3/2/23: I called pt and scheduled appointment to apply for health insurance and county benefits on 3/17.    Health Insurance:      Referral/Screening:  Financial Resource Worker Screening  [facility] Benefits  Is patient requesting help applying for [facility] benefits?: Yes  Have you recently applied for any [facility] benefits?: No  How many people in your household?: 4  Do you buy/eat food together?: Yes  What is the monthly gross income for the household (wages, social security, workers comp, and pension)? : 3600     Ins[place]:  [place]?: No  Is this an insurance renewal?: No  Is this a new insurance application request?: Yes  Have you recently applied for insurance?: No  How many people in your household? : 4  Do you file taxes?: Yes  How many dependents do you claim?: 2  What is the monthly gross income for the household (wages, social security, workers comp, and pension)? : 3600      Care Coordination team will tell patient:   Thank you for answering all the questions, based on screening questions, our Financial Resource Worker will reach out to you with additional questions and next steps.

## 2023-04-05 ENCOUNTER — OFFICE VISIT (OUTPATIENT)
Dept: MATERNAL FETAL MEDICINE | Facility: HOSPITAL | Age: 33
End: 2023-04-05
Attending: OBSTETRICS & GYNECOLOGY
Payer: MEDICAID

## 2023-04-05 ENCOUNTER — ANCILLARY PROCEDURE (OUTPATIENT)
Dept: ULTRASOUND IMAGING | Facility: HOSPITAL | Age: 33
End: 2023-04-05
Attending: OBSTETRICS & GYNECOLOGY
Payer: MEDICAID

## 2023-04-05 ENCOUNTER — PRENATAL OFFICE VISIT (OUTPATIENT)
Dept: FAMILY MEDICINE | Facility: CLINIC | Age: 33
End: 2023-04-05
Payer: MEDICAID

## 2023-04-05 VITALS
RESPIRATION RATE: 16 BRPM | SYSTOLIC BLOOD PRESSURE: 104 MMHG | HEART RATE: 91 BPM | OXYGEN SATURATION: 98 % | BODY MASS INDEX: 31.29 KG/M2 | HEIGHT: 61 IN | TEMPERATURE: 98.1 F | WEIGHT: 165.75 LBS | DIASTOLIC BLOOD PRESSURE: 69 MMHG

## 2023-04-05 DIAGNOSIS — O09.293 PRIOR PREGNANCY WITH FETAL DEMISE AND CURRENT PREGNANCY IN THIRD TRIMESTER: ICD-10-CM

## 2023-04-05 DIAGNOSIS — Z87.59 HISTORY OF STILLBIRTH: Primary | ICD-10-CM

## 2023-04-05 DIAGNOSIS — Z87.59 HISTORY OF STILLBIRTH: ICD-10-CM

## 2023-04-05 DIAGNOSIS — O09.90 HIGH-RISK PREGNANCY, UNSPECIFIED TRIMESTER: Primary | ICD-10-CM

## 2023-04-05 PROCEDURE — 76819 FETAL BIOPHYS PROFIL W/O NST: CPT

## 2023-04-05 PROCEDURE — 99207 PR COMPLICATED OB VISIT: CPT | Performed by: FAMILY MEDICINE

## 2023-04-05 PROCEDURE — 99207 PR NO CHARGE LOS: CPT | Performed by: STUDENT IN AN ORGANIZED HEALTH CARE EDUCATION/TRAINING PROGRAM

## 2023-04-05 PROCEDURE — 76819 FETAL BIOPHYS PROFIL W/O NST: CPT | Mod: 26 | Performed by: STUDENT IN AN ORGANIZED HEALTH CARE EDUCATION/TRAINING PROGRAM

## 2023-04-05 ASSESSMENT — ASTHMA QUESTIONNAIRES
QUESTION_4 LAST FOUR WEEKS HOW OFTEN HAVE YOU USED YOUR RESCUE INHALER OR NEBULIZER MEDICATION (SUCH AS ALBUTEROL): NOT AT ALL
QUESTION_1 LAST FOUR WEEKS HOW MUCH OF THE TIME DID YOUR ASTHMA KEEP YOU FROM GETTING AS MUCH DONE AT WORK, SCHOOL OR AT HOME: NONE OF THE TIME
ACT_TOTALSCORE: 24
QUESTION_2 LAST FOUR WEEKS HOW OFTEN HAVE YOU HAD SHORTNESS OF BREATH: NOT AT ALL
ACT_TOTALSCORE: 24
QUESTION_5 LAST FOUR WEEKS HOW WOULD YOU RATE YOUR ASTHMA CONTROL: WELL CONTROLLED
QUESTION_3 LAST FOUR WEEKS HOW OFTEN DID YOUR ASTHMA SYMPTOMS (WHEEZING, COUGHING, SHORTNESS OF BREATH, CHEST TIGHTNESS OR PAIN) WAKE YOU UP AT NIGHT OR EARLIER THAN USUAL IN THE MORNING: NOT AT ALL

## 2023-04-05 NOTE — PROGRESS NOTES
Please see the full imaging report from the ViewPoint program under the imaging tab.    Roxann Muhammad MD  Maternal Fetal Medicine

## 2023-04-05 NOTE — NURSING NOTE
Carlos Gilmore Moncho is a  at 37w5d who presents to [facility] for BPP. Pt reports positive fetal movement. Pt denies bldg/lof/change in discharge, contractions, headache, vision changes, chest pain/SOB or edema. SBAR given to Dr. Muhammad, see note in Epic.

## 2023-04-05 NOTE — PROGRESS NOTES
"SUBJECTIVE:  Carlos Ivan  at 37w5d. Estimated Date of Delivery: 2023.  She is doing well. She denies vaginal bleeding, leakage of fluid, or urinary symptoms. Fetal movement is present. She is not having contractions.  Concerns: none  ROS  Negative except as noted above in HPI  OBJECTIVE:  Blood pressure 104/69, pulse 91, temperature 98.1  F (36.7  C), temperature source Oral, resp. rate 16, height 1.55 m (5' 1.02\"), weight 75.2 kg (165 lb 12 oz), last menstrual period 2022, SpO2 98 %, not currently breastfeeding.  Gen: Comfortable, no acute distress.  Abd: Gravid, non-tender  See OB Vitals flowsheet.  ASSESSMENT/PLAN:  Carlos was seen today for prenatal care.    Diagnoses and all orders for this visit:    High-risk pregnancy, unspecified trimester    History of stillbirth: continue weekly BPPs, has one today. Would consider induction at 39 weeks.       -IUP at 37w5d:     Prenatal labs reviewed and normal.     First trimester screen done; results came back normal.     Fetal survey was done at 20 weeks and was normal. Had amniotic band and then pelviectasis, both resolved.    GBS status is negative.     Peripartum Anesthesia: the patient does not desire an epidural during labor.     Post-partum Contraception: nexplanon     Breast/Bottle: Breast and formula     Reviewed plans for getting to the hospital.    RTC in 1 week or sooner with problems.    Visit completed along with assistance of Aura .  Capri Gannon MD    "

## 2023-04-11 ENCOUNTER — PRENATAL OFFICE VISIT (OUTPATIENT)
Dept: FAMILY MEDICINE | Facility: CLINIC | Age: 33
End: 2023-04-11

## 2023-04-11 VITALS
HEIGHT: 61 IN | SYSTOLIC BLOOD PRESSURE: 98 MMHG | OXYGEN SATURATION: 99 % | DIASTOLIC BLOOD PRESSURE: 60 MMHG | WEIGHT: 168 LBS | HEART RATE: 100 BPM | BODY MASS INDEX: 31.72 KG/M2 | RESPIRATION RATE: 16 BRPM | TEMPERATURE: 98.5 F

## 2023-04-11 DIAGNOSIS — O09.90 HIGH-RISK PREGNANCY, UNSPECIFIED TRIMESTER: Primary | ICD-10-CM

## 2023-04-11 PROCEDURE — 99207 PR PRENATAL VISIT: CPT | Performed by: FAMILY MEDICINE

## 2023-04-11 ASSESSMENT — ASTHMA QUESTIONNAIRES
QUESTION_2 LAST FOUR WEEKS HOW OFTEN HAVE YOU HAD SHORTNESS OF BREATH: NOT AT ALL
QUESTION_4 LAST FOUR WEEKS HOW OFTEN HAVE YOU USED YOUR RESCUE INHALER OR NEBULIZER MEDICATION (SUCH AS ALBUTEROL): NOT AT ALL
QUESTION_5 LAST FOUR WEEKS HOW WOULD YOU RATE YOUR ASTHMA CONTROL: COMPLETELY CONTROLLED
ACT_TOTALSCORE: 25
QUESTION_1 LAST FOUR WEEKS HOW MUCH OF THE TIME DID YOUR ASTHMA KEEP YOU FROM GETTING AS MUCH DONE AT WORK, SCHOOL OR AT HOME: NONE OF THE TIME
QUESTION_3 LAST FOUR WEEKS HOW OFTEN DID YOUR ASTHMA SYMPTOMS (WHEEZING, COUGHING, SHORTNESS OF BREATH, CHEST TIGHTNESS OR PAIN) WAKE YOU UP AT NIGHT OR EARLIER THAN USUAL IN THE MORNING: NOT AT ALL
ACT_TOTALSCORE: 25

## 2023-04-11 NOTE — PROGRESS NOTES
"Assessment/Plan:   32 year old  at 38w4d    High-risk pregnancy, unspecified trimester    Doing well.    Reviewed skin-to-skin, delayed cord clamping, baby meds and vaccines.     Offered 39w induction given hx of IUFD (although that pregnancy was known to be complicated with single umbilical artery and polyhydramnios).  Pt prefers to wait for natural labor, since everything has is normal with the current pregnancy. Continues weekly BPP's at [facility].     Insurance issues:  Pt brought in pay stubs and forms today; nurse made photocopies and will give to her care team.    Follow-up in 1 week.  Subjective:   Carlos Ivan is a 32 year old  at 38w4d who is seen for routine prenatal care.   Doing well.  Endorses normal fetal movement.  No contractions, but intermittent pelvic pressure  Objective   Vitals: BP 98/60 (BP Location: Left arm, Patient Position: Sitting, Cuff Size: Adult Regular)   Pulse 100   Temp 98.5  F (36.9  C) (Oral)   Resp 16   Ht 1.55 m (5' 1.02\")   Wt 76.2 kg (168 lb)   LMP 2022 (Approximate)   SpO2 99%   BMI 31.72 kg/m     Total weight gain:  7.258 kg (16 lb)   Exam: Per flowsheet   Cervix:  FT/30/-2  "

## 2023-04-11 NOTE — PATIENT INSTRUCTIONS
Delivery Room Procedures Following a Normal Vaginal Birth    As your baby lies with you following a routine delivery, her umbilical cord still will be attached to the placenta. The cord may continue to pulsate for several minutes, supplying the baby with oxygen while she establishes her own breathing. Once the pulsing stops, the cord will be clamped and cut. (Because there are no nerves in the cord, the baby feels no pain during this procedure.) The clamp will remain in place for twenty-four to forty-eight hours, or until the cord is dry and no longer bleeds. The stump that remains after the clamp is removed will fall off sometime between one and three weeks after birth.    Once you've had a few moments to get acquainted with your baby, she will be dried to keep her from getting too cold, and a doctor or nurse will examine her briefly to make sure there are no obvious problems or abnormalities. She will be given Apgar scores, which measure her overall responsiveness. Then she will be wrapped in a blanket and given back to you.    Depending on the hospital's routine, your baby also may be weighed and measured, and receive medication before leaving the delivery room. She will receive a dose of vitamin K as well, since all newborns have slightly low levels of this vitamin (which is needed for normal blood clotting). You should feel comfortable to suggest that all of these steps wait 30 minutes to an hour while you hold your new baby and allow her to move successfully to your breast for her first feeding. Once successful, and once your baby appears to be resting on your skin, then those other steps, including the vitamin K injection, can be performed. The most important thing is to maximize the skin-to-skin contact between you and your baby as much as possible in those first minutes.    Because bacteria in the birth canal can infect a baby's eyes, your baby will be given antibiotic or antiseptic eye drops or ointment  (erythromycin ointment is commonly used), either immediately after delivery or later in the nursery, to prevent an eye infection.    At least one other important procedure must be done before either you or your  leave the delivery room: Both of you (and the baby's father) will receive matching labels bearing your name and other identifying details. After you verify the accuracy of these labels, each will be attached to your wrist and to the father's, while the other will be placed on your baby's wrist (and often to her ankle as well). Each time the child is taken from or returned to you while in the hospital, the nurse will check these bracelets to make sure they match. Many hospitals also footprint newborns as an added precaution and attach a small security device to the baby's ankle.    Last Updated 10/20/2015  Source Caring for Your Baby and Young Child: Birth to Age 5, 6th Edition (Copyright   2015 American Academy of Pediatrics)  The information contained on this Web site should not be used as a substitute for the medical care and advice of your pediatrician. There may be variations in treatment that your pediatrician may recommend based on individual facts and circumstances.

## 2023-04-12 ENCOUNTER — OFFICE VISIT (OUTPATIENT)
Dept: MATERNAL FETAL MEDICINE | Facility: HOSPITAL | Age: 33
End: 2023-04-12
Attending: OBSTETRICS & GYNECOLOGY
Payer: MEDICAID

## 2023-04-12 ENCOUNTER — ANCILLARY PROCEDURE (OUTPATIENT)
Dept: ULTRASOUND IMAGING | Facility: HOSPITAL | Age: 33
End: 2023-04-12
Attending: OBSTETRICS & GYNECOLOGY
Payer: MEDICAID

## 2023-04-12 DIAGNOSIS — Z87.59 HISTORY OF STILLBIRTH: ICD-10-CM

## 2023-04-12 DIAGNOSIS — O09.292 PRIOR PREGNANCY WITH FETAL DEMISE AND CURRENT PREGNANCY IN SECOND TRIMESTER: Primary | ICD-10-CM

## 2023-04-12 DIAGNOSIS — O43.199 MARGINAL INSERTION OF UMBILICAL CORD AFFECTING MANAGEMENT OF MOTHER: ICD-10-CM

## 2023-04-12 DIAGNOSIS — O09.293 PRIOR PREGNANCY WITH FETAL DEMISE AND CURRENT PREGNANCY IN THIRD TRIMESTER: ICD-10-CM

## 2023-04-12 PROCEDURE — 76819 FETAL BIOPHYS PROFIL W/O NST: CPT

## 2023-04-12 PROCEDURE — 99207 PR NO CHARGE LOS: CPT | Performed by: OBSTETRICS & GYNECOLOGY

## 2023-04-12 PROCEDURE — 76819 FETAL BIOPHYS PROFIL W/O NST: CPT | Mod: 26 | Performed by: OBSTETRICS & GYNECOLOGY

## 2023-04-12 NOTE — PROGRESS NOTES
The patient was seen for an ultrasound in the Maternal-Fetal Medicine Center at the [facility] today.  For a detailed report of the ultrasound examination, please see the ultrasound report which can be found under the imaging tab.    If you have questions regarding today's evaluation or if we can be of further service, please contact the Maternal-Fetal Medicine Center.    Lynette Jackson MD  , OB/GYN  Maternal-Fetal Medicine  [phone] (Pager)

## 2023-04-12 NOTE — NURSING NOTE
Patient reports normal fetal movement, denies pain, contractions, leaking of fluid, or bleeding. Patient denies headache, visual changes, nausea/vomiting, epigastric pain related to preeclampsia. SBAR given to AKIN WOODWARD, see their note in Epic.

## 2023-04-13 ENCOUNTER — PATIENT OUTREACH (OUTPATIENT)
Dept: CARE COORDINATION | Facility: CLINIC | Age: 33
End: 2023-04-13
Payer: MEDICAID

## 2023-04-13 NOTE — PROGRESS NOTES
Clinic Care Coordination Contact  Care Coordination Clinician Chart Review    Situation: Patient chart reviewed by Care Coordinator.       Background: Care Coordination Program started: 10/24/2022. Initial assessment completed and patient-centered care plan(s) were developed with participation from patient. Lead CC handed patient off to CHW for continued outreaches.       Assessment: Per chart review, patient outreach completed by CC CHW on 3/27/2023.  Patient is actively working to accomplish goal(s). Patient's goal(s) appropriate and relevant at this time. Patient is not due for updated Plan of Care.  Assessments will be completed annually or as needed/with change of patient status. Patient continues to work with FRW on county benefits and insurance.       Care Plan: Health Insurance     Problem: Medicaid     Goal: Patient would like to explore if she could qualify for [place] and [facility] SNAP in the next 6 months.     Start Date: 12/2/2022 Expected End Date: 6/2/2023    This Visit's Progress: 40% Recent Progress: 30%    Note:     Barriers: language barrier  Strengths: Accepting of support  Patient expressed understanding of goal: Yes     Action steps to achieve this goal:   1.  I will continue to answer my phone when FRW calls.   2.  I will provide all necessary documentation and information to complete a M[employer]ure application.   3.  I will return the call and update CCC Team regarding this goal                              Plan/Recommendations: The patient will continue working with Care Coordination to achieve goal(s) as above. CHW will continue outreaches at minimum every 30 days and will involve Lead CC as needed or if patient is ready to move to Maintenance. Lead CC will continue to monitor CHW outreaches and patient's progress to goal(s) every 6 weeks.     Plan of Care updated and sent to patient: No

## 2023-04-13 NOTE — LETTER
[facility]  Patient Centered Plan of Care  About Me:        Patient Name:  Carlos Ivan    YOB: 1990  Age:         32 year old   Stacey MRN:    [id number] Telephone Information:  Home Phone [phone]   Mobile [phone]       Address:  [address]  [place] [address] Email address:  No e-mail address on record      Emergency Contact(s)    Name Relationship Lgl Grd Work Phone Home Phone Mobile Phone   1. JOSE ADAMS Spouse   [phone]    2. CAMERON HOWARD Brother-in-Law   [phone]            Primary language:  Aura     needed? Yes   [employer]:  [phone] op. 1  Other communication barriers:Language barrier    Preferred Method of Communication:     Current living arrangement: I live in a private home with family    Mobility Status/ Medical Equipment: Independent        Health Maintenance  Health Maintenance Reviewed: Due/Overdue       My Access Plan  Medical Emergency 911   Primary Clinic Line [facility] [phone]   24 Hour Appointment Line [phone] or  [phone]) (toll-free)   24 Hour Nurse Line [phone] (toll-free)   Preferred Urgent Care [facility] [phone]       [facility]  [phone]       Preferred Pharmacy [facility] - [address]     Behavioral Health Crisis Line The National Suicide Prevention Lifeline at 1-881.135.1542 or Text/Call 858             My Care Team Members  Patient Care Team         Relationship Specialty Notifications Start End    Tosin Lindo MD PCP - General Family Medicine  12/17/22     Phone: [phone] Fax: [phone]         [address] [place] [address]    Tosin Lindo MD Assigned PCP   9/24/22     Phone: [phone] Fax: [phone]         [address] [place] [address]    Ld Rebolledo Community Health Worker Primary Care - CC  Admissions 10/24/22     Phone: [phone] Fax: [phone]         [address] [place] [address] [address] [facility] [address]    Janna Delgadillo RN Lead Care Coordinator Primary Care - CC Admissions 10/25/22     Jia Stoner Financial Resource Worker   2/28/23     Phone: [phone]         Lynette Jackson MD Assigned OBGYN Provider   4/29/23     Phone: [phone] Fax: [phone]         [address] [place] [address] [address] [facility] [address]              My Care Plans  Self Management and Treatment Plan  Care Plan  Care Plan: Health Insurance       Problem: Medicaid       Goal: Patient would like to explore if she could qualify for [place] and [facility] SNAP in the next 6 months.       Start Date: 12/2/2022 Expected End Date: 6/2/2023    This Visit's Progress: 50% Recent Progress: 40%    Note:     Barriers: language barrier  Strengths: Accepting of support  Patient expressed understanding of goal: Yes     Action steps to achieve this goal:   1.  I will continue to answer my phone when FRW calls.   2.  I will provide all necessary documentation and information to complete a MNSure application.   3.  I will return the call and update CCC Team regarding this goal                                  Action Plans on File:   Asthma                   Advance Care Plans/Directives Type:   No data recorded    My Medical and Care Information  Problem List   Patient Active Problem List   Diagnosis     Betel deposit on teeth     History of stillbirth     Asthma     Anemia during pregnancy     Lactating mother      Current Medications and Allergies:  See printed Medication Report.    Care Coordination Start Date: 10/24/2022   Frequency of Care Coordination: monthly       Form Last Updated: 05/03/2023

## 2023-04-18 ENCOUNTER — PRENATAL OFFICE VISIT (OUTPATIENT)
Dept: FAMILY MEDICINE | Facility: CLINIC | Age: 33
End: 2023-04-18

## 2023-04-18 ENCOUNTER — PATIENT OUTREACH (OUTPATIENT)
Dept: CARE COORDINATION | Facility: CLINIC | Age: 33
End: 2023-04-18

## 2023-04-18 VITALS
HEART RATE: 87 BPM | DIASTOLIC BLOOD PRESSURE: 72 MMHG | TEMPERATURE: 97.9 F | BODY MASS INDEX: 32.47 KG/M2 | OXYGEN SATURATION: 99 % | RESPIRATION RATE: 14 BRPM | HEIGHT: 61 IN | SYSTOLIC BLOOD PRESSURE: 107 MMHG | WEIGHT: 172 LBS

## 2023-04-18 DIAGNOSIS — O09.90 HIGH-RISK PREGNANCY, UNSPECIFIED TRIMESTER: Primary | ICD-10-CM

## 2023-04-18 PROCEDURE — 99207 PR PRENATAL VISIT: CPT | Performed by: FAMILY MEDICINE

## 2023-04-18 NOTE — PATIENT INSTRUCTIONS
"Patient information: When your baby is overdue (The Basics)   Written by the doctors and editors at [facility]     How does my doctor or nurse figure out my due date? -- To figure out your due date, your doctor or nurse counts 40 weeks from the first day of your last monthly period.   If your periods are not regular, counting won't give a correct due date. In that case, your doctor or nurse will do an imaging test called an ultrasound to figure out your due date. An ultrasound can create pictures of your baby when it is inside your body. Your doctor can use these pictures to measure your baby's size and figure out your due date.    When is a baby overdue? -- A baby is overdue if he or she has not been born by 42 weeks (2 weeks after the due date). A pregnancy that lasts longer than 42 weeks is also called a \"postterm pregnancy\" or \"postdated pregnancy.\"     What can cause a baby to be overdue? -- Doctors don't usually know. But a woman has a higher chance of having her baby after the due date if:  ?It's her first pregnancy  ?She's already had a baby that was born after its due date  Why is it a problem for babies to be born after 42 weeks? -- Babies born after 42 weeks have a higher chance of having problems, such as:  ?Being too big (heavier than 10 pounds) - A big baby can get hurt if it cannot easily fit through the birth canal (figure 1). A big baby can also damage the mother's body when it comes out through the birth canal. Sometimes, the mother has to have a  (surgery to get the baby out).  ?Having a bowel movement in the amniotic fluid before birth - Babies who breathe in some of their amniotic fluid with bowel movement in it can have breathing problems after they are born.     Other problems that can happen are:  ?The placenta might not work as well as it did earlier in the pregnancy - The placenta is the organ that brings the baby nutrients and oxygen and carries away waste.  ?There can be too little " "amniotic fluid (water) surrounding the baby in the uterus - If there is not enough amniotic fluid, the umbilical cord can get squeezed. If the umbilical cord is squeezed, the baby might not get enough oxygen and nutrients from the placenta.    What happens if I am still pregnant after my due date? -- If you are still pregnant after your due date, your doctor or nurse will do tests to check your baby's health. Most doctors and nurses do these tests at 41 or 42 weeks, but some women have these tests earlier. The tests can check:  ?Your baby's heartbeat  ?Your baby's breathing and movements  ?The amount of amniotic fluid that surrounds your baby in the uterus  Depending on the test results and how far along your pregnancy is, your doctor might:  ?Let your pregnancy continue until your body is ready to give birth  ?Recommend that you have the baby soon    What if my doctor or nurse recommends that I have my baby soon? -- If your doctor or nurse recommends that you have your baby soon, he or she will discuss your choices.  If your body isn't ready to give birth, your doctor can give you medicines to help start your labor. When doctors use medicines to help start a woman's labor, they call it \"inducing labor.\" These medicines often work.    Your doctor can also do a  to deliver your baby. A woman might have a  if:  ?Medicines to induce labor don't work  ?Her baby is too big to safely fit through her birth canal     Will my baby be healthy if he or she is born after the due date? -- Most babies born after their due date are healthy. But babies born after their due date can look a little different at birth and have:  ?Long and thin arms or legs  ?Dry or peeling skin  ?Long hair and nails   "

## 2023-04-18 NOTE — PROGRESS NOTES
"Assessment/Plan:   32 year old  at 39w4d    High-risk pregnancy, unspecified trimester  Doing well.  Not interested in induction this week, but might consider next week when past due date.  Discussed potential induction methods.       Return in 1 week (on 2023) for prenatal care.  Subjective:   Carlos Ivan is a 32 year old  at 39w4d who is seen for routine prenatal care.   Doing well.  Endorses normal fetal movement.    Objective   Vitals: /72   Pulse 87   Temp 97.9  F (36.6  C) (Oral)   Resp 14   Ht 1.55 m (5' 1.02\")   Wt 78 kg (172 lb)   LMP 2022 (Approximate)   SpO2 99%   BMI 32.48 kg/m     Total weight gain:  9.072 kg (20 lb)   Exam: Per flowsheet     "

## 2023-04-18 NOTE — PROGRESS NOTES
Clinic Care Coordination Contact  Program: Health Insurance/[facility] Benefits   County:  [facility] Case #:  [facility] Worker:   Nancy #:   Subscriber #:   Renewal:  Date Applied:     FRW Outreach:  4/18/23: FRW called pt. Pt received paperwork from [facility] to complete and she sent back a couple weeks ago. FRW will call [facility] for a status.  4/4/23: FRW called pt. Pt applied for [facility] benefits the first week in February. FRW explained the process times. FRW and pt will connect in 2 more weeks.   3/31/23: FRW called pt at scheduled time to complete [facility] benefit application. Online portal for SNAP/CASH is down. FRW and pt will apply on 4/4.  3/17/23: FRW called pt and we called [facility] and spoke with Gume. Gume took down income information and her insurance will be active in 2 weeks. FRW and pt will apply for [facility] benefits on 3/31.  3/2/23: I called pt and scheduled appointment to apply for health insurance and [facility] benefits on 3/17.    Health Insurance:      Referral/Screening:  Financial Resource Worker Screening  [facility] Benefits  Is patient requesting help applying for [facility] benefits?: Yes  Have you recently applied for any [facility] benefits?: No  How many people in your household?: 4  Do you buy/eat food together?: Yes  What is the monthly gross income for the household (wages, social security, workers comp, and pension)? : 3600     Insurance:  [place] for [place] insurance?: No  Is this an insurance renewal?: No  Is this a new insurance application request?: Yes  Have you recently applied for insurance?: No  How many people in your household? : 4  Do you file taxes?: Yes  How many dependents do you claim?: 2  What is the monthly gross income for the household (wages, social security, workers comp, and pension)? : 3600      Care Coordination team will tell patient:   Thank you for answering all the questions, based on screening questions, our Financial Resource Worker will reach out to you with  additional questions and next steps.

## 2023-04-19 ENCOUNTER — ANCILLARY PROCEDURE (OUTPATIENT)
Dept: ULTRASOUND IMAGING | Facility: HOSPITAL | Age: 33
End: 2023-04-19
Attending: OBSTETRICS & GYNECOLOGY
Payer: MEDICAID

## 2023-04-19 ENCOUNTER — OFFICE VISIT (OUTPATIENT)
Dept: MATERNAL FETAL MEDICINE | Facility: HOSPITAL | Age: 33
End: 2023-04-19
Attending: OBSTETRICS & GYNECOLOGY
Payer: MEDICAID

## 2023-04-19 DIAGNOSIS — O43.199 MARGINAL INSERTION OF UMBILICAL CORD AFFECTING MANAGEMENT OF MOTHER: ICD-10-CM

## 2023-04-19 DIAGNOSIS — Z87.59 HISTORY OF STILLBIRTH: Primary | ICD-10-CM

## 2023-04-19 DIAGNOSIS — Z87.59 HISTORY OF STILLBIRTH: ICD-10-CM

## 2023-04-19 PROCEDURE — 76819 FETAL BIOPHYS PROFIL W/O NST: CPT

## 2023-04-19 PROCEDURE — 99207 PR NO CHARGE LOS: CPT | Performed by: OBSTETRICS & GYNECOLOGY

## 2023-04-19 PROCEDURE — 76819 FETAL BIOPHYS PROFIL W/O NST: CPT | Mod: 26 | Performed by: OBSTETRICS & GYNECOLOGY

## 2023-04-19 NOTE — NURSING NOTE
Patient reports active fetal movement, denies pain, regular contractions, leaking of fluid, or bleeding. Patient denies headache, visual changes, nausea/vomiting, epigastric pain related to preeclampsia.   SBAR given to AKIN WOODWARD, see their note in Epic.

## 2023-04-25 ENCOUNTER — PATIENT OUTREACH (OUTPATIENT)
Dept: CARE COORDINATION | Facility: CLINIC | Age: 33
End: 2023-04-25

## 2023-04-25 ENCOUNTER — PRENATAL OFFICE VISIT (OUTPATIENT)
Dept: FAMILY MEDICINE | Facility: CLINIC | Age: 33
End: 2023-04-25
Payer: MEDICAID

## 2023-04-25 VITALS
HEART RATE: 84 BPM | DIASTOLIC BLOOD PRESSURE: 60 MMHG | SYSTOLIC BLOOD PRESSURE: 100 MMHG | HEIGHT: 61 IN | WEIGHT: 171.5 LBS | OXYGEN SATURATION: 99 % | RESPIRATION RATE: 20 BRPM | TEMPERATURE: 98.7 F | BODY MASS INDEX: 32.38 KG/M2

## 2023-04-25 DIAGNOSIS — O09.90 HIGH-RISK PREGNANCY, UNSPECIFIED TRIMESTER: Primary | ICD-10-CM

## 2023-04-25 DIAGNOSIS — O48.0 POST-TERM PREGNANCY, 40-42 WEEKS OF GESTATION: ICD-10-CM

## 2023-04-25 PROCEDURE — 59426 ANTEPARTUM CARE ONLY: CPT | Performed by: FAMILY MEDICINE

## 2023-04-25 NOTE — PROGRESS NOTES
"Assessment/Plan:   32 year old  at 40w4d    High-risk pregnancy, unspecified trimester  Post-term pregnancy, 40-42 weeks of gestation  Given her history of full-term fetal loss in the past, induction is recommended and she is agreeable.  Cervix is not favorable.  We will plan to admit her tomorrow with Cook catheter for mechanical cervical ripening, then IV Pitocin the following day.  Gave written information regarding the plan and patient is agreeable.       Subjective:   Carlos Ivan is a 32 year old  at 40w4d who is seen for routine prenatal care.   Doing well.  Endorses normal fetal movement.  No changes.    Objective   Vitals: /60 (BP Location: Left arm, Patient Position: Sitting, Cuff Size: Adult Regular)   Pulse 84   Temp 98.7  F (37.1  C) (Oral)   Resp 20   Ht 1.55 m (5' 1.02\")   Wt 77.8 kg (171 lb 8 oz)   LMP 2022 (Approximate)   SpO2 99%   BMI 32.38 kg/m     Total weight gain:  8.845 kg (19 lb 8 oz)   Exam: Per flowsheet  Cervix:  FT/50%/-3, soft, posterior   " 16

## 2023-04-25 NOTE — PROGRESS NOTES
Clinic Care Coordination Contact    Community Health Worker Follow Up    Care Gaps:   Health Maintenance Due   Topic Date Due     ADVANCE CARE PLANNING  Never done     YEARLY PREVENTIVE VISIT  11/30/2018     COVID-19 Vaccine (3 - Booster for Moderna series) 07/11/2021     Postponed to after deliver new born     Care Plan:   Care Plan: Health Insurance     Problem: Medicaid     Goal: Patient would like to explore if she could qualify for [place] and [facility] SNAP in the next 6 months.     Start Date: 12/2/2022 Expected End Date: 6/2/2023    This Visit's Progress: 50% Recent Progress: 40%    Note:     Barriers: language barrier  Strengths: Accepting of support  Patient expressed understanding of goal: Yes     Action steps to achieve this goal:   1.  I will continue to answer my phone when FRW calls.   2.  I will provide all necessary documentation and information to complete a [employer] application.   3.  I will return the call and update CCC Team regarding this goal                       Intervention and Education during outreach:  -Patient's health insurance and County benefits are pending.  -Patient was given contact to call if she needs to find out updates sooner.  -Patient was informed to call with questions or concerns.     CHW Next Outreach: In one month.    Detail Level: Detailed Add 97489 Cpt? (Important Note: In 2017 The Use Of 43274 Is Being Tracked By Cms To Determine Future Global Period Reimbursement For Global Periods): yes Wound Evaluated By (Optional): Ivy Wound Diameter In Cm(Optional): 0 Wound Crusting?: clean Sutures?: intact Wound Edema?: mild Wound Color?: pink Patient To Follow-Up With?: our clinic

## 2023-04-25 NOTE — PATIENT INSTRUCTIONS
"You are scheduled for \"induction of labor\" at [facility] tomorrow, Wednesday, 4/26/23.  Please plan on arriving to the hospital at 4:00pm.  Please call the hospital that day at about 3:00 to verify that the scheduled time still works.  Sometimes it will have to be adjusted due to staffing.    [facility]:  [phone]         "

## 2023-04-26 ENCOUNTER — TELEPHONE (OUTPATIENT)
Dept: FAMILY MEDICINE | Facility: CLINIC | Age: 33
End: 2023-04-26
Payer: MEDICAID

## 2023-04-26 NOTE — TELEPHONE ENCOUNTER
Patient was scheduled at [facility] for cervical ripening tonight/induction of labor tomorrow, but due to staffing they are unable to get her in tonight.    I called Carlos Ivan with a [employer] interprerna to let her know. We will reschedule her for tomorrow, Thursday 4/27/23 4:00pm instead.  I will call her if any changes are needed.    Of note, she reports no changes: normal fetal movement, no contractions, no leaking of fluid, etc.

## 2023-04-27 ENCOUNTER — HOSPITAL ENCOUNTER (INPATIENT)
Facility: HOSPITAL | Age: 33
LOS: 2 days | Discharge: HOME HEALTH | End: 2023-04-29
Attending: FAMILY MEDICINE | Admitting: FAMILY MEDICINE
Payer: MEDICAID

## 2023-04-27 DIAGNOSIS — O09.93 SUPERVISION OF HIGH RISK PREGNANCY, UNSPECIFIED, THIRD TRIMESTER: ICD-10-CM

## 2023-04-27 PROBLEM — Z36.89 ENCOUNTER FOR TRIAGE IN PREGNANT PATIENT: Status: ACTIVE | Noted: 2023-04-27

## 2023-04-27 PROBLEM — O48.0 POST TERM PREGNANCY, ANTEPARTUM CONDITION OR COMPLICATION: Status: ACTIVE | Noted: 2023-04-27

## 2023-04-27 PROBLEM — Z87.59 HISTORY OF STILLBIRTH: Status: ACTIVE | Noted: 2017-01-31

## 2023-04-27 PROBLEM — Z36.89 ENCOUNTER FOR TRIAGE IN PREGNANT PATIENT: Status: RESOLVED | Noted: 2023-04-27 | Resolved: 2023-04-27

## 2023-04-27 PROCEDURE — 99207 PR NO CHARGE LOS: CPT | Performed by: FAMILY MEDICINE

## 2023-04-27 PROCEDURE — 250N000013 HC RX MED GY IP 250 OP 250 PS 637: Performed by: FAMILY MEDICINE

## 2023-04-27 PROCEDURE — 120N000001 HC R&B MED SURG/OB

## 2023-04-27 RX ORDER — OXYTOCIN/0.9 % SODIUM CHLORIDE 30/500 ML
100-340 PLASTIC BAG, INJECTION (ML) INTRAVENOUS CONTINUOUS PRN
Status: DISCONTINUED | OUTPATIENT
Start: 2023-04-27 | End: 2023-04-29 | Stop reason: HOSPADM

## 2023-04-27 RX ORDER — NALOXONE HYDROCHLORIDE 0.4 MG/ML
0.2 INJECTION, SOLUTION INTRAMUSCULAR; INTRAVENOUS; SUBCUTANEOUS
Status: DISCONTINUED | OUTPATIENT
Start: 2023-04-27 | End: 2023-04-28 | Stop reason: HOSPADM

## 2023-04-27 RX ORDER — PROCHLORPERAZINE 25 MG
25 SUPPOSITORY, RECTAL RECTAL EVERY 12 HOURS PRN
Status: DISCONTINUED | OUTPATIENT
Start: 2023-04-27 | End: 2023-04-28 | Stop reason: HOSPADM

## 2023-04-27 RX ORDER — FENTANYL CITRATE 50 UG/ML
50 INJECTION, SOLUTION INTRAMUSCULAR; INTRAVENOUS EVERY 30 MIN PRN
Status: DISCONTINUED | OUTPATIENT
Start: 2023-04-27 | End: 2023-04-28 | Stop reason: HOSPADM

## 2023-04-27 RX ORDER — KETOROLAC TROMETHAMINE 30 MG/ML
30 INJECTION, SOLUTION INTRAMUSCULAR; INTRAVENOUS
Status: DISCONTINUED | OUTPATIENT
Start: 2023-04-27 | End: 2023-04-29 | Stop reason: HOSPADM

## 2023-04-27 RX ORDER — CITRIC ACID/SODIUM CITRATE 334-500MG
30 SOLUTION, ORAL ORAL
Status: DISCONTINUED | OUTPATIENT
Start: 2023-04-27 | End: 2023-04-28 | Stop reason: HOSPADM

## 2023-04-27 RX ORDER — METHYLERGONOVINE MALEATE 0.2 MG/ML
200 INJECTION INTRAVENOUS
Status: DISCONTINUED | OUTPATIENT
Start: 2023-04-27 | End: 2023-04-28 | Stop reason: HOSPADM

## 2023-04-27 RX ORDER — HYDROXYZINE HYDROCHLORIDE 50 MG/1
50 TABLET, FILM COATED ORAL
Status: DISCONTINUED | OUTPATIENT
Start: 2023-04-27 | End: 2023-04-28 | Stop reason: HOSPADM

## 2023-04-27 RX ORDER — MISOPROSTOL 200 UG/1
800 TABLET ORAL
Status: DISCONTINUED | OUTPATIENT
Start: 2023-04-27 | End: 2023-04-28 | Stop reason: HOSPADM

## 2023-04-27 RX ORDER — OXYTOCIN 10 [USP'U]/ML
10 INJECTION, SOLUTION INTRAMUSCULAR; INTRAVENOUS
Status: DISCONTINUED | OUTPATIENT
Start: 2023-04-27 | End: 2023-04-28 | Stop reason: HOSPADM

## 2023-04-27 RX ORDER — NALOXONE HYDROCHLORIDE 0.4 MG/ML
0.4 INJECTION, SOLUTION INTRAMUSCULAR; INTRAVENOUS; SUBCUTANEOUS
Status: DISCONTINUED | OUTPATIENT
Start: 2023-04-27 | End: 2023-04-28 | Stop reason: HOSPADM

## 2023-04-27 RX ORDER — OXYTOCIN 10 [USP'U]/ML
10 INJECTION, SOLUTION INTRAMUSCULAR; INTRAVENOUS
Status: DISCONTINUED | OUTPATIENT
Start: 2023-04-27 | End: 2023-04-29 | Stop reason: HOSPADM

## 2023-04-27 RX ORDER — IBUPROFEN 800 MG/1
800 TABLET, FILM COATED ORAL
Status: DISCONTINUED | OUTPATIENT
Start: 2023-04-27 | End: 2023-04-29 | Stop reason: HOSPADM

## 2023-04-27 RX ORDER — METOCLOPRAMIDE 10 MG/1
10 TABLET ORAL EVERY 6 HOURS PRN
Status: DISCONTINUED | OUTPATIENT
Start: 2023-04-27 | End: 2023-04-28 | Stop reason: HOSPADM

## 2023-04-27 RX ORDER — MISOPROSTOL 200 UG/1
400 TABLET ORAL
Status: DISCONTINUED | OUTPATIENT
Start: 2023-04-27 | End: 2023-04-28 | Stop reason: HOSPADM

## 2023-04-27 RX ORDER — TERBUTALINE SULFATE 1 MG/ML
0.25 INJECTION, SOLUTION SUBCUTANEOUS
Status: DISCONTINUED | OUTPATIENT
Start: 2023-04-27 | End: 2023-04-28 | Stop reason: HOSPADM

## 2023-04-27 RX ORDER — MORPHINE SULFATE 10 MG/ML
10 INJECTION, SOLUTION INTRAMUSCULAR; INTRAVENOUS
Status: DISCONTINUED | OUTPATIENT
Start: 2023-04-27 | End: 2023-04-28 | Stop reason: HOSPADM

## 2023-04-27 RX ORDER — LIDOCAINE 40 MG/G
CREAM TOPICAL
Status: DISCONTINUED | OUTPATIENT
Start: 2023-04-27 | End: 2023-04-27 | Stop reason: HOSPADM

## 2023-04-27 RX ORDER — ONDANSETRON 4 MG/1
4 TABLET, ORALLY DISINTEGRATING ORAL EVERY 6 HOURS PRN
Status: DISCONTINUED | OUTPATIENT
Start: 2023-04-27 | End: 2023-04-28 | Stop reason: HOSPADM

## 2023-04-27 RX ORDER — ONDANSETRON 2 MG/ML
4 INJECTION INTRAMUSCULAR; INTRAVENOUS EVERY 6 HOURS PRN
Status: DISCONTINUED | OUTPATIENT
Start: 2023-04-27 | End: 2023-04-28 | Stop reason: HOSPADM

## 2023-04-27 RX ORDER — CARBOPROST TROMETHAMINE 250 UG/ML
250 INJECTION, SOLUTION INTRAMUSCULAR
Status: DISCONTINUED | OUTPATIENT
Start: 2023-04-27 | End: 2023-04-28 | Stop reason: HOSPADM

## 2023-04-27 RX ORDER — METOCLOPRAMIDE HYDROCHLORIDE 5 MG/ML
10 INJECTION INTRAMUSCULAR; INTRAVENOUS EVERY 6 HOURS PRN
Status: DISCONTINUED | OUTPATIENT
Start: 2023-04-27 | End: 2023-04-28 | Stop reason: HOSPADM

## 2023-04-27 RX ORDER — PROCHLORPERAZINE MALEATE 10 MG
10 TABLET ORAL EVERY 6 HOURS PRN
Status: DISCONTINUED | OUTPATIENT
Start: 2023-04-27 | End: 2023-04-28 | Stop reason: HOSPADM

## 2023-04-27 RX ORDER — OXYTOCIN/0.9 % SODIUM CHLORIDE 30/500 ML
340 PLASTIC BAG, INJECTION (ML) INTRAVENOUS CONTINUOUS PRN
Status: DISCONTINUED | OUTPATIENT
Start: 2023-04-27 | End: 2023-04-28 | Stop reason: HOSPADM

## 2023-04-27 RX ADMIN — HYDROXYZINE HYDROCHLORIDE 50 MG: 50 TABLET ORAL at 21:58

## 2023-04-27 ASSESSMENT — ACTIVITIES OF DAILY LIVING (ADL)
ADLS_ACUITY_SCORE: 18
DRESSING/BATHING_DIFFICULTY: NO
ADLS_ACUITY_SCORE: 18
WEAR_GLASSES_OR_BLIND: NO
ADLS_ACUITY_SCORE: 18
ADLS_ACUITY_SCORE: 18
TOILETING_ISSUES: NO
FALL_HISTORY_WITHIN_LAST_SIX_MONTHS: NO
CHANGE_IN_FUNCTIONAL_STATUS_SINCE_ONSET_OF_CURRENT_ILLNESS/INJURY: NO
DOING_ERRANDS_INDEPENDENTLY_DIFFICULTY: NO
CONCENTRATING,_REMEMBERING_OR_MAKING_DECISIONS_DIFFICULTY: NO
DIFFICULTY_EATING/SWALLOWING: NO
WALKING_OR_CLIMBING_STAIRS_DIFFICULTY: NO

## 2023-04-27 NOTE — PROVIDER NOTIFICATION
Spoke with Dr. Lindo. Plans to place a Fatima Cath for induction. No Fatima Caths in stock currently 2 in route from [facility] will call provider for placement once catheters arrive. Okay for IA till provider arrives.

## 2023-04-27 NOTE — PLAN OF CARE
Goal Outcome Evaluation:      Plan of Care Reviewed With: patient    Overall Patient Progress: improvingOverall Patient Progress: improving         Patient will progress into active labor.

## 2023-04-28 ENCOUNTER — ANESTHESIA (OUTPATIENT)
Dept: OBGYN | Facility: HOSPITAL | Age: 33
End: 2023-04-28
Payer: MEDICAID

## 2023-04-28 ENCOUNTER — ANESTHESIA EVENT (OUTPATIENT)
Dept: OBGYN | Facility: HOSPITAL | Age: 33
End: 2023-04-28
Payer: MEDICAID

## 2023-04-28 PROBLEM — Z3A.41 POST TERM PREGNANCY, 41 WEEKS: Status: ACTIVE | Noted: 2023-04-27

## 2023-04-28 LAB
ABO/RH(D): NORMAL
ANTIBODY SCREEN: NEGATIVE
HGB BLD-MCNC: 11.3 G/DL (ref 11.7–15.7)
HOLD SPECIMEN: NORMAL
SARS-COV-2 RNA RESP QL NAA+PROBE: NEGATIVE
SPECIMEN EXPIRATION DATE: NORMAL
T PALLIDUM AB SER QL: NONREACTIVE

## 2023-04-28 PROCEDURE — 370N000003 HC ANESTHESIA WARD SERVICE: Performed by: ANESTHESIOLOGY

## 2023-04-28 PROCEDURE — 86901 BLOOD TYPING SEROLOGIC RH(D): CPT | Performed by: FAMILY MEDICINE

## 2023-04-28 PROCEDURE — 120N000001 HC R&B MED SURG/OB

## 2023-04-28 PROCEDURE — 85018 HEMOGLOBIN: CPT | Performed by: FAMILY MEDICINE

## 2023-04-28 PROCEDURE — 722N000001 HC LABOR CARE VAGINAL DELIVERY SINGLE

## 2023-04-28 PROCEDURE — 86850 RBC ANTIBODY SCREEN: CPT | Performed by: FAMILY MEDICINE

## 2023-04-28 PROCEDURE — 250N000011 HC RX IP 250 OP 636: Performed by: ANESTHESIOLOGY

## 2023-04-28 PROCEDURE — 250N000009 HC RX 250: Performed by: ANESTHESIOLOGY

## 2023-04-28 PROCEDURE — 00HU33Z INSERTION OF INFUSION DEVICE INTO SPINAL CANAL, PERCUTANEOUS APPROACH: ICD-10-PCS | Performed by: ANESTHESIOLOGY

## 2023-04-28 PROCEDURE — 86780 TREPONEMA PALLIDUM: CPT | Performed by: FAMILY MEDICINE

## 2023-04-28 PROCEDURE — 10907ZC DRAINAGE OF AMNIOTIC FLUID, THERAPEUTIC FROM PRODUCTS OF CONCEPTION, VIA NATURAL OR ARTIFICIAL OPENING: ICD-10-PCS | Performed by: FAMILY MEDICINE

## 2023-04-28 PROCEDURE — U0003 INFECTIOUS AGENT DETECTION BY NUCLEIC ACID (DNA OR RNA); SEVERE ACUTE RESPIRATORY SYNDROME CORONAVIRUS 2 (SARS-COV-2) (CORONAVIRUS DISEASE [COVID-19]), AMPLIFIED PROBE TECHNIQUE, MAKING USE OF HIGH THROUGHPUT TECHNOLOGIES AS DESCRIBED BY CMS-2020-01-R: HCPCS | Performed by: FAMILY MEDICINE

## 2023-04-28 PROCEDURE — 59410 OBSTETRICAL CARE: CPT | Mod: GC | Performed by: FAMILY MEDICINE

## 2023-04-28 PROCEDURE — 250N000009 HC RX 250: Performed by: FAMILY MEDICINE

## 2023-04-28 PROCEDURE — 3E0R3BZ INTRODUCTION OF ANESTHETIC AGENT INTO SPINAL CANAL, PERCUTANEOUS APPROACH: ICD-10-PCS | Performed by: ANESTHESIOLOGY

## 2023-04-28 PROCEDURE — 0KQM0ZZ REPAIR PERINEUM MUSCLE, OPEN APPROACH: ICD-10-PCS | Performed by: FAMILY MEDICINE

## 2023-04-28 PROCEDURE — 258N000003 HC RX IP 258 OP 636: Performed by: ANESTHESIOLOGY

## 2023-04-28 PROCEDURE — 250N000013 HC RX MED GY IP 250 OP 250 PS 637

## 2023-04-28 PROCEDURE — 36415 COLL VENOUS BLD VENIPUNCTURE: CPT | Performed by: FAMILY MEDICINE

## 2023-04-28 PROCEDURE — 258N000003 HC RX IP 258 OP 636: Performed by: FAMILY MEDICINE

## 2023-04-28 PROCEDURE — 250N000011 HC RX IP 250 OP 636: Performed by: FAMILY MEDICINE

## 2023-04-28 RX ORDER — ACETAMINOPHEN 325 MG/1
650 TABLET ORAL EVERY 4 HOURS PRN
Status: DISCONTINUED | OUTPATIENT
Start: 2023-04-28 | End: 2023-04-29 | Stop reason: HOSPADM

## 2023-04-28 RX ORDER — METHYLERGONOVINE MALEATE 0.2 MG/ML
200 INJECTION INTRAVENOUS
Status: DISCONTINUED | OUTPATIENT
Start: 2023-04-28 | End: 2023-04-29 | Stop reason: HOSPADM

## 2023-04-28 RX ORDER — OXYTOCIN 10 [USP'U]/ML
10 INJECTION, SOLUTION INTRAMUSCULAR; INTRAVENOUS
Status: DISCONTINUED | OUTPATIENT
Start: 2023-04-28 | End: 2023-04-29 | Stop reason: HOSPADM

## 2023-04-28 RX ORDER — MISOPROSTOL 200 UG/1
800 TABLET ORAL
Status: DISCONTINUED | OUTPATIENT
Start: 2023-04-28 | End: 2023-04-29 | Stop reason: HOSPADM

## 2023-04-28 RX ORDER — FENTANYL CITRATE-0.9 % NACL/PF 10 MCG/ML
100 PLASTIC BAG, INJECTION (ML) INTRAVENOUS EVERY 5 MIN PRN
Status: DISCONTINUED | OUTPATIENT
Start: 2023-04-28 | End: 2023-04-28 | Stop reason: HOSPADM

## 2023-04-28 RX ORDER — HYDROCORTISONE 25 MG/G
CREAM TOPICAL 3 TIMES DAILY PRN
Status: DISCONTINUED | OUTPATIENT
Start: 2023-04-28 | End: 2023-04-29 | Stop reason: HOSPADM

## 2023-04-28 RX ORDER — NALBUPHINE HYDROCHLORIDE 20 MG/ML
2.5-5 INJECTION, SOLUTION INTRAMUSCULAR; INTRAVENOUS; SUBCUTANEOUS EVERY 6 HOURS PRN
Status: DISCONTINUED | OUTPATIENT
Start: 2023-04-28 | End: 2023-04-29 | Stop reason: HOSPADM

## 2023-04-28 RX ORDER — LIDOCAINE HYDROCHLORIDE AND EPINEPHRINE 15; 5 MG/ML; UG/ML
INJECTION, SOLUTION EPIDURAL PRN
Status: DISCONTINUED | OUTPATIENT
Start: 2023-04-28 | End: 2023-04-28

## 2023-04-28 RX ORDER — IBUPROFEN 800 MG/1
800 TABLET, FILM COATED ORAL EVERY 6 HOURS PRN
Status: DISCONTINUED | OUTPATIENT
Start: 2023-04-28 | End: 2023-04-29 | Stop reason: HOSPADM

## 2023-04-28 RX ORDER — OXYTOCIN/0.9 % SODIUM CHLORIDE 30/500 ML
1-24 PLASTIC BAG, INJECTION (ML) INTRAVENOUS CONTINUOUS
Status: DISCONTINUED | OUTPATIENT
Start: 2023-04-28 | End: 2023-04-28 | Stop reason: HOSPADM

## 2023-04-28 RX ORDER — OXYTOCIN/0.9 % SODIUM CHLORIDE 30/500 ML
340 PLASTIC BAG, INJECTION (ML) INTRAVENOUS CONTINUOUS PRN
Status: DISCONTINUED | OUTPATIENT
Start: 2023-04-28 | End: 2023-04-29 | Stop reason: HOSPADM

## 2023-04-28 RX ORDER — MISOPROSTOL 200 UG/1
400 TABLET ORAL
Status: DISCONTINUED | OUTPATIENT
Start: 2023-04-28 | End: 2023-04-29 | Stop reason: HOSPADM

## 2023-04-28 RX ORDER — BISACODYL 10 MG
10 SUPPOSITORY, RECTAL RECTAL DAILY PRN
Status: DISCONTINUED | OUTPATIENT
Start: 2023-04-28 | End: 2023-04-29 | Stop reason: HOSPADM

## 2023-04-28 RX ORDER — MODIFIED LANOLIN
OINTMENT (GRAM) TOPICAL
Status: DISCONTINUED | OUTPATIENT
Start: 2023-04-28 | End: 2023-04-29 | Stop reason: HOSPADM

## 2023-04-28 RX ORDER — CARBOPROST TROMETHAMINE 250 UG/ML
250 INJECTION, SOLUTION INTRAMUSCULAR
Status: DISCONTINUED | OUTPATIENT
Start: 2023-04-28 | End: 2023-04-29 | Stop reason: HOSPADM

## 2023-04-28 RX ORDER — DOCUSATE SODIUM 100 MG/1
100 CAPSULE, LIQUID FILLED ORAL DAILY
Status: DISCONTINUED | OUTPATIENT
Start: 2023-04-28 | End: 2023-04-29 | Stop reason: HOSPADM

## 2023-04-28 RX ORDER — LIDOCAINE 40 MG/G
CREAM TOPICAL
Status: DISCONTINUED | OUTPATIENT
Start: 2023-04-28 | End: 2023-04-28 | Stop reason: HOSPADM

## 2023-04-28 RX ORDER — FENTANYL/ROPIVACAINE/NS/PF 2MCG/ML-.1
PLASTIC BAG, INJECTION (ML) EPIDURAL
Status: DISCONTINUED | OUTPATIENT
Start: 2023-04-28 | End: 2023-04-28 | Stop reason: HOSPADM

## 2023-04-28 RX ORDER — SODIUM CHLORIDE, SODIUM LACTATE, POTASSIUM CHLORIDE, CALCIUM CHLORIDE 600; 310; 30; 20 MG/100ML; MG/100ML; MG/100ML; MG/100ML
INJECTION, SOLUTION INTRAVENOUS CONTINUOUS PRN
Status: DISCONTINUED | OUTPATIENT
Start: 2023-04-28 | End: 2023-04-28 | Stop reason: HOSPADM

## 2023-04-28 RX ADMIN — LIDOCAINE HYDROCHLORIDE,EPINEPHRINE BITARTRATE 2 ML: 15; .005 INJECTION, SOLUTION EPIDURAL; INFILTRATION; INTRACAUDAL; PERINEURAL at 09:25

## 2023-04-28 RX ADMIN — SODIUM CHLORIDE, POTASSIUM CHLORIDE, SODIUM LACTATE AND CALCIUM CHLORIDE: 600; 310; 30; 20 INJECTION, SOLUTION INTRAVENOUS at 04:18

## 2023-04-28 RX ADMIN — SODIUM CHLORIDE, POTASSIUM CHLORIDE, SODIUM LACTATE AND CALCIUM CHLORIDE 250 ML: 600; 310; 30; 20 INJECTION, SOLUTION INTRAVENOUS at 10:08

## 2023-04-28 RX ADMIN — Medication 2 MILLI-UNITS/MIN: at 04:18

## 2023-04-28 RX ADMIN — WITCH HAZEL: 500 SOLUTION RECTAL; TOPICAL at 18:07

## 2023-04-28 RX ADMIN — Medication: at 09:44

## 2023-04-28 RX ADMIN — DOCUSATE SODIUM 100 MG: 100 CAPSULE, LIQUID FILLED ORAL at 13:06

## 2023-04-28 RX ADMIN — FENTANYL CITRATE 50 MCG: 50 INJECTION, SOLUTION INTRAMUSCULAR; INTRAVENOUS at 09:02

## 2023-04-28 RX ADMIN — LIDOCAINE HYDROCHLORIDE,EPINEPHRINE BITARTRATE 3 ML: 15; .005 INJECTION, SOLUTION EPIDURAL; INFILTRATION; INTRACAUDAL; PERINEURAL at 09:27

## 2023-04-28 RX ADMIN — KETOROLAC TROMETHAMINE 30 MG: 30 INJECTION, SOLUTION INTRAMUSCULAR; INTRAVENOUS at 13:06

## 2023-04-28 RX ADMIN — LIDOCAINE HYDROCHLORIDE 20 ML: 10 INJECTION, SOLUTION EPIDURAL; INFILTRATION; INTRACAUDAL; PERINEURAL at 11:54

## 2023-04-28 RX ADMIN — BENZOCAINE AND LEVOMENTHOL: 200; 5 SPRAY TOPICAL at 18:07

## 2023-04-28 ASSESSMENT — ACTIVITIES OF DAILY LIVING (ADL)
ADLS_ACUITY_SCORE: 18
ADLS_ACUITY_SCORE: 22
ADLS_ACUITY_SCORE: 18

## 2023-04-28 NOTE — PROGRESS NOTES
"Labor Progress Note  2023 10:22 AM  ________________________________________________________________________    ASSESSMENT & PLAN:   32 year old  at 41w0d.    Induction of labor for post-term. Has epidural. IUPC placed.    Noted to have a mild cough. Check Covid PCR.    Dr. Lindo to continue to manage labor.   ________________________________________________________________________    SUBJECTIVE:  Carlos Paw just got her epidural and is feeling more comfortable. Cervix assessed. Patient consents to IUPC.    OBJECTIVE:  /68   Temp 98  F (36.7  C) (Oral)   Resp 18   Ht 1.549 m (5' 1\")   LMP 2022 (Approximate)   SpO2 99%   Breastfeeding Yes   BMI 32.40 kg/m    FHR: Baseline: 135 bpm, Variability: Moderate (6 - 25 bpm), Accelerations: Absent, Decelerations: variable (with contractions), Trends over time: stable and Category II  Uterine Contractions:  regular, every 2.5 minutes.  Cervix: dilation 7 cm , 85% effaced, soft, and -2 station. Asynclitic.  Fluid: Clear.  Fetal Presentation: vertex.    Recent Results (from the past 24 hour(s))   Hemoglobin    Collection Time: 23  7:21 AM   Result Value Ref Range    Hemoglobin 11.3 (L) 11.7 - 15.7 g/dL   Adult Type and Screen    Collection Time: 23  7:21 AM   Result Value Ref Range    ABO/RH(D) A POS     Antibody Screen Negative Negative    SPECIMEN EXPIRATION DATE [id number]    Extra Green Top (Lithium Heparin) Tube    Collection Time: 23  7:32 AM   Result Value Ref Range    Hold Specimen JIC       Completed by:   Shelby Hernandez MD, M.D.  [facility]  2023 10:22 AM    "

## 2023-04-28 NOTE — ANESTHESIA PROCEDURE NOTES
Epidural catheter Procedure Note    Pre-Procedure   Staff -        Anesthesiologist:  Donavon Child MD       Performed By: anesthesiologist       Location: OB       Procedure Start/Stop Times: 4/28/2023 9:20 AM and 4/28/2023 9:28 AM       Pre-Anesthestic Checklist: patient identified, IV checked, risks and benefits discussed, informed consent, monitors and equipment checked, pre-op evaluation, at physician/surgeon's request and post-op pain management  Timeout:       Correct Patient: Yes        Correct Procedure: Yes        Correct Site: Yes        Correct Position: Yes   Procedure Documentation  Procedure: epidural catheter       Patient Position: sitting       Patient Prep/Sterile Barriers: sterile gloves, mask       Skin prep: Chloraprep       Local skin infiltrated with 3 mL of 1% lidocaine.        Insertion Site: L4-5. (midline approach).       Technique: LORT air        FELIPA at 6 cm.       Needle type: Clacendix.       Needle Gauge: 18.        Needle Length (Inches): 3.5        Catheter: 20 G.          Catheter threaded easily.         4 cm epidural space.         Threaded 10 cm at skin.         # of attempts: 1 and  # of redirects:  0    Assessment/Narrative         Paresthesias: No.       Test dose of 3 mL lidocaine 1.5% w/ 1:200,000 epinephrine at.         Test dose negative, 3 minutes after injection, for signs of intravascular, subdural, or intrathecal injection.       Insertion/Infusion Method: LORT air       No aspiration negative for Heme or CSF via Epidural Catheter.    Medication(s) Administered   Medication Administration Time: 4/28/2023 9:20 AM     Comments:  Single pass epidural. Crisp loss of resistance at 6 cm. Catheter threaded easily 4 cm into the epidural space. Negative aspiration for heme and CSF. Negative test dose. No signs of LAST. No pain or paresthesias upon placement. No complications.    Prior to leaving room, patient reported much improved pain control.        FOR [employer] [facility] ONLY:   Pain Team Contact information: please page the Pain Team Via [facility]. Search \"Pain\". During daytime hours, please page the attending first. At night please page the resident first.      "

## 2023-04-28 NOTE — PROGRESS NOTES
"LABOR PROGRESS NOTE  IUP at 41w0d     SUBJECTIVE:  Cook catheter fell out 1:16am and cervix found to be 6.6/75/-2.  Pain currently mild, but is feeling some contractions and pressure.    OBJECTIVE:  /72   Temp 98.1  F (36.7  C) (Oral)   Resp 18   Ht 1.549 m (5' 1\")   LMP 07/11/2022 (Approximate)   Breastfeeding Yes   BMI 32.40 kg/m      Gen:  Awake and alert, appears mostly comfortable even with contractions  Cervix:    5-6/60/-2, posterior, medium  Fetal status:  Cat 1.    [place]:  Ctx about every 4-6 min    ASSESSMENT:  Labor progressing slowly after very effective cervical ripening with cook catheter (was only 0.5cm dilate when Cook Catheter placed 5 1/2 hours earlier)    PLAN:   Discussed options of observation, pitocin, AROM. Pt agreeable to AROM. AROM performed with clear fluid, but minimal fluid.  Given only small return of fluid, I don't think this will make a big difference in contractions. Pt agreeable to pitocin augmentation.      Tosin Lindo MD 04/28/23 3:49 AM        "

## 2023-04-28 NOTE — L&D DELIVERY NOTE
Delivery Summary  [facility]  Date of Service: 2023    Name      Carlos Ivan         1990  MRN       [id number]  PCP        Tosin Marshall at [facility], [phone].    DELIVERY NARRATIVE    On 2023 Carlos Ivan delivered a viable male infant at 41w0d with apgars of 8 and 9 via Vaginal, Spontaneous Delivery.    Carlos presented to [facility] Care on 2023 for assessment of induction of labor secondary to a post-date pregnancy.  Had declined induction at 39 weeks for hx IUFD.     Her group B Strep (GBS) carrier status was negative. She received Cook Catheter for cervical ripening, which increase cervical dilation from 0.5cm to 5m after about 5 hours.  AROM then performed and IV pitocin augmentation begun.   An IUPC was placed to evaluate contraction strength. She received an epidural for analgesia.    Delivery was via vaginal, spontaneous delivery to a sterile field under intravenous ;epidural  anesthesia. Infant delivered in vertex  left  occiput  anterior position. Shoulders delivered without difficulty. The baby was placed on the patient's abdomen. Cord complications: none . Delayed cord clamping was performed. 3 vessels  were noted.     Placenta delivered at 2023 11:50 AM . Placenta was noted to be intact. Fundal massage performed and fundus found to be firm. The following uterotonics were given: Pitocin (IV). Perineum, vagina, cervix were inspected, and the following lacerations were noted: 2nd degree perineal. Lacerations were repaired in the usual fashion using 3-0 Vicryl. QBL: 200 mL.    Excellent hemostasis was noted. Needle and sponge count correct. Infant and patient in delivery room in good and stable condition.     _________________    GA: 41w0d  GP:   Labor Complications: None   Additional Complications:    QBL:  200  Delivery Type: Vaginal, Spontaneous   Duration of Ruptured Membranes: 8h 07m    Weight: 3.78 kg (8 lb 5.3 oz)   Apgar scores: 8 , 9         Paw, Male-[facility] [[id number]]      Labor Event Times      Active labor onset date: 23 Onset time:  3:00 AM CDT          Labor Events     labor?: No   steroids: None  Labor Type: Induction/Cervical ripening, Augmentation, AROM  Predominate monitoring during 1st stage: continuous electronic fetal monitoring     Antibiotics received during labor?: No     Rupture identifier: Sac 1  Rupture date/time: 23   Rupture type: Artificial Rupture of Membranes  Fluid color: Clear     Induction: Oxytocin, Mechanical ripening agent  Mechanical ripening occurred: In hospital  Induction date/time:      Cervical ripening date/time: 23    Indications for induction: Post-term Gestation     Augmentation: AROM, Oxytocin  Augmentation date/time: 23   Indications for augmentation: Ineffective Contraction Pattern       Delivery/Placenta Date and Time      Delivery Date: 23 Delivery Time: 11:46 AM   Placenta Date/Time: 2023 11:50 AM  Oxytocin given at the time of delivery: after delivery of placenta  Delivering clinician: Tosin Lindo MD   Other personnel present at delivery:  Provider Role   Katie Dallas MD Physician   Joya Lanier RN Registered Nurse   Ary Ernst RN Registered Nurse             Vaginal Counts       Initial count performed by 2 team members:  Two Team Members   belgica lanier         [place] Suture Needles Sponges (RETIRED) Instruments   Initial counts 1 1 5    Added to count       Relief counts       Final counts 1 1 5            Placed during labor Accounted for at the end of labor   FSE NA NA   IUPC Yes Yes   Cervidil NA NA                  Final count performed by 2 team members:  Two Team Members   Belgica lanier      Final count correct?: Yes  Pre-Birth Team Brief: Complete  Post-Birth Team Debrief: Complete       Apgars    Living status: Living   1 Minute 5 Minute 10 Minute  "15 Minute 20 Minute   Skin color: 0  1       Heart rate: 2  2       Reflex irritability: 2  2       Muscle tone: 2  2       Respiratory effort: 2  2       Total: 8  9       Apgars assigned by: JOYA CARBALLO       Cord      Vessels: 3 Vessels    Cord Complications: None     Cord Blood Disposition: Discard      Delayed cord clamping?: Yes      Cord Clamping Delay (seconds):  seconds      Stem cell collection?: No                          Resuscitation    Methods: None  Output in Delivery Room: Stool        Measurements      Weight: 8 lb 5.3 oz Length: 1' 7.69\"     Head circumference: 35 cm    Output in delivery room: Stool       Skin to Skin and Feeding Plan      Skin to skin initiation date/time: 1841    Skin to skin with: Mother  Skin to skin end date/time: 1841           Labor Events and Shoulder Dystocia    Fetal Tracing Prior to Delivery: Category 1  Shoulder dystocia present?: Neg       Delivery (Maternal) (Provider to Complete) ([id number])    Episiotomy: None  Perineal lacerations: 2nd Repaired?: Yes   Est. blood loss (mL): 200  Repair suture: 3-0 Vicryl  Number of repair packets: 1  Genital tract inspection done: Pos       Blood Loss  Mother: Carlos Ivan #[id number]     Start of Mother's Information      Delivery Blood Loss  23 0300 - 23 1222      EBL (mL) Hospital Encounter 200 mL    Total  200 mL               End of Mother's Information  Mother: Carlos Ivan #[id number]                Delivery - Provider to Complete ([id number])    Delivering clinician: Tosin Lindo MD  Delivery Type (Choose the 1 that will go to the Birth History): Vaginal, Spontaneous    Other personnel:  Provider Role   Katie Dallas MD Physician   Joya Carballo, FAMILIA Registered Nurse   Ary Ernst RN Registered Nurse                                   Placenta    Date/Time: 2023 11:50 AM  Removal: Spontaneous  Disposition: Hospital disposal             Anesthesia    Method: INTRAVENOUS , " Epidural  Cervical dilation at placement: 4-7                    Presentation and Position    Presentation: Vertex    Position: Left Occiput Anterior                   The entirety of the delivery and perineal repair was supervised by Dr. Lindo.      Completed by:   Katie Dallas MD  [facility]  Pager: [phone]  4/28/2023 12:19 PM   used: Ernestina

## 2023-04-28 NOTE — PROCEDURES
OBSTETRIC PROCEDURE NOTE:   CERVICAL RIPENING - COOK CATHETER    Carlos Ivan is a 32 year old  at 40w6d who presents for cervical ripening. Today we reviewed the risks, benefits and details of the procedure as well as normal expectations/recommendations. Aura león present.  All questions answered. Informed consent was obtained verbally.     PROCEDURE: Prior to Cook catheter placement, fetal heart tones were Category I.  Cervical exam: /-2 per RN, Srivastava's score: 5. Sterile speculum was placed. The cervix was very difficult to visualize due to collapsing vaginal side walls.  Blind placement attempted with speculum in place, but not successful.  Cervix then checked by me - 0.5/50/-2, posterior, medium.  Attempted to place catheter without speculum; unsuccessful.  Speculum reinserted and catheter successfully placed.  20ml instilled into uterine balloon and did not displaced when pulled back.  20ml then place vaginally, then speculum removed.  Continued to inflate balloons; uterine was 70ml and vaginal 60ml upon my departure.  RN will inflate up to 80ml to uterine balloon if tolerated later.  The patient tolerated the procedure well.     Post-Placement: Fetal heart tones are Cat 1, reactive.  Tocometer shows more frequent contractions.    Plant to keep Cook Catheter in place for 12 hours or until expelled spontaneously, then start IV pitocin.  Ok meds for sleep/comfort overnight if desired.    Tosin Lindo MD   2023 7:52 PM

## 2023-04-28 NOTE — H&P
Maternal Admission H&P  [facility]  Date of Admission: 2023  Date of Service: 2023    Name      Carlos Ivan         1990  MRN       [id number]  PCP        Tosin Lindo at [facility], [phone].    ________________________________________________________________________    Assessment and Plan:  32 year old  at 40w6d.    Induction for hx IUFD and postdates.  Cervix not favorable, will use mechanical ripening overnight (see procedure note) and IV pitocin in the morning.    Baby AGA. Anticipate .    Group B strep: neg    ________________________________________________________________________    Chief Complaint: induction of labor. Indication: hx IUFD, postdates.    HPI: Carlos Ivan is a 32 year old woman at 40w6d, based on an Estimated Date of Delivery: 2023. She presents with plan for induction.    Doing well, no concerns. No changes. Baby moving well.    Pregnancy notable for hx IUFD with previous pregnancy. Had weekly BPP with MFM.  NIPT normal. Last EFW was checked 3/31/23 and showed 7lb7oz, 80th %tile; 3 weeks before showed 40th%tile. Offered induction at 39 weeks, but declined until past due date this week.  Cervix unfavorable in clinic so mechanical ripening planned. Was going to come in yesterday, but hospital staffing not able to accommodate.        Patient Active Problem List    Diagnosis Date Noted     Encounter for triage in pregnant patient 2023     Priority: Medium     Anemia during pregnancy 2023     Priority: Medium     High-risk pregnancy, unspecified trimester 10/24/2022     Priority: Medium     History of stillbirth 2017     Priority: Medium     At 38 weeks gestation she had stillbirth.  On 16       Asthma 2017     Priority: Medium     Per [facility] records, noted on        Betel deposit on teeth 2017     Priority: Medium      OB History    Para Term   AB Living   4 3 3 0 0 2   SAB IAB Ectopic Multiple Live Births   0 0 0 0 2      # Outcome Date GA Lbr Raz/2nd Weight Sex Delivery Anes PTL Lv   4 Current            3 Term 16 38w6d  2.858 kg (6 lb 4.8 oz)  Vag-Spont  N FD      Birth Comments: Preg had been complicated by single umbulical artery and polyhydramnios (THERESE 50).      Apgar1: 0  Apgar5: 0   2 Term 12 41w0d  3 kg (6 lb 9.8 oz) F  None N HENRIK   1 Term 09 40w0d  2.8 kg (6 lb 2.8 oz) F  None N HENRIK     Review of Systems Negative except what is noted in HPI.    Past Medical History:   Diagnosis Date     Implantable subdermal contraceptive surveillance 2019     Uncomplicated asthma       History reviewed. No pertinent surgical history.Patient has no known allergies.  Family History   Problem Relation Age of Onset     Asthma Mother      Other - See Comments Mother         unknown cause of death; was having trouble breathing     Migraines Sister      Migraines Brother      No Known Problems Daughter      Asthma Daughter      Social History     Socioeconomic History     Marital status:      Spouse name: Not on file     Number of children: Not on file     Years of education: Not on file     Highest education level: Not on file   Occupational History     Not on file   Tobacco Use     Smoking status: Never     Passive exposure: Current     Smokeless tobacco: Current     Types: Chew     Tobacco comments:     pt chews bittlenut with tobacco in it for more than 10 years   Vaping Use     Vaping status: Never Used   Substance and Sexual Activity     Alcohol use: Not Currently     Comment: once in a while     Drug use: Never     Sexual activity: Yes     Partners: Male     Birth control/protection: Implant   Other Topics Concern     Not on file   Social History Narrative     Not on file     Social Determinants of Health     Financial Resource Strain: Low Risk  (2023)    Overall Financial Resource Strain (CARDIA)      Difficulty of  Paying Living Expenses: Not very hard   Food Insecurity: No Food Insecurity (1/19/2023)    Hunger Vital Sign      Worried About Running Out of Food in the Last Year: Never true      Ran Out of Food in the Last Year: Never true   Transportation Needs: No Transportation Needs (1/19/2023)    PRAPARE - Transportation      Lack of Transportation (Medical): No      Lack of Transportation (Non-Medical): No   Physical Activity: Insufficiently Active (1/19/2023)    Exercise Vital Sign      Days of Exercise per Week: 3 days      Minutes of Exercise per Session: 30 min   Stress: No Stress Concern Present (1/19/2023)    [employer] - Occupational Stress Questionnaire      Feeling of Stress : Only a little   Social Connections: Not on file   Intimate Partner Violence: Not on file   Housing Stability: Not on file     Prior to Admission Medication List  Medications Prior to Admission   Medication Sig Dispense Refill Last Dose     polyethylene glycol (MIRALAX) 17 GM/Dose powder Take 17 g (1 capful) by mouth daily 507 g 11 More than a month     Prenatal Vit-Fe Fumarate-FA (PNV FOLIC ACID + IRON) 27-1 MG TABS Take 1 tablet by mouth daily 90 tablet 3 4/26/2023      Allergies  No Known Allergies  Immunization History   Administered Date(s) Administered     COVID-19 Vaccine 18+ (Moderna) 04/18/2021, 05/16/2021     HPV 07/07/2015, 09/01/2016     HPV9 11/30/2017     Hepatitis A (ADULT 19+) 11/30/2017     Hepatitis A Immunity: Titer/MD Dx 02/06/2015     Hepatitis B Immunity: Titer 10/28/2015     Hepatitis B, Adult 11/30/2017     Influenza Vaccine >6 months (Alfuria,Fluzone) 02/20/2015, 09/04/2015, 09/01/2016, 09/13/2022     Influenza Vaccine, 6+MO IM (QUADRIVALENT W/PRESERVATIVES) 09/04/2015, 09/01/2016, 10/18/2017     MMR 02/20/2015, 04/03/2015     Mantoux Tuberculin Skin Test 03/22/2017     TD,PF 7+ (Tenivac) 04/03/2015, 12/10/2015     TDAP (Adacel,Boostrix) 03/23/2016, 02/10/2023     TDAP Vaccine (Boostrix)  "02/20/2015     Varicella Immunity: Titer/MD Dx 02/06/2015      Physical Exam  Patient Vitals for the past 24 hrs:   Temp Temp src Resp Height   04/27/23 1557 98.3  F (36.8  C) Oral 16 1.549 m (5' 1\")     Wt Readings from Last 1 Encounters:   04/25/23 77.8 kg (171 lb 8 oz)   Prepregnancy: 68.9 kg (152 lb), BMI 28.74. Total gain: 8.845 kg (19 lb 8 oz) (expected gain: 7 kg (15 lb)-11.5 kg (25 lb)).    Gen:  Awake and alert, appears well and in no distress.  CV: Normal color/no cyanosis  Resp: Normal respiratory effort, no grossly audible wheezing  Skin: No rash of visible skin  Ext: trace edema  CERVIX: 0.5/50/-2, posterior, medium    Fetal Heart Tones: Cat 1  CONTRACTIONS:  Irregular, q6-10 min    FLUID: None noted.  Fetal Presentation: vertex.  Labs    No visits with results within 1 Day(s) from this visit.   Latest known visit with results is:   Prenatal Office Visit on 03/22/2023   Component Date Value Ref Range Status     Group B Strep PCR 03/22/2023 Negative  Negative Final    Presumed negative for Streptococcus agalactiae (Group B Streptococcus) or the number of organisms may be below the limit of detection of the assay.      Group B Strep PCR   Date Value Ref Range Status   03/22/2023 Negative Negative Final     Comment:     Presumed negative for Streptococcus agalactiae (Group B Streptococcus) or the number of organisms may be below the limit of detection of the assay.      Antibody Screen   Date Value Ref Range Status   01/17/2023 Negative Negative Final     Hepatitis B Surface Antigen   Date Value Ref Range Status   10/14/2022 Nonreactive Nonreactive Final     Hemoglobin   Date Value Ref Range Status   01/31/2023 11.0 (L) 11.7 - 15.7 g/dL Final        Completed by:   Tosin Lindo MD  [facility]  4/27/2023 7:42 PM   used: Ksenia.   "

## 2023-04-28 NOTE — PLAN OF CARE
"  Problem: Labor  Goal: Stable Fetal Wellbeing  Outcome: Progressing  Goal: Effective Progression to Delivery  Outcome: Progressing       Patient working towards a       Pain controlled with nothing for now      Category 1 strip       Infusion:LR     at    50ml/hr and Pitocin at 6 u      /77   Temp 98.1  F (36.7  C) (Oral)   Resp 18   Ht 1.549 m (5' 1\")   LMP 2022 (Approximate)   Breastfeeding Yes   BMI 32.40 kg/m      "

## 2023-04-28 NOTE — PLAN OF CARE
Goal Outcome Evaluation:       Pt has been stable post delivery. She had an epidural and voided at 1140 am before delivery. She was up to the bathroom and attempted to void. Care plans to have pt attempt again in an hour or two and reevaluate for possible straight catheter. Pt had toradol for pain. She has been breastfeeding her baby and also requested bottle feeding.

## 2023-04-28 NOTE — PROGRESS NOTES
Provider called with update on contractions and SVE.     Provider to bedside to offer augmentation to patient using  services. Patient ok with AROM and pitocin.     AROM with clear fluid

## 2023-04-28 NOTE — PROGRESS NOTES
Cook Catheter inserted at 1930. 70cc in uterine balloon, 60cc instilled in vaginal balloon. Pt having some pressure.     Per Dr. Belgica maurice to instill additional 10cc into uterine balloon later if pressure sensation decreases for pt/pt tolerating. Additional 20cc may also be inserted into vaginal balloon.     NO more than 80cc in each balloon.

## 2023-04-28 NOTE — PROGRESS NOTES
"LABOR PROGRESS NOTE  IUP at 41w0d     SUBJECTIVE:  Having more pain and pressure.     OBJECTIVE:  /77   Temp 98.1  F (36.7  C) (Oral)   Resp 18   Ht 1.549 m (5' 1\")   LMP 07/11/2022 (Approximate)   Breastfeeding Yes   BMI 32.40 kg/m      Gen:  Lightly breathing through contractions  Cervix:    7/70/-2, medium, midposition  EFM: cat 1  Peever: q2    ASSESSMENT:  Labor progressing slowly with augmentation after mechanical ripening.    PLAN:   continue pitocin, try more position changes      Tosin Lindo MD 04/28/23 5:52 AM        "

## 2023-04-28 NOTE — ANESTHESIA PREPROCEDURE EVALUATION
Anesthesia Pre-Procedure Evaluation    Patient: Carlos Ivan   MRN: [id number] : 1990        Procedure : * No procedures listed *          Past Medical History:   Diagnosis Date     Implantable subdermal contraceptive surveillance 2019     Uncomplicated asthma       History reviewed. No pertinent surgical history.   No Known Allergies   Social History     Tobacco Use     Smoking status: Never     Passive exposure: Current     Smokeless tobacco: Current     Types: Chew     Tobacco comments:     pt chews bittlenut with tobacco in it for more than 10 years   Vaping Use     Vaping status: Never Used   Substance Use Topics     Alcohol use: Not Currently     Comment: once in a while      Wt Readings from Last 1 Encounters:   23 77.8 kg (171 lb 8 oz)        Anesthesia Evaluation   Pt has had prior anesthetic.     No history of anesthetic complications       ROS/MED HX  ENT/Pulmonary:     (+) asthma     Neurologic:  - neg neurologic ROS     Cardiovascular:       METS/Exercise Tolerance:     Hematologic:  - neg hematologic  ROS     Musculoskeletal:       GI/Hepatic:  - neg GI/hepatic ROS     Renal/Genitourinary:       Endo:     (+) Obesity,     Psychiatric/Substance Use:  - neg psychiatric ROS     Infectious Disease:       Malignancy:       Other:            Physical Exam    Airway        Mallampati: III   TM distance: > 3 FB   Neck ROM: full   Mouth opening: > 3 cm    Respiratory Devices and Support         Dental  no notable dental history         Cardiovascular             Pulmonary   pulmonary exam normal                OUTSIDE LABS:  CBC:   Lab Results   Component Value Date    WBC 15.5 (H) 2023    WBC 7.0 10/14/2022    HGB 11.3 (L) 2023    HGB 11.0 (L) 2023    HCT 34.5 (L) 2023    HCT 36.3 10/14/2022     2023     10/14/2022     BMP:   Lab Results   Component Value Date     2021    POTASSIUM 4.6 2021    CHLORIDE 111 (H) 2021    CO2 21  (L) 02/08/2021    BUN 19 02/08/2021    CR 0.72 02/08/2021     02/08/2021     COAGS: No results found for: PTT, INR, FIBR  POC:   Lab Results   Component Value Date    HCG Positive (A) 09/13/2022     HEPATIC:   Lab Results   Component Value Date    ALBUMIN 3.6 01/31/2023    PROTTOTAL 6.4 01/31/2023    ALT 17 01/31/2023    AST 14 01/31/2023    ALKPHOS 52 01/31/2023    BILITOTAL 0.2 01/31/2023     OTHER:   Lab Results   Component Value Date    NAGA 9.1 02/08/2021       Anesthesia Plan    ASA Status:  2      Anesthesia Type: Epidural.              Consents    Anesthesia Plan(s) and associated risks, benefits, and realistic alternatives discussed. Questions answered and patient/representative(s) expressed understanding.    - Discussed:     - Discussed with:  Patient         Postoperative Care            Comments:    Other Comments: Consent for epidural obtained with an      Patient requests labor epidural.  Chart reviewed, including labs.  I reviewed the options and risks with the patient, including but not limited to: bleeding, infection, damage to tissues under the skin (nerves, muscles, bloodvessels), hypotension, headache, and epidural failure.  The patient's questions were answered and the consent was signed. The patient agrees to elective epidural placement.         neg OB ROS.       DARRIN GONZALEZ MD

## 2023-04-28 NOTE — PROGRESS NOTES
Patient called RN to bedside to show that the cook balloon had fallen out while using the bathroom.    both balloons intact.     Patient agreed to an SVE and was 5.5/75%/-2.     Dr. Lindo notified of above along with contraction pattern, EFM, and pain.     Let patient continue to labor on own, call back if contractions slow down

## 2023-04-29 VITALS
HEIGHT: 61 IN | TEMPERATURE: 98.1 F | SYSTOLIC BLOOD PRESSURE: 126 MMHG | OXYGEN SATURATION: 98 % | HEART RATE: 80 BPM | RESPIRATION RATE: 18 BRPM | BODY MASS INDEX: 32.4 KG/M2 | DIASTOLIC BLOOD PRESSURE: 79 MMHG

## 2023-04-29 PROCEDURE — 250N000013 HC RX MED GY IP 250 OP 250 PS 637

## 2023-04-29 RX ORDER — AMOXICILLIN 250 MG
1 CAPSULE ORAL DAILY PRN
Qty: 50 TABLET | Refills: 1 | Status: SHIPPED | OUTPATIENT
Start: 2023-04-29 | End: 2024-02-12

## 2023-04-29 RX ORDER — PRENATAL VIT/IRON FUM/FOLIC AC 27MG-0.8MG
1 TABLET ORAL DAILY
Qty: 100 TABLET | Refills: 0 | Status: SHIPPED | OUTPATIENT
Start: 2023-04-29 | End: 2023-08-07

## 2023-04-29 RX ORDER — IBUPROFEN 600 MG/1
600 TABLET, FILM COATED ORAL EVERY 8 HOURS PRN
Qty: 50 TABLET | Refills: 1 | Status: SHIPPED | OUTPATIENT
Start: 2023-04-29

## 2023-04-29 RX ADMIN — DOCUSATE SODIUM 100 MG: 100 CAPSULE, LIQUID FILLED ORAL at 08:20

## 2023-04-29 ASSESSMENT — ACTIVITIES OF DAILY LIVING (ADL)
ADLS_ACUITY_SCORE: 18

## 2023-04-29 NOTE — DISCHARGE INSTRUCTIONS
[facility] to come to your home 5/1/23. They will call you with a time. [phone]    Postpartum Vaginal Delivery Instructions    Activity     Ask family and friends for help when you need it.  Do not place anything in your vagina for 6 weeks.  You are not restricted on other activities, but take it easy for a few weeks to allow your body to recover from delivery.  You are able to do any activities you feel up to that point.  No driving until you have stopped taking your pain medications (usually two weeks after delivery).     Call your health care provider if you have any of these symptoms:     Increased pain, swelling, redness, or fluid around your stiches from an episiotomy or perineal tear.  A fever above 100.4 F (38 C) with or without chills when placing a thermometer under your tongue.  You soak a sanitary pad with blood within 1 hour, or you see blood clots larger than a golf ball.  Bleeding that lasts more than 6 weeks.  Vaginal discharge that smells bad.  Severe pain, cramping or tenderness in your lower belly area.  A need to urinate more frequently (use the toilet more often), more urgently (use the toilet very quickly), or it burns when you urinate.  Nausea and vomiting.  Redness, swelling or pain around a vein in your leg.  Problems breastfeeding or a red or painful area on your breast.  Chest pain and cough or are gasping for air.  Problems coping with sadness, anxiety, or depression.  If you have any concerns about hurting yourself or the baby, call your provider immediately.   You have questions or concerns after you return home.     Keep your hands clean:  Always wash your hands before touching your perineal area and stitches.  This helps reduce your risk of infection.  If your hands aren't dirty, you may use an alcohol hand-rub to clean your hands. Keep your nails clean and short.    Warning Signs after Having a Baby    Keep this paper on your fridge or somewhere else where you can see  it.    Call your provider if you have any of these symptoms up to 12 weeks after having your baby.    Thoughts of hurting yourself or your baby  Pain in your chest or trouble breathing  Severe headache not helped by pain medicine  Eyesight concerns (blurry vision, seeing spots or flashes of light, other changes to eyesight)  Fainting, shaking or other signs of a seizure    Call 9-1-1 if you feel that it is an emergency.     The symptoms below can happen to anyone after giving birth. They can be very serious. Call your provider if you have any of these warning signs.    My provider s phone number: _______________________    Losing too much blood (hemorrhage)    Call your provider if you soak through a pad in less than an hour or pass blood clots bigger than a golf ball. These may be signs that you are bleeding too much.    Blood clots in the legs or lungs    After you give birth, your body naturally clots its blood to help prevent blood loss. Sometimes this increased clotting can happen in other areas of the body, like the legs or lungs. This can block your blood flow and be very dangerous.     Call your provider if you:  Have a red, swollen spot on the back of your leg that is warm or painful when you touch it.   Are coughing up blood.     Infection    Call your provider if you have any of these symptoms:  Fever of 100.4 F (38 C) or higher.  Pain or redness around your stitches if you had an incision.   Any yellow, white, or green fluid coming from places where you had stitches or surgery.    Mood Problems (postpartum depression)    Many people feel sad or have mood changes after having a baby. But for some people, these mood swings are worse.     Call your provider right away if you feel so anxious or nervous that you can't care for yourself or your baby.    Preeclampsia (high blood pressure)    Even if you didn't have high blood pressure when you were pregnant, you are at risk for the high blood pressure disease  called preeclampsia. This risk can last up to 12 weeks after giving birth.     Call your provider if you have:   Pain on your right side under your rib cage  Sudden swelling in the hands and face    Remember: You know your body. If something doesn't feel right, get medical help.     For informational purposes only. Not to replace the advice of your health care provider. Copyright 2020 [facility]. All rights reserved. Clinically reviewed by Noemi Paez RNC-OB, MSN. Ribbit [id number] - Rev 02/23.

## 2023-04-29 NOTE — PLAN OF CARE
"Problem: Plan of Care - These are the overarching goals to be used throughout the patient stay.    Goal: Optimal Comfort and Wellbeing  Outcome: Progressing  Intervention: Provide Person-Centered Care  Recent Flowsheet Documentation  Taken 4/28/2023 1812 by Chey Muñoz, RN  Trust Relationship/Rapport:   care explained   choices provided   questions answered   questions encouraged   thoughts/feelings acknowledged    Patient is independent in room. Family and significant other at the bedside. Voiding spontaneously without difficulty this shift. Denies pain. Fundus firm, bleeding scant. VSS.    /70 (BP Location: Right arm, Patient Position: Semi-Dow's, Cuff Size: Adult Regular)   Pulse 80   Temp 98.1  F (36.7  C) (Oral)   Resp 16   Ht 1.549 m (5' 1\")   LMP 07/11/2022 (Approximate)   SpO2 99%   Breastfeeding Yes   BMI 32.40 kg/m      Chey Muñoz RN on 4/28/2023 at 9:12 PM     "

## 2023-04-29 NOTE — PROGRESS NOTES
RN faxed Home care orders for both Mom & baby for referral per Dr. Hernandez for 5/1/23    Monisha Bob RN

## 2023-04-29 NOTE — PLAN OF CARE
Problem: Postpartum (Vaginal Delivery)  Goal: Successful Maternal Role Transition  Outcome: Progressing   Goal Outcome Evaluation:  Pt bonding well with , formula feeding per request. Attentive to all needs.      Problem: Postpartum (Vaginal Delivery)  Goal: Optimal Pain Control and Function  Outcome: Progressing        Pt denies pain or the need for pain meds.    Mood/Rop/BC gone over with Aura baker.    Monisha Bob RN

## 2023-04-29 NOTE — PLAN OF CARE
[facility]'s vitals and maternal assessments WDL. Up independently in room caring for self and infant. Denies pain. Breast and formula feeding infant, patient did not breastfeed overnight. [facility] is attentive to infant needs, asking appropriate questions and appear to be bonding well.    Problem: Plan of Care - These are the overarching goals to be used throughout the patient stay.    Goal: Optimal Comfort and Wellbeing  Outcome: Progressing  Intervention: Provide Person-Centered Care  Recent Flowsheet Documentation  Taken 4/29/2023 0158 by Jia Godinez, RN  Trust Relationship/Rapport:    care explained    choices provided    empathic listening provided    questions encouraged    questions answered    thoughts/feelings acknowledged     Problem: Postpartum (Vaginal Delivery)  Goal: Successful Maternal Role Transition  Outcome: Progressing     Problem: Postpartum (Vaginal Delivery)  Goal: Optimal Pain Control and Function  Outcome: Progressing   Goal Outcome Evaluation:      Plan of Care Reviewed With: patient    Overall Patient Progress: improvingOverall Patient Progress: improving

## 2023-04-29 NOTE — ANESTHESIA POSTPROCEDURE EVALUATION
Patient: Carlos Ivan    Procedure: * No procedures listed *       Anesthesia Type:  Epidural    Note:  Disposition: Admission   Postop Pain Control: Uneventful            Sign Out: Well controlled pain   PONV: No   Neuro/Psych: Uneventful            Sign Out: Acceptable/Baseline neuro status   Airway/Respiratory: Uneventful            Sign Out: Acceptable/Baseline resp. status   CV/Hemodynamics: Uneventful            Sign Out: Acceptable CV status; No obvious hypovolemia; No obvious fluid overload   Other NRE: NONE   DID A NON-ROUTINE EVENT OCCUR? No    Event details/Postop Comments:  Patient resting. RN reports patient without issues or concerns. Encouraged RN to be in touch with Anesthesiologist should any questions/concerns arise. RN agreed.              Last vitals:  Vitals:    04/28/23 2207 04/29/23 0158 04/29/23 0600   BP: 94/61 100/69 103/67   Pulse: 82 70 63   Resp: 18 18 18   Temp: 36.7  C (98.1  F) 36.6  C (97.9  F) 36.6  C (97.9  F)   SpO2: 99% 99% 100%       Electronically Signed By: Ary Farrell MD  April 29, 2023  9:59 AM

## 2023-04-29 NOTE — PROGRESS NOTES
All discharge education completed including review of danger signs. Parents verbalize understanding and deny having additional questions. Follow up is planned for May 2.      Monisha Bob RN

## 2023-04-29 NOTE — PROGRESS NOTES
At discharge pt reports she is co-sleeping with baby at home. When RN went over safe sleep practices & risks, pt reported not having a bassinet/crib yet. Reached out to Outreach RN, offered Bundles of Love Diapers/etc but not seeing any pack n plays in the dept. Provided education to pt & family. HC RN to follow Monday.    Monisha Bbo, RN

## 2023-04-29 NOTE — DISCHARGE SUMMARY
Maternal Discharge Summary  [facility]  Date of Service: 2023    Name      Carlos Ivan         1990  MRN       [id number]  PCP        Tosin Marshall at [facility], [phone].  ________________________________________________________________________    Assessment:  Carlos Ivan is a 32 year old now  s/p vaginal, spontaneous  at 41w0d on 2023 .    Discharge Plan:   1. Discharge to Home. Condition at Discharge:  stable.  2. Physical activity: Regular.  3. Diet:  Regular.  4. Home care nurse: ordered for 1-2 days from now.  5. Lactation clinic appointment: not indicated.  6. Contraception plan: Nexplanon.  7. Follow up with Tosin Marshall in 4 days and 6 weeks.  Future Appointments   Date Time Provider Department Center   2023  9:30 AM Faustina, [facility]   2023 11:40 AM Tosin Lindo MD [facility]   2023  9:20 AM Tosin Lindo MD [facility]      ________________________________________________________________________    Admission Date:  2023  Delivery Date:  2023  11:46 AM    Discharge Date:  2023    Delivery: Vaginal, Spontaneous  at 41w0d    Principal Problem:    Post term pregnancy, 41 weeks  Active Problems:    History of stillbirth    High-risk pregnancy, unspecified trimester    Supervision of high risk pregnancy, unspecified, third trimester    Subjective:  Patient denies headache, dizziness, dyspnea, and leg pain. Ambulating and eating.    Discharge Exam:  Patient Vitals for the past 24 hrs:   BP Temp Temp src Pulse Resp SpO2   23 0600 103/67 97.9  F (36.6  C) Oral 63 18 100 %   23 0158 100/69 97.9  F (36.6  C) Oral 70 18 99 %   23 2207 94/61 98.1  F (36.7  C) Oral 82 18 99 %   23 1812 107/70 98.1  F (36.7  C) Oral 80 16 99 %   23 1400 123/70 -- -- -- 16 --   23 1345 (!) 140/75 -- -- -- 16 --   23 1330 110/62 -- --  -- 16 --   04/28/23 1315 107/63 -- -- -- 16 --   04/28/23 1300 104/58 -- -- -- 16 --   04/28/23 1245 106/62 97.8  F (36.6  C) Oral -- 16 --   04/28/23 1230 115/65 -- -- -- 16 --   04/28/23 1215 (!) 153/91 -- -- -- 16 --   04/28/23 1159 134/70 -- -- -- -- --   04/28/23 1152 134/72 -- -- -- -- --   04/28/23 1119 126/78 -- -- -- -- 99 %   04/28/23 1030 100/55 -- -- -- -- --   04/28/23 1029 -- -- -- -- -- 100 %   04/28/23 1014 99/60 -- -- -- -- 100 %   04/28/23 1010 112/76 -- -- -- -- --   04/28/23 1004 110/68 -- -- -- -- 99 %   04/28/23 1000 111/65 98  F (36.7  C) Oral -- -- 100 %   04/28/23 0955 106/61 -- -- -- -- 100 %   04/28/23 0952 108/57 -- -- -- -- --   04/28/23 0950 109/55 -- -- -- -- 100 %   04/28/23 0945 108/61 -- -- -- -- 100 %   04/28/23 0944 104/62 -- -- -- -- --     General - alert, comfortable  Heart - RRR, no murmurs  Lungs - CTA bilaterally  Abdomen - fundus firm, nontender, below umbilicus  Extremities - trace edema  Admission on 04/27/2023   Component Date Value Ref Range Status     Treponema Antibody Total 04/28/2023 Nonreactive  Nonreactive Final     Hemoglobin 04/28/2023 11.3 (L)  11.7 - 15.7 g/dL Final     ABO/RH(D) 04/28/2023 A POS   Final     Antibody Screen 04/28/2023 Negative  Negative Final     SPECIMEN EXPIRATION DATE 04/28/2023 [id number]   Final     Hold Specimen 04/28/2023 [facility]   Final     SARS CoV2 PCR 04/28/2023 Negative  Negative Final    NEGATIVE: SARS-CoV-2 (COVID-19) RNA not detected, presumed negative.      Discharge Medications: See medication reconciliation.     Completed by:   Shelby Hernandez MD  [facility]  4/29/2023 9:43 AM   used: Ernestina

## 2023-05-01 ENCOUNTER — MEDICAL CORRESPONDENCE (OUTPATIENT)
Dept: HEALTH INFORMATION MANAGEMENT | Facility: CLINIC | Age: 33
End: 2023-05-01
Payer: MEDICAID

## 2023-05-02 ENCOUNTER — PRENATAL OFFICE VISIT (OUTPATIENT)
Dept: FAMILY MEDICINE | Facility: CLINIC | Age: 33
End: 2023-05-02
Payer: COMMERCIAL

## 2023-05-02 ENCOUNTER — TELEPHONE (OUTPATIENT)
Dept: FAMILY MEDICINE | Facility: CLINIC | Age: 33
End: 2023-05-02

## 2023-05-02 VITALS
RESPIRATION RATE: 20 BRPM | OXYGEN SATURATION: 99 % | HEART RATE: 68 BPM | HEIGHT: 61 IN | WEIGHT: 164.25 LBS | BODY MASS INDEX: 31.01 KG/M2 | DIASTOLIC BLOOD PRESSURE: 80 MMHG | SYSTOLIC BLOOD PRESSURE: 110 MMHG | TEMPERATURE: 98.6 F

## 2023-05-02 DIAGNOSIS — R10.2 SUPRAPUBIC PAIN: ICD-10-CM

## 2023-05-02 PROBLEM — O48.0 POST TERM PREGNANCY, 41 WEEKS: Status: RESOLVED | Noted: 2023-04-27 | Resolved: 2023-05-02

## 2023-05-02 PROBLEM — O09.90 HIGH-RISK PREGNANCY, UNSPECIFIED TRIMESTER: Status: RESOLVED | Noted: 2022-10-24 | Resolved: 2023-05-02

## 2023-05-02 PROBLEM — Z3A.41 POST TERM PREGNANCY, 41 WEEKS: Status: RESOLVED | Noted: 2023-04-27 | Resolved: 2023-05-02

## 2023-05-02 PROBLEM — O09.93 SUPERVISION OF HIGH RISK PREGNANCY, UNSPECIFIED, THIRD TRIMESTER: Status: RESOLVED | Noted: 2023-04-27 | Resolved: 2023-05-02

## 2023-05-02 PROCEDURE — 99024 POSTOP FOLLOW-UP VISIT: CPT | Mod: 25 | Performed by: FAMILY MEDICINE

## 2023-05-02 ASSESSMENT — ASTHMA QUESTIONNAIRES
QUESTION_1 LAST FOUR WEEKS HOW MUCH OF THE TIME DID YOUR ASTHMA KEEP YOU FROM GETTING AS MUCH DONE AT WORK, SCHOOL OR AT HOME: SOME OF THE TIME
ACT_TOTALSCORE: 22
QUESTION_2 LAST FOUR WEEKS HOW OFTEN HAVE YOU HAD SHORTNESS OF BREATH: NOT AT ALL
QUESTION_5 LAST FOUR WEEKS HOW WOULD YOU RATE YOUR ASTHMA CONTROL: WELL CONTROLLED
QUESTION_4 LAST FOUR WEEKS HOW OFTEN HAVE YOU USED YOUR RESCUE INHALER OR NEBULIZER MEDICATION (SUCH AS ALBUTEROL): NOT AT ALL
QUESTION_3 LAST FOUR WEEKS HOW OFTEN DID YOUR ASTHMA SYMPTOMS (WHEEZING, COUGHING, SHORTNESS OF BREATH, CHEST TIGHTNESS OR PAIN) WAKE YOU UP AT NIGHT OR EARLIER THAN USUAL IN THE MORNING: NOT AT ALL
ACT_TOTALSCORE: 22

## 2023-05-02 NOTE — PROGRESS NOTES
1 Week Postpartum Check  [facility]  Date of Service: 5/2/2023      Assessment/Plan:     Postpartum exam  Anemia:  Normal antepartum hemoglobin with no excessive blood loss  Breastfeeding/Bottle feeding:  Pt is breast and formula feeding baby  Contraception:   Plan Nexplanon placement next visit  Depression:   Denies  Exercise:   Encouraged increasing exercise based on how she is feeling.  Healthcare maintenance:   Up to date      Lactating mother  - Breast Pump Order    Suprapubic pain  Patient just has discomfort when changing positions/for standing.  I do not think this is likely to be serious etiology such as endometritis, urinary tract infection, etc. given paucity of other symptoms.  Would watch for now, return sooner than next appointment if worsens.         Follow-up at about 6 weeks postpartum:  Future Appointments   Date Time Provider Department Center   6/6/2023  9:20 AM Tosin Lindo MD [facility]           Subjective:      Carlos Ivan is a 32 year old female who presents for a postpartum visit.   She is 4 days postpartum following a spontaneous vaginal delivery.   I have fully reviewed the prenatal and intrapartum course.   Delivery notes below  Postpartum course has been Unremarkable.  Baby's course has been Uncomplicated.  Periods:  Still bleeding Patient's last menstrual period was 07/11/2022 (approximate).   Bowel or bladder concerns: none  Patient has not resumed intercourse.        Some lower abd pain when sitting more and then gets uncomfortable.  Gets better after up awhile.  Bleeding is pretty light/spotting.  No cramps. Not ill.    Recent hemoglobin results    Hemoglobin   Date Value Ref Range Status   04/28/2023 11.3 (L) 11.7 - 15.7 g/dL Final   01/31/2023 11.0 (L) 11.7 - 15.7 g/dL Final   01/17/2023 10.7 (L) 11.7 - 15.7 g/dL Final   10/14/2022 12.0 11.7 - 15.7 g/dL Final         The following portions of the patient's history were reviewed and updated as  "appropriate: allergies, current medications and problem list     OB History    Para Term  AB Living   4 4 4 0 0 3   SAB IAB Ectopic Multiple Live Births   0 0 0 0 3      # Outcome Date GA Lbr Raz/2nd Weight Sex Delivery Anes PTL Lv   4 Term 23 41w0d 08:20 / 00:26 3.78 kg (8 lb 5.3 oz) M Vag-Spont IV, EPI N HENRIK      Name: Braulio Gillette      Apgar1: 8  Apgar5: 9   3 Term 16 38w6d  2.858 kg (6 lb 4.8 oz)  Vag-Spont  N FD      Birth Comments: Preg had been complicated by single umbulical artery and polyhydramnios (THERESE 50).      Name: Evonne Gonzalez      Apgar1: 0  Apgar5: 0   2 Term 12 41w0d  3 kg (6 lb 9.8 oz) F  None N HENRIK      Name: Dawit Gonzalez   1 Term 09 40w0d  2.8 kg (6 lb 2.8 oz) F  None N HENRIK      Name:  Gustavo Diop Carlos     Information for the patient's :  Braulio Bassett [[id number]]   Braulio Cadenah Nain       Objective:        Wt Readings from Last 3 Encounters:   23 74.5 kg (164 lb 4 oz)   23 77.8 kg (171 lb 8 oz)   23 78 kg (172 lb)     /80 (BP Location: Left arm, Patient Position: Sitting, Cuff Size: Adult Regular)   Pulse 68   Temp 98.6  F (37  C) (Oral)   Resp 20   Ht 1.549 m (5' 1\")   Wt 74.5 kg (164 lb 4 oz)   LMP 2022 (Approximate)   SpO2 99%   Breastfeeding Yes   BMI 31.03 kg/m          Physical Exam:  General Appearance: Alert, cooperative, no distress, appears stated age  Breasts: breasts appear mame  Heart: Regular rate and rhythm, S1 and S2 normal, no murmur, rub, or gallop  Abdomen: Soft, non-tender, no masses, no organomegaly  Extremities: Extremities normal, atraumatic, no cyanosis or edema  Skin: Skin color, texture, turgor normal, no rashes or lesions  Lymph nodes: Cervical and axillary nodes normal  Neurologic: Normal        "

## 2023-05-02 NOTE — TELEPHONE ENCOUNTER
Patient had her baby 4/29/23.  I saw her today and she asked for Clinic Care Coordination to please call her about some bills.    (I'm not sure which team member would address this, so forwarding to Ld THEODOER and Jia ECHEVARRIA)

## 2023-05-02 NOTE — TELEPHONE ENCOUNTER
Romulo Ro,    What is the status of health insurance, can I let her know to wait until insurance is active and can you add new born as well?      Thanks,  -Ld.

## 2023-05-04 ENCOUNTER — PATIENT OUTREACH (OUTPATIENT)
Dept: CARE COORDINATION | Facility: CLINIC | Age: 33
End: 2023-05-04
Payer: MEDICAID

## 2023-05-04 NOTE — PROGRESS NOTES
Clinic Care Coordination Contact  Program: Health Insurance/[facility] Benefits   [facility]:  [facility] Case #:  [facility] Worker:   Nancy #:   Subscriber #:   Renewal:  Date Applied:     FRW Outreach:  5/4/23: FRW and pt called [facility] and confirmed patient has UCARE and we added son to family case. Family will get insurance card in 3 weeks and insurance will be active at date of birth for son. FRW will send an email to billing to reprocess bills once we have sons ID number. As far as [facility] benefits (snap/cash) [facility] is still processing and they are 3 weeks out.  4/18/23: FRW called pt. Pt received paperwork from [facility] to complete and she sent back a couple weeks ago. FRW will call [facility] for a status.  4/4/23: FRW called pt. Pt applied for [facility] benefits the first week in February. FRW explained the process times. FRW and pt will connect in 2 more weeks.   3/31/23: FRW called pt at scheduled time to complete [facility] benefit application. Online portal for SNAP/CASH is down. FRW and pt will apply on 4/4.  3/17/23: FRW called pt and we called [facility] and spoke with Gume. Gume took down income information and her insurance will be active in 2 weeks. FRW and pt will apply for [facility] benefits on 3/31.  3/2/23: I called pt and scheduled appointment to apply for health insurance and [facility] benefits on 3/17.    Health Insurance:      Referral/Screening:  Financial Resource Worker Screening  [facility] Benefits  Is patient requesting help applying for [facility] benefits?: Yes  Have you recently applied for any [facility] benefits?: No  How many people in your household?: 4  Do you buy/eat food together?: Yes  What is the monthly gross income for the household (wages, social security, workers comp, and pension)? : 3600     [place]:  [place] No  Is this an insurance renewal?: No  Is this a new insurance application request?: Yes  Have you recently applied for insurance?: No  How many people in your  household? : 4  Do you file taxes?: Yes  How many dependents do you claim?: 2  What is the monthly gross income for the household (wages, social security, workers comp, and pension)? : 3600      Care Coordination team will tell patient:   Thank you for answering all the questions, based on screening questions, our Financial Resource Worker will reach out to you with additional questions and next steps.

## 2023-05-26 ENCOUNTER — PATIENT OUTREACH (OUTPATIENT)
Dept: CARE COORDINATION | Facility: CLINIC | Age: 33
End: 2023-05-26
Payer: MEDICAID

## 2023-05-26 NOTE — PROGRESS NOTES
Clinic Care Coordination Contact  Care Coordination Clinician Chart Review    Situation: Patient chart reviewed by Care Coordinator.       Background: Care Coordination Program started: 10/24/2022. Initial assessment completed and patient-centered care plan(s) were developed with participation from patient. Lead CC handed patient off to CHW for continued outreaches.       Assessment: Per chart review, patient outreach completed by CC CHW on 4/25/2023.  Patient is actively working to accomplish goal(s). Patient's goal(s) appropriate and relevant at this time. Patient is not due for updated Plan of Care.  Assessments will be completed annually or as needed/with change of patient status.      Care Plan: Health Insurance     Problem: Medicaid     Goal: Patient would like to explore if she could qualify for [place] and [facility] SNAP in the next 6 months.     Start Date: 12/2/2022 Expected End Date: 6/2/2023    This Visit's Progress: 50% Recent Progress: 40%    Note:     Barriers: language barrier  Strengths: Accepting of support  Patient expressed understanding of goal: Yes     Action steps to achieve this goal:   1.  I will continue to answer my phone when FRW calls.   2.  I will provide all necessary documentation and information to complete a [place] application.   3.  I will return the call and update CCC Team regarding this goal                              Plan/Recommendations: The patient will continue working with Care Coordination to achieve goal(s) as above. CHW will continue outreaches at minimum every 30 days and will involve Lead CC as needed or if patient is ready to move to Maintenance. Lead CC will continue to monitor CHW outreaches and patient's progress to goal(s) every 6 weeks.     Plan of Care updated and sent to patient: No            
DISPLAY PLAN FREE TEXT

## 2023-05-31 ENCOUNTER — PATIENT OUTREACH (OUTPATIENT)
Dept: CARE COORDINATION | Facility: CLINIC | Age: 33
End: 2023-05-31
Payer: MEDICAID

## 2023-05-31 NOTE — PROGRESS NOTES
Clinic Care Coordination Contact  Patient has completed all goals with Clinic Care Coordination.  Please review the chart and confirm if maintenance  is approved.    -Patient and new born's health insurance are active, patient received ID card and pending for new born.   -Patient received new born's birth certificate and Social Security Card.   -Patient is receiving [facility] visit and [facility] SNAP of $568 per month and spouse continues working full time.   -Per patient agreed, CHW assisted with preventive care visit in September 2023.   -Patient has no immediate financial barrier and no additional resource needed at this time.  -Patient was informed to call sooner with questions or concerns.   -CCC RN to further review chart and advise.

## 2023-05-31 NOTE — PROGRESS NOTES
Clinic Care Coordination Contact  Program: Health Insurance/County Benefits   [facility]:  [facility] Case #:  [facility] Worker:   Nnacy #:   Subscriber #:   Renewal:  Date Applied:     FRW Outreach:  5/31/23: FRW called pt. Patient and son now have [employer] [place]. Insurance is added in Epic and bills are reprocessed. Patient was approved SNAP $568 per month. Goals complete.  5/4/23: FRW and pt called [facility] and confirmed patient has UCARE and we added son to family case. Family will get insurance card in 3 weeks and insurance will be active at date of birth for son. FRW will send an email to billing to reprocess bills once we have sons ID number. As far as [facility] benefits (snap/cash) [facility] is still processing and they are 3 weeks out.  4/18/23: FRW called pt. Pt received paperwork from [facility] to complete and she sent back a couple weeks ago. FRW will call [facility] for a status.  4/4/23: FRW called pt. Pt applied for [facility] benefits the first week in February. FRW explained the process times. FRW and pt will connect in 2 more weeks.   3/31/23: FRW called pt at scheduled time to complete [facility] benefit application. Online portal for SNAP/CASH is down. FRW and pt will apply on 4/4.  3/17/23: FRW called pt and we called [facility] and spoke with Gume. Gume took down income information and her insurance will be active in 2 weeks. FRW and pt will apply for [facility] benefits on 3/31.  3/2/23: I called pt and scheduled appointment to apply for health insurance and [facility] benefits on 3/17.    Health Insurance:      Referral/Screening:  Financial Resource Worker Screening  [facility] Benefits  Is patient requesting help applying for [facility] benefits?: Yes  Have you recently applied for any [facility] benefits?: No  How many people in your household?: 4  Do you buy/eat food together?: Yes  What is the monthly gross income for the household (wages, social security, workers comp, and pension)? : 3600     Ins[place]:  [place]?: No  Is this an insurance renewal?: No  Is this a new insurance application request?: Yes  Have you recently applied for insurance?: No  How many people in your household? : 4  Do you file taxes?: Yes  How many dependents do you claim?: 2  What is the monthly gross income for the household (wages, social security, workers comp, and pension)? : 3600      Care Coordination team will tell patient:   Thank you for answering all the questions, based on screening questions, our Financial Resource Worker will reach out to you with additional questions and next steps.

## 2023-06-04 NOTE — TELEPHONE ENCOUNTER
FYI: I addendum my outreach on 5/26 transitioned patient to maintenance. Per chart review, patient still has some outstanding balance. Please check outstanding balance at your next outreach. Thank.

## 2023-06-06 ENCOUNTER — PRENATAL OFFICE VISIT (OUTPATIENT)
Dept: FAMILY MEDICINE | Facility: CLINIC | Age: 33
End: 2023-06-06
Payer: COMMERCIAL

## 2023-06-06 VITALS
RESPIRATION RATE: 20 BRPM | WEIGHT: 157.5 LBS | OXYGEN SATURATION: 96 % | SYSTOLIC BLOOD PRESSURE: 124 MMHG | BODY MASS INDEX: 29.74 KG/M2 | HEART RATE: 83 BPM | DIASTOLIC BLOOD PRESSURE: 70 MMHG | TEMPERATURE: 98.1 F | HEIGHT: 61 IN

## 2023-06-06 DIAGNOSIS — Z30.017 INSERTION OF IMPLANTABLE SUBDERMAL CONTRACEPTIVE: ICD-10-CM

## 2023-06-06 DIAGNOSIS — L21.0 DANDRUFF IN ADULT: ICD-10-CM

## 2023-06-06 DIAGNOSIS — O99.019 ANEMIA DURING PREGNANCY: ICD-10-CM

## 2023-06-06 DIAGNOSIS — J30.2 SEASONAL ALLERGIC RHINITIS, UNSPECIFIED TRIGGER: ICD-10-CM

## 2023-06-06 PROCEDURE — 99213 OFFICE O/P EST LOW 20 MIN: CPT | Mod: 25 | Performed by: FAMILY MEDICINE

## 2023-06-06 PROCEDURE — 11981 INSERTION DRUG DLVR IMPLANT: CPT | Performed by: FAMILY MEDICINE

## 2023-06-06 PROCEDURE — 99207 PR POST PARTUM EXAM: CPT | Performed by: FAMILY MEDICINE

## 2023-06-06 RX ORDER — SALICYLIC ACID 0.03 G/ML
SHAMPOO TOPICAL EVERY OTHER DAY
Qty: 473 ML | Refills: 11 | Status: SHIPPED | OUTPATIENT
Start: 2023-06-06 | End: 2024-02-12

## 2023-06-06 RX ORDER — FLUTICASONE PROPIONATE 50 MCG
1 SPRAY, SUSPENSION (ML) NASAL DAILY
Qty: 18.2 ML | Refills: 11 | Status: SHIPPED | OUTPATIENT
Start: 2023-06-06 | End: 2024-02-12

## 2023-06-06 ASSESSMENT — EDINBURGH POSTNATAL DEPRESSION SCALE (EPDS)
I HAVE BLAMED MYSELF UNNECESSARILY WHEN THINGS WENT WRONG: NO, NEVER
THINGS HAVE BEEN GETTING ON TOP OF ME: NO, I HAVE BEEN COPING AS WELL AS EVER
I HAVE FELT SCARED OR PANICKY FOR NO GOOD REASON: NO, NOT AT ALL
I HAVE FELT SAD OR MISERABLE: NO, NOT AT ALL
I HAVE LOOKED FORWARD WITH ENJOYMENT TO THINGS: AS MUCH AS I EVER DID
I HAVE BEEN ANXIOUS OR WORRIED FOR NO GOOD REASON: NO, NOT AT ALL
THE THOUGHT OF HARMING MYSELF HAS OCCURRED TO ME: NEVER
I HAVE BEEN ABLE TO LAUGH AND SEE THE FUNNY SIDE OF THINGS: AS MUCH AS I ALWAYS COULD
I HAVE FELT SAD OR MISERABLE: NO, NOT AT ALL
I HAVE BEEN SO UNHAPPY THAT I HAVE BEEN CRYING: NO, NEVER
I HAVE BLAMED MYSELF UNNECESSARILY WHEN THINGS WENT WRONG: NO, NEVER
I HAVE BEEN ABLE TO LAUGH AND SEE THE FUNNY SIDE OF THINGS: AS MUCH AS I ALWAYS COULD
I HAVE BEEN ANXIOUS OR WORRIED FOR NO GOOD REASON: NO, NOT AT ALL
THINGS HAVE BEEN GETTING ON TOP OF ME: NO, I HAVE BEEN COPING AS WELL AS EVER
I HAVE BEEN SO UNHAPPY THAT I HAVE HAD DIFFICULTY SLEEPING: NOT AT ALL
I HAVE BEEN SO UNHAPPY THAT I HAVE BEEN CRYING: NO, NEVER
I HAVE BEEN SO UNHAPPY THAT I HAVE HAD DIFFICULTY SLEEPING: NOT AT ALL
TOTAL SCORE: 0
THE THOUGHT OF HARMING MYSELF HAS OCCURRED TO ME: NEVER
I HAVE FELT SCARED OR PANICKY FOR NO GOOD REASON: NO, NOT AT ALL
I HAVE LOOKED FORWARD WITH ENJOYMENT TO THINGS: AS MUCH AS I EVER DID

## 2023-06-06 NOTE — PATIENT INSTRUCTIONS
HEALTH MATTERS  Your Six-week Post-partum Check-up  A Health Care Guide for New Mothers  (from [employer] www.arhp.org/healthmatters)    You have spent nine months preparing for your baby s birth. You ve probably read every book, article, and Web site to make sure you re eating right, exercising at the appropriate level, and taking the necessary vitamins and supplements. Your effort has paid off CONGRATULATIONS.    After giving birth, it s important for you to keep up the healthy habits you practiced while you were pregnant. Your health care provider is your best resource for making sure you re on track.     DIET, NUTRITION, & EXERCISE GOALS  Weight Loss  Returning to pre-pregnancy  weight is a common goal.  Combining a healthy diet with  exercise will help you lose  weight safely after delivery.  Because dieting after pregnancy  can decrease bone mineral  density, it s important to get  enough calcium and do  weightbearing activities.   GOAL: Lose weight gradually-- 4.5 lbs/month maximum after first month post-delivery (unless you had a high prepregnancy weight):  Be patient  Consume at least 1,800 calories per day (an additional 500 calories per day is recommended if you re breastfeeding).  Drink plenty of fluids (moderate caffeine intake, such as 1 cup of coffee per day, and occasional alcohol consumption are okay).   Nutrition  A well-balanced diet is essential  for women before, during, and  after pregnancy. Most  multivitamins and prenatal  vitamins don t supply enough  calcium. Also, breastfeeding  mothers transfer 250-350 mg of  calcium to their baby through  breast milk when they re nursing.  Vitamin and mineral supplements  can help ensure that you get the  nutrients you need.   GOAL: 1,000 mg of calcium daily for adult women  (1,300 mg for adolescents):  Eat foods such as low-fat and fat-free dairy products and leafy vegetables (e.g., broccoli, kale, and  edith).  If food choices don t supply the recommended calcium, take a calcium supplement (e.g., Calcium Soft Chews, Caltrate , Oc-Vinayak , Tums , or Viactiv  with meals. Note: 400-800 IU of vitamin D helps your body absorb calcium).  GOAL: daily: 15 mg of iron daily  Eat foods such as fortified cereals, lean beef, dried fruits, tofu, oysters, and spinach.  If the time between pregnancies is short, talk to your health care provider to see if you should take an iron supplement as well.     Exercise  Regular physical activity after  delivery should be a part of every  new mother s daily life. A gradual  return to exercise is recommended.  Some women may be able to  start exercising within days of  delivery; others may need to wait  four to six weeks. Talk to your  health care provider about what  exercise schedule and level are  right for you. GOAL: Strengthen the pelvic floor and abdominal muscles; reduce the risk of urinary stress incontinence (urine leakage):  Do Kegel exercises: Contract the pelvic muscles for 10 seconds and then relax them for 10 seconds. Do this for 15 minutes, four times/day.  GOAL: Keep bones strong; tone and shape your body:  Do weight-bearing exercises, (e.g., walking or cycyling), which help maintain strong, dense bones.  If you re nursing, breastfeed before exercising to minimize breast discomfort.   Physical Exam  Don t be embarrassed to discuss  with your health care provider  all aspects of your physical  health including important  conditions that may result from  Delivery. GOAL: Thorough post post-delivery health exam:  Talk to your health care provider about:    - Breast condition and breastfeeding    - Constipation    - Hemorrhoids    - Vaginal discharge    - Urinary incontinence (leakage)    - Healing below the birth canal    - Varicose veins    - Weight loss    - Exercise   Emotional Adjustment  Many women have emotional  changes after delivery. Let your  health care provider know  if  you ve been feeling  overwhelmed, anxious, sad,  isolated, nervous, obsessive,  incompetent, exhausted, or you  can t sleep. Your health care  provider can help you feel and  cope better.   GOAL: Good health and well well-being:  Take time for yourself.  Get enough rest.  Call on family and friends for help.  Consider joining a mothers  or postpartum support group  Call [employer] at 280-950-9NXI or visit online at www.postpartum.net.  Delay going back to work for at least 6 weeks after delivery.  Ask your health care provider about:    - Mood swings and  baby blues     - Symptoms of postpartum depression    - Ways to prevent depression    - Planning for hormonal shifts (e.g., when you re weaning your baby or your period starts again)     Sexuality and Contraception  Lack of interest in sex is common  after childbirth and for the first  couple of months afterwards.  Most women experience a  gradual return to pre-pregnancy  levels of sexual desire,  enjoyment, and frequency within  a year of giving birth, but every  woman has her own timetable.  The return to fertility is  unpredictable. You may be able  to get pregnant before your  periods return, even when you re  breastfeeding. For most women  who aren t nursing, ovulation  occurs about 45 days  postpartum, but it can be earlier.  Discuss family planning with your  health care provider.   PHYSICAL, EMOTIONAL AND  SEXUAL NEEDS GOALS  GOAL: Healthy sexuality:   Keep an open dialogue with your partner about your readiness to make love.   Make time for cuddling and kissing to re-establish physical closeness.   Ask your health care provider about:    - When to resume sexual intercourse    - How to minimize discomfort    - Effects of breastfeeding or hormones on sexual desire  GOAL: Post-delivery contraception:   Think about whether or not you d like to have more children.   Before you resume sexual activity, ask your health care provider  about:    - Contraceptive options during breastfeeding and afterward    - The benefits and risks of all suitable methods

## 2023-06-06 NOTE — PROGRESS NOTES
6 Week Postpartum Check  [facility]  Date of Service: 6/6/2023      Assessment/Plan:     Postpartum exam  Doing well  Still taking PNV  Breastfeeding    Insertion of implantable subdermal contraceptive  See procedure note below  - etonogestrel (NEXPLANON) 68 MG IMPL  - etonogestrel (NEXPLANON) subdermal implant 68 mg  - INSERTION NON-BIODEGRADABLE DRUG DELIVERY IMPLANT  - HCG qualitative urine    Seasonal allergic rhinitis, unspecified trigger  Pt had over a month of runny nose, itchy nose/eyes, cough.  Most likely allergies. Will start with flonase, could potentially add antihistamine but she prefers to avoid in case that could reduce breastmilk supply, which she is already struggling with some    - fluticasone (FLONASE) 50 MCG/ACT nasal spray  Dispense: 18.2 mL; Refill: 11    Dandruff in adult  - Salicylic Acid (SELSUN BLUE DEEP CLEANSING) 3 % SHAM  Dispense: 473 mL; Refill: 11    Lactating mother  Discussed supporting    Anemia during pregnancy  Reports postpartum hemoglobin soon after delivery was a little bit low at [facility] pump, but she has been taking prenatal vitamin since and is not interested in rechecking hemoglobin today.     Subjective:      Carlos Ivan is a 32 year old female who presents for a postpartum visit.   She is about 5-6 weeks postpartum following a spontaneous vaginal delivery.   I have fully reviewed the prenatal and intrapartum course.   Delivery notes below  Postpartum course has been Unremarkable.  Baby's course has been Uncomplicated.  Periods:  None Patient's last menstrual period was 07/11/2022 (approximate).   Bowel or bladder concerns: none  Patient has not resumed intercourse.          Recent hemoglobin results    Hemoglobin   Date Value Ref Range Status   04/28/2023 11.3 (L) 11.7 - 15.7 g/dL Final   01/31/2023 11.0 (L) 11.7 - 15.7 g/dL Final   01/17/2023 10.7 (L) 11.7 - 15.7 g/dL Final   10/14/2022 12.0 11.7 - 15.7 g/dL Final         The following portions of the  "patient's history were reviewed and updated as appropriate: allergies, current medications and problem list     OB History    Para Term  AB Living   4 4 4 0 0 3   SAB IAB Ectopic Multiple Live Births   0 0 0 0 3      # Outcome Date GA Lbr Raz/2nd Weight Sex Delivery Anes PTL Lv   4 Term 23 41w0d 08:20 / 00:26 3.78 kg (8 lb 5.3 oz) M Vag-Spont IV, EPI N HENRIK      Name: Braulio Gillette      Apgar1: 8  Apgar5: 9   3 Term 16 38w6d  2.858 kg (6 lb 4.8 oz)  Vag-Spont  N FD      Birth Comments: Preg had been complicated by single umbulical artery and polyhydramnios (THERESE 50).      Name: Evonne Gonzalez      Apgar1: 0  Apgar5: 0   2 Term 12 41w0d  3 kg (6 lb 9.8 oz) F  None N HENRIK      Name: Dawit Gonzalez   1 Term 09 40w0d  2.8 kg (6 lb 2.8 oz) F  None N HENRIK      Name: Dawit Gonzalez     Information for the patient's :  Braulio Bassett [[id number]]   Braulio Gillette       Objective:        Wt Readings from Last 3 Encounters:   23 71.4 kg (157 lb 8 oz)   23 74.5 kg (164 lb 4 oz)   23 77.8 kg (171 lb 8 oz)     /70 (BP Location: Left arm, Patient Position: Sitting, Cuff Size: Adult Regular)   Pulse 83   Temp 98.1  F (36.7  C) (Oral)   Resp 20   Ht 1.549 m (5' 1\")   Wt 71.4 kg (157 lb 8 oz)   LMP 2022 (Approximate)   SpO2 96%   Breastfeeding Yes   BMI 29.76 kg/m          Physical Exam:  General Appearance: Alert, cooperative, no distress, appears stated age  HEENT: Scalp is nonerythematous with small amount of fine scale present.    ------------  NEXPLANON INSERTION NOTE    PRE-OP DIAGNOSIS: desired long-term, reversible contraception     POST-OP DIAGNOSIS: Same     PROCEDURE: Nexplanon   placement     Performing Physician: Tosin Lindo MD     PROCEDURE: Nexplanon Contraceptive Implant insertion    That patient was counseled in risks and benefits and alternatives to the procedure.  She gave verbal and written consent.    Site:  left " Arm    Sterile Preparation:Betadine    Insertion site was selected 8 - 10 cm from medial epicondyle (used old Nexplanon insertion/removal scar)   Procedure area was prepped and draped in a sterile fashion   Nexplanon   trocar was inserted subcutaneously and then Nexplanon   capsule delivered subcutaneously   Trocar was removed from the insertion site.   Nexplanon   capsule was palpated by provider and patient to assure satisfactory placement.     Estimated blood loss of less than 1 mL     Dressings applied: Bandaid, pressure dressing    Followup: The patient tolerated the procedure well without complications. Standard post-procedure care is explained and return precautions are given.

## 2023-07-27 ENCOUNTER — PATIENT OUTREACH (OUTPATIENT)
Dept: CARE COORDINATION | Facility: CLINIC | Age: 33
End: 2023-07-27
Payer: COMMERCIAL

## 2023-07-27 NOTE — PROGRESS NOTES
Clinic Care Coordination Contact  Patient has completed all goals with Clinic Care Coordination.  Please review the chart and confirm if graduation is approved.    -Patient has no new concerns at this time, no new goal establish or address on today follow up.   -Patient is receiving [facility] SNAP and WIC for new born.   -Patient still has outstanding bill balance of $78.58, CHW contacted billing department and spoke with Vielka YEPEZ and due patient unable to pay current outstanding balance and also previous balance of $1001.36 that went to collection. Vielka YEPEZ will submit [facility] request to override those balances. Patient to disregard all bill statements that receive and patient was informed to call back if further assistance needed.   -CCC RN to further review chart and advise.

## 2023-07-31 ENCOUNTER — PATIENT OUTREACH (OUTPATIENT)
Dept: CARE COORDINATION | Facility: CLINIC | Age: 33
End: 2023-07-31
Payer: COMMERCIAL

## 2023-07-31 NOTE — LETTER
[facility] CARE COORDINATION    July 31, 2023    Carlos Ivan  [address]  [facility] [address]    Dear [facility],  Your Care Team congratulates you on your journey to maintain wellness. This document will help guide you on your journey to maintain a healthy lifestyle.  You can use this to help you overcome any barriers you may encounter.  If you should have any questions or concerns, you can contact the members of your Care Team or contact your Primary Care Clinic for assistance.     Health Maintenance  Health Maintenance Reviewed:      My Access Plan  Medical Emergency 911   Primary Clinic Line [facility] - [phone]   24 Hour Appointment Line [phone] or  [phone] ([phone]) (toll-free)   24 Hour Nurse Line [phone] (toll-free)   Preferred Urgent Care     Preferred Hospital     Preferred Pharmacy [facility] - [place], [place] - [address] Behavioral Health Crisis Line The National Suicide Prevention Lifeline at 1-980.715.1781 or 911     My Care Team Members  Patient Care Team         Relationship Specialty Notifications Start End    Tosin Lindo MD PCP - General Family Medicine  12/17/22     Phone: [phone] Fax: [phone]         [address] [place] [address]    Tosin Lindo MD Assigned PCP   9/24/22     Phone: [phone] Fax: [phone]         [address] [facility] [address] [place] [address]    Ld Re[place] Community Health Worker Primary Care - CC Admissions 10/24/22     Phone: [phone] Fax: [phone]         [address] [place] [id number]    Janna Delgadillo, RN Lead Care Coordinator Primary Care - [facility] Admissions 10/25/22     Ivy Lauren MD Assigned OBGYN Provider   6/17/23                  Goals    None              It has been your Clinic Care Team's pleasure to work with you on your goals.    Regards,  Your Clinic Care Team

## 2023-07-31 NOTE — PROGRESS NOTES
Clinic Care Coordination Contact  Care Coordination Clinician Chart Review    Situation: Patient chart reviewed by Care Coordinator.       Background: Care Coordination Program started: 10/24/2022. Initial assessment completed and patient-centered care plan(s) were developed with participation from patient. Lead CC handed patient off to CHW for continued outreaches.       Assessment: Per chart review, patient outreach completed by CC CHW on 2023.  Patient is actively working to accomplish goal(s). Patient's goal(s) appropriate and relevant at this time. Patient is not due for updated Plan of Care.  Assessments will be completed annually or as needed/with change of patient status. CHW outreached to patient on 2023 and reported of no new concerns. Patient was initially referral to CCC by PCP for pregnancy resources. Patient delivered a son on 2023 and FRW assisted patient adding  under mom's insurance. [place] now has active insurance. No ED visits or hospitalization the past 3 months. No new goals to work on.       Plan/Recommendations: Patient has been graduated from [facility] today after completed all goals.

## 2023-10-07 ENCOUNTER — HEALTH MAINTENANCE LETTER (OUTPATIENT)
Age: 33
End: 2023-10-07

## 2023-11-22 ENCOUNTER — MEDICAL CORRESPONDENCE (OUTPATIENT)
Dept: HEALTH INFORMATION MANAGEMENT | Facility: CLINIC | Age: 33
End: 2023-11-22

## 2023-11-22 ENCOUNTER — IMMUNIZATION (OUTPATIENT)
Dept: FAMILY MEDICINE | Facility: CLINIC | Age: 33
End: 2023-11-22
Payer: COMMERCIAL

## 2023-11-22 PROCEDURE — 90686 IIV4 VACC NO PRSV 0.5 ML IM: CPT

## 2023-11-22 PROCEDURE — 90471 IMMUNIZATION ADMIN: CPT

## 2024-02-12 ENCOUNTER — OFFICE VISIT (OUTPATIENT)
Dept: FAMILY MEDICINE | Facility: CLINIC | Age: 34
End: 2024-02-12
Payer: COMMERCIAL

## 2024-02-12 ENCOUNTER — ANCILLARY PROCEDURE (OUTPATIENT)
Dept: GENERAL RADIOLOGY | Facility: CLINIC | Age: 34
End: 2024-02-12
Attending: FAMILY MEDICINE
Payer: COMMERCIAL

## 2024-02-12 VITALS
RESPIRATION RATE: 24 BRPM | SYSTOLIC BLOOD PRESSURE: 123 MMHG | WEIGHT: 159 LBS | HEART RATE: 86 BPM | TEMPERATURE: 98.8 F | BODY MASS INDEX: 30.02 KG/M2 | OXYGEN SATURATION: 98 % | HEIGHT: 61 IN | DIASTOLIC BLOOD PRESSURE: 81 MMHG

## 2024-02-12 DIAGNOSIS — R05.2 SUBACUTE COUGH: ICD-10-CM

## 2024-02-12 DIAGNOSIS — J30.2 SEASONAL ALLERGIC RHINITIS, UNSPECIFIED TRIGGER: ICD-10-CM

## 2024-02-12 DIAGNOSIS — G43.009 MIGRAINE WITHOUT AURA AND WITHOUT STATUS MIGRAINOSUS, NOT INTRACTABLE: ICD-10-CM

## 2024-02-12 DIAGNOSIS — J45.30 MILD PERSISTENT ASTHMA WITHOUT COMPLICATION: Primary | ICD-10-CM

## 2024-02-12 PROCEDURE — 99214 OFFICE O/P EST MOD 30 MIN: CPT | Performed by: FAMILY MEDICINE

## 2024-02-12 PROCEDURE — 71046 X-RAY EXAM CHEST 2 VIEWS: CPT | Mod: TC | Performed by: RADIOLOGY

## 2024-02-12 RX ORDER — FLUTICASONE PROPIONATE 50 MCG
2 SPRAY, SUSPENSION (ML) NASAL DAILY
Qty: 18.2 ML | Refills: 11 | Status: SHIPPED | OUTPATIENT
Start: 2024-02-12 | End: 2024-07-31

## 2024-02-12 RX ORDER — PROPRANOLOL HYDROCHLORIDE 10 MG/1
10 TABLET ORAL 3 TIMES DAILY
Qty: 90 TABLET | Refills: 1 | Status: SHIPPED | OUTPATIENT
Start: 2024-02-12 | End: 2024-07-31

## 2024-02-12 RX ORDER — FLUTICASONE PROPIONATE 110 UG/1
1 AEROSOL, METERED RESPIRATORY (INHALATION) 2 TIMES DAILY
Qty: 12 G | Refills: 11 | Status: SHIPPED | OUTPATIENT
Start: 2024-02-12 | End: 2024-07-31

## 2024-02-12 RX ORDER — ALBUTEROL SULFATE 90 UG/1
2 AEROSOL, METERED RESPIRATORY (INHALATION) EVERY 4 HOURS PRN
Qty: 18 G | Refills: 1 | Status: SHIPPED | OUTPATIENT
Start: 2024-02-12

## 2024-02-12 ASSESSMENT — ASTHMA QUESTIONNAIRES
ACT_TOTALSCORE: 24
ACT_TOTALSCORE: 24
QUESTION_2 LAST FOUR WEEKS HOW OFTEN HAVE YOU HAD SHORTNESS OF BREATH: NOT AT ALL
QUESTION_4 LAST FOUR WEEKS HOW OFTEN HAVE YOU USED YOUR RESCUE INHALER OR NEBULIZER MEDICATION (SUCH AS ALBUTEROL): NOT AT ALL
QUESTION_3 LAST FOUR WEEKS HOW OFTEN DID YOUR ASTHMA SYMPTOMS (WHEEZING, COUGHING, SHORTNESS OF BREATH, CHEST TIGHTNESS OR PAIN) WAKE YOU UP AT NIGHT OR EARLIER THAN USUAL IN THE MORNING: ONCE OR TWICE
QUESTION_5 LAST FOUR WEEKS HOW WOULD YOU RATE YOUR ASTHMA CONTROL: COMPLETELY CONTROLLED
QUESTION_1 LAST FOUR WEEKS HOW MUCH OF THE TIME DID YOUR ASTHMA KEEP YOU FROM GETTING AS MUCH DONE AT WORK, SCHOOL OR AT HOME: NONE OF THE TIME

## 2024-02-12 ASSESSMENT — ENCOUNTER SYMPTOMS
COUGH: 1
HEADACHES: 1

## 2024-02-12 NOTE — PROGRESS NOTES
"  Assessment & Plan     Subacute cough  Suspect asthma-related. Normal exam, other than cough. CXR today before leaving, will call w results.   - XR Chest 2 Views    Migraine without aura and without status migrainosus, not intractable  Chronic issue for years, but has not been previously treated per patient. 10/month, would benefit from preventive treatment. Breastfeeding (fairly minimal - 10 mo old gets 18 oz formula/day), and we discussed options are very limited in breastfeeding. Propranolol is felt to be compatible, advised to watch for any changes in baby, and will start with very low dose. Also advised to watch for worsening asthma symptoms (again, options limited, so will use cautiously and monitor for worsening asthma; likely mild intermittent asthma based on history). Follow up in 3-4 weeks, sooner if problems or side effects  - propranolol (INDERAL) 10 MG tablet  Dispense: 90 tablet; Refill: 1    Seasonal allergic rhinitis, unspecified trigger  Likely more affected this winter with warm weather. Restart flonase.  - fluticasone (FLONASE) 50 MCG/ACT nasal spray  Dispense: 18.2 mL; Refill: 11    Mild persistent asthma without complication  Suspect mild intermittent at baseline, but possibly a current flare from untreated allergies and viral season. Recommend flonase to calm any persistent inflammation in airways, and follow up in 3-4 weeks, sooner if needed. Albuteorl rx as needed. Discussed differences in these two inhalers, how to use, what they are for.   - fluticasone (FLOVENT HFA) 110 MCG/ACT inhaler  Dispense: 12 g; Refill: 11    Declines covid vaccine            BMI  Estimated body mass index is 30.37 kg/m  as calculated from the following:    Height as of this encounter: 1.541 m (5' 0.67\").    Weight as of this encounter: 72.1 kg (159 lb).             Enedelia Gonzalez is a 33 year old, presenting for the following health issues:  Cough (Sometimes wheezing occur once in a while) and Headache (Only " "left side of head hurt when unable to sleeps well)      2/12/2024     8:24 AM   Additional Questions   Roomed by danis graff MA   Accompanied by self       Cough x 2-3 months, clear/yellow sputum at times, no hemoptysis. Sometimes \"wheezing\" , lasts 2-3 days and goes away. History of asthma, but no symptoms for a few years per patient. Previously used inhaler but ran out in 2019 (no insurance then) and has not had an inhaler since. She recalls having PFTs or spirometry, possibly 2015 or 2016. No known diagnosis of TB or TB exposure. No fevers, chills, or night sweats.     Previous flonase nasal spray, not currently taking but willing to restart. Reports a diagnosis of spring seasonal allergies.     Reports a history of \"migraines\" for 10 years, but states she has not been seen for this before. Occur frequently - she estimates 10 times/month. No increased frequency or changes in symptoms recently - symptoms are chronic for years and unchanged. Headaches can last for days, occur on one side or other. Stabbing pain throughout one side. Photophobia with headaches, nausea.  No aura. No vision changes. No numbness of face, arm, or leg. Has never been on medication for these headaches. Uses ibuprofen at home, helps reduce pain but does not completely go away.     Nexplanon in place, but interested in possibly one more child, but not for 2-3 years. Breast feeding her 10 month old 3-4 times/day, mainly for comfort (also formula 6oz 3x/day).                   Objective    /81   Pulse 86   Temp 98.8  F (37.1  C) (Oral)   Resp 24   Ht 1.541 m (5' 0.67\")   Wt 72.1 kg (159 lb)   SpO2 98%   BMI 30.37 kg/m    Body mass index is 30.37 kg/m .  Physical Exam   GENERAL: alert and no distress  EYES: Eyes grossly normal to inspection, PERRL and conjunctivae and sclerae normal  HENT: ear canals normal and TM's dull, nose and mouth without ulcers or lesions, betel nut staining  NECK: no adenopathy, no asymmetry, masses, or " scars  RESP: lungs clear to auscultation - no rales, rhonchi or wheezes  CV: regular rate and rhythm, normal S1 S2, no S3 or S4, no murmur, click or rub, no peripheral edema  MS: no gross musculoskeletal defects noted, no edema  Neuro: alert, oriented, grossly nonfocal            Signed Electronically by: Monisha Holcomb MD

## 2024-02-14 ENCOUNTER — TELEPHONE (OUTPATIENT)
Dept: FAMILY MEDICINE | Facility: CLINIC | Age: 34
End: 2024-02-14
Payer: COMMERCIAL

## 2024-02-14 NOTE — TELEPHONE ENCOUNTER
Discussed results with patient + . She verbalized understanding of results and inhaler administration. Will wait to start propanolol until cough has subsided. Plans to follow up as scheduled on 3/4.    Isadora Fong RN SABAS  [facility]      ----- Message from Monisha Holcomb MD sent at 2/14/2024 11:02 AM CST -----  Please call w results -   Chest xray overall looks ok - it shows that there is some inflammation from asthma, but no pneumonia or infection. It is important to start the TWICE DAILY inhaler I prescribed (the one called flovent, usually comes in an orange/red colored canister) and continue twice daily EVEN when feeling well and not coughing. This medication will treat the inflammation. I also recommend she WAIT to start the headache medicine (propranolol) until we get her cough controlled. Follow up as scheduled on 3/4, but sooner if any worsening.

## 2024-03-04 ENCOUNTER — OFFICE VISIT (OUTPATIENT)
Dept: FAMILY MEDICINE | Facility: CLINIC | Age: 34
End: 2024-03-04
Payer: COMMERCIAL

## 2024-03-04 VITALS
SYSTOLIC BLOOD PRESSURE: 99 MMHG | HEIGHT: 60 IN | RESPIRATION RATE: 24 BRPM | OXYGEN SATURATION: 100 % | TEMPERATURE: 98.2 F | BODY MASS INDEX: 31.62 KG/M2 | HEART RATE: 74 BPM | DIASTOLIC BLOOD PRESSURE: 71 MMHG | WEIGHT: 161.04 LBS

## 2024-03-04 DIAGNOSIS — R73.9 HYPERGLYCEMIA: ICD-10-CM

## 2024-03-04 DIAGNOSIS — R74.02 NONSPECIFIC ELEVATION OF LEVELS OF TRANSAMINASE OR LACTIC ACID DEHYDROGENASE (LDH): ICD-10-CM

## 2024-03-04 DIAGNOSIS — J45.30 MILD PERSISTENT ASTHMA WITHOUT COMPLICATION: Primary | ICD-10-CM

## 2024-03-04 DIAGNOSIS — L65.9 HAIR LOSS: ICD-10-CM

## 2024-03-04 DIAGNOSIS — R74.01 NONSPECIFIC ELEVATION OF LEVELS OF TRANSAMINASE OR LACTIC ACID DEHYDROGENASE (LDH): ICD-10-CM

## 2024-03-04 DIAGNOSIS — E11.9 TYPE 2 DIABETES MELLITUS WITHOUT COMPLICATION, WITHOUT LONG-TERM CURRENT USE OF INSULIN (H): ICD-10-CM

## 2024-03-04 DIAGNOSIS — G43.009 MIGRAINE WITHOUT AURA AND WITHOUT STATUS MIGRAINOSUS, NOT INTRACTABLE: ICD-10-CM

## 2024-03-04 DIAGNOSIS — L29.9 SCALP ITCH: ICD-10-CM

## 2024-03-04 LAB
ALBUMIN SERPL BCG-MCNC: 4.6 G/DL (ref 3.5–5.2)
ALP SERPL-CCNC: 104 U/L (ref 40–150)
ALT SERPL W P-5'-P-CCNC: 157 U/L (ref 0–50)
ANION GAP SERPL CALCULATED.3IONS-SCNC: 11 MMOL/L (ref 7–15)
AST SERPL W P-5'-P-CCNC: 68 U/L (ref 0–45)
BASOPHILS # BLD AUTO: 0 10E3/UL (ref 0–0.2)
BASOPHILS NFR BLD AUTO: 0 %
BILIRUB SERPL-MCNC: 0.4 MG/DL
BUN SERPL-MCNC: 13.7 MG/DL (ref 6–20)
CALCIUM SERPL-MCNC: 9.3 MG/DL (ref 8.6–10)
CHLORIDE SERPL-SCNC: 103 MMOL/L (ref 98–107)
CREAT SERPL-MCNC: 0.64 MG/DL (ref 0.51–0.95)
DEPRECATED HCO3 PLAS-SCNC: 25 MMOL/L (ref 22–29)
EGFRCR SERPLBLD CKD-EPI 2021: >90 ML/MIN/1.73M2
EOSINOPHIL # BLD AUTO: 0.2 10E3/UL (ref 0–0.7)
EOSINOPHIL NFR BLD AUTO: 4 %
ERYTHROCYTE [DISTWIDTH] IN BLOOD BY AUTOMATED COUNT: 13.1 % (ref 10–15)
FERRITIN SERPL-MCNC: 301 NG/ML (ref 6–175)
GLUCOSE SERPL-MCNC: 210 MG/DL (ref 70–99)
HCT VFR BLD AUTO: 43.1 % (ref 35–47)
HGB BLD-MCNC: 14.1 G/DL (ref 11.7–15.7)
IMM GRANULOCYTES # BLD: 0 10E3/UL
IMM GRANULOCYTES NFR BLD: 0 %
LYMPHOCYTES # BLD AUTO: 1.7 10E3/UL (ref 0.8–5.3)
LYMPHOCYTES NFR BLD AUTO: 30 %
MCH RBC QN AUTO: 26.4 PG (ref 26.5–33)
MCHC RBC AUTO-ENTMCNC: 32.7 G/DL (ref 31.5–36.5)
MCV RBC AUTO: 81 FL (ref 78–100)
MONOCYTES # BLD AUTO: 0.4 10E3/UL (ref 0–1.3)
MONOCYTES NFR BLD AUTO: 6 %
NEUTROPHILS # BLD AUTO: 3.4 10E3/UL (ref 1.6–8.3)
NEUTROPHILS NFR BLD AUTO: 60 %
PLATELET # BLD AUTO: 227 10E3/UL (ref 150–450)
POTASSIUM SERPL-SCNC: 4.4 MMOL/L (ref 3.4–5.3)
PROT SERPL-MCNC: 7.9 G/DL (ref 6.4–8.3)
RBC # BLD AUTO: 5.34 10E6/UL (ref 3.8–5.2)
SODIUM SERPL-SCNC: 139 MMOL/L (ref 135–145)
TSH SERPL DL<=0.005 MIU/L-ACNC: 1.77 UIU/ML (ref 0.3–4.2)
VIT D+METAB SERPL-MCNC: 11 NG/ML (ref 20–50)
WBC # BLD AUTO: 5.7 10E3/UL (ref 4–11)

## 2024-03-04 PROCEDURE — 90471 IMMUNIZATION ADMIN: CPT | Performed by: FAMILY MEDICINE

## 2024-03-04 PROCEDURE — 82728 ASSAY OF FERRITIN: CPT | Performed by: FAMILY MEDICINE

## 2024-03-04 PROCEDURE — 90677 PCV20 VACCINE IM: CPT | Performed by: FAMILY MEDICINE

## 2024-03-04 PROCEDURE — 85025 COMPLETE CBC W/AUTO DIFF WBC: CPT | Performed by: FAMILY MEDICINE

## 2024-03-04 PROCEDURE — 84443 ASSAY THYROID STIM HORMONE: CPT | Performed by: FAMILY MEDICINE

## 2024-03-04 PROCEDURE — 83036 HEMOGLOBIN GLYCOSYLATED A1C: CPT | Performed by: FAMILY MEDICINE

## 2024-03-04 PROCEDURE — 82306 VITAMIN D 25 HYDROXY: CPT | Performed by: FAMILY MEDICINE

## 2024-03-04 PROCEDURE — 99214 OFFICE O/P EST MOD 30 MIN: CPT | Mod: 25 | Performed by: FAMILY MEDICINE

## 2024-03-04 PROCEDURE — 36415 COLL VENOUS BLD VENIPUNCTURE: CPT | Performed by: FAMILY MEDICINE

## 2024-03-04 PROCEDURE — 80053 COMPREHEN METABOLIC PANEL: CPT | Performed by: FAMILY MEDICINE

## 2024-03-04 RX ORDER — KETOCONAZOLE 20 MG/ML
SHAMPOO TOPICAL
Qty: 120 ML | Refills: 1 | Status: SHIPPED | OUTPATIENT
Start: 2024-03-04 | End: 2024-04-04

## 2024-03-04 ASSESSMENT — ENCOUNTER SYMPTOMS: HEADACHES: 1

## 2024-03-04 NOTE — PROGRESS NOTES
"  Assessment & Plan     Hair loss  Suspect mild telogen effluvium, but check labs as below  - CBC with platelets and differential  - Ferritin  - TSH with free T4 reflex  - Comprehensive metabolic panel (BMP + Alb, Alk Phos, ALT, AST, Total. Bili, TP)  - Vitamin D Deficiency    Scalp itch  Trial of ketoconzaole, instructed on use, follow up in one month  - ketoconazole (NIZORAL) 2 % external shampoo  Dispense: 120 mL; Refill: 1    Migraine without aura and without status migrainosus, not intractable  Using propranolol \"as needed\" and getting relief with that. Propranolol chosen due to limited choices in breast feeding. Will optimize asthma before starting daily propranolol    Mild persistent asthma without complication  Reviewed instructions for inhalers and how to use each. \"Orange inhaler\" flovent needs to be taken twice daily every day whether sick or well. \"Blue inhaler\" albuterol every 4-6 hours as needed. Recheck in one month after taking correctly.       ADDENDUM: A1c was added due to glucose >200 on CMP. A1c returned high- 9.5% Cross-covering MD prescribed metformin for DM2, referred to DM ed, prescribed vit D.     I have placed order for RUQ ultrasound for elevated liver labs.           BMI  Estimated body mass index is 31.45 kg/m  as calculated from the following:    Height as of this encounter: 1.524 m (5').    Weight as of this encounter: 73 kg (161 lb 0.6 oz).             Enedelia Gonzalez is a 33 year old, presenting for the following health issues:  Headache and Asthma        3/4/2024     8:05 AM   Additional Questions   Roomed by danis graff MA   Accompanied by self     Here for follow up on asthma and headaches    Asthma: since last seen, cough is a little better. Using \"orange inhaler\" flovent only \"when needed,\" which has been 3-4 times/week.   Using \"other inhaler\" albuterol twice daily. Misunderstood which inhaler to be using when.    Headaches: Started propranolol shortly after last visit (2/12), but " [FreeTextEntry1] : Patient has normal exam and no signs of concussion, he doesn't need imaging. \par \par Reassured, told to return PRN "due to concern over flaring asthma, has been taking only \"as needed\" a few times a week. Finds even this to be helpful.     Hair loss: diffuse hair loss since delivery (about 10 mos ago), had noticed some hair loss prior, but now worse. Diffuse thinning. Small pimples on scalp. Scalp itching. Was given rx shampoo, did not help (per chart rx for selsun blue). Not beaking off but coming out from root.                     Objective    BP 99/71   Pulse 74   Temp 98.2  F (36.8  C) (Oral)   Resp 24   Ht 1.524 m (5')   Wt 73 kg (161 lb 0.6 oz)   SpO2 100%   BMI 31.45 kg/m    Body mass index is 31.45 kg/m .  Physical Exam   GENERAL: alert and no distress  EYES: Eyes grossly normal to inspection, conjunctivae and sclerae normal  RESP: lungs clear to auscultation - no rales, rhonchi or wheezes  CV: regular rate and rhythm, normal S1 S2, no S3 or S4, no murmur, click or rub, no peripheral edema  Scalp/hair: difficult to appreciate thinning without prior comparison. No areas of focal hair loss. Mildly erythematous scalp. A few tiny pimples.           Signed Electronically by: Monisha Holcomb MD    "

## 2024-03-07 ENCOUNTER — TELEPHONE (OUTPATIENT)
Dept: FAMILY MEDICINE | Facility: CLINIC | Age: 34
End: 2024-03-07
Payer: COMMERCIAL

## 2024-03-07 ENCOUNTER — NURSE TRIAGE (OUTPATIENT)
Dept: NURSING | Facility: CLINIC | Age: 34
End: 2024-03-07
Payer: COMMERCIAL

## 2024-03-07 DIAGNOSIS — E55.9 VITAMIN D DEFICIENCY: ICD-10-CM

## 2024-03-07 DIAGNOSIS — E11.9 TYPE 2 DIABETES MELLITUS WITHOUT COMPLICATION, WITHOUT LONG-TERM CURRENT USE OF INSULIN (H): Primary | ICD-10-CM

## 2024-03-07 LAB — HBA1C MFR BLD: 9.5 % (ref 0–5.6)

## 2024-03-07 RX ORDER — METFORMIN HCL 500 MG
500 TABLET, EXTENDED RELEASE 24 HR ORAL 2 TIMES DAILY WITH MEALS
Qty: 180 TABLET | Refills: 3 | Status: SHIPPED | OUTPATIENT
Start: 2024-03-07 | End: 2024-04-04

## 2024-03-07 RX ORDER — ERGOCALCIFEROL 1.25 MG/1
50000 CAPSULE, LIQUID FILLED ORAL WEEKLY
Qty: 12 CAPSULE | Refills: 1 | Status: SHIPPED | OUTPATIENT
Start: 2024-03-07 | End: 2024-07-31

## 2024-03-07 NOTE — TELEPHONE ENCOUNTER
Received call from patient with an , stating that she was returning a call from the clinic. Found the following note from provider:        And also this note from her PCP earlier in the day:      Relayed the above information to patient. She states she received a call from the Diabetes Educator and has an appt scheduled for 3/27. She also states she is breastfeeding and asked if it is safe to take Metformin while breastfeeding. According to [employer], found the following:        Relayed the above information to patient. Explained that order for ultrasound is not yet in place. Advised her to call the clinic if she does not hear from them by tomorrow early afternoon. Patient had no further questions and plans to follow advice.    Reason for Disposition   Follow-up information-only call to recent contact, no triage required    Protocols used: Information Only Call - No Triage-A-OH

## 2024-03-07 NOTE — TELEPHONE ENCOUNTER
Reviewed labs while covering for Dr. Holcomb.     Patient has newly diagnosed Type 2 diabetes with A1C of 9.5. Please tell her I recommend starting a medication for this, metformin. Would start 1 pill per day and increase to 1 pill TWICE  daily after 1 week. Can discuss more when she sees Dr. Langley on 4/4. I will go ahead and send it to the pharmacy. She may have some nausea or diarrhea for the first week or 2 but it should get better. Would also recommend seeing diabetic educator to learn more about diabetes and I will go ahead and place that referral. It is possible this is part of why she has so much hair loss.     Also, her vitamin d is very low. I am sending a high dose vit D pill to the pharmacy and she should take 1 pill per week for 12 weeks.    Routing to Dr. Lindo, PCP, and Dr. Langley who is seeing her next .    Capri Gannon MD

## 2024-03-08 PROBLEM — E11.9 DIABETES MELLITUS, TYPE 2 (H): Status: ACTIVE | Noted: 2024-03-08

## 2024-03-11 NOTE — TELEPHONE ENCOUNTER
Called patient with assistance of an  to relay provider test result and recommendation below.  Patient reported picked up medications on 3/7 and took medication(s) Metformin and Vitamin D according to instruction on bottles.   RN also assisted with an imaging appt for the abdomen per referral placed 3/8/24 by Dr. Holcomb. Future appt with Dr. Langley provided. Dates and times of all future appointments verified/confirmed with patient.      Will route to provider Metformin has been taking BID as instructed.  Reported tolerating both new medications without side affect experienced thus far or other issue.     SABAS Singh, RN  [facility]      Reviewed labs while covering for Dr. Holcomb.      Patient has newly diagnosed Type 2 diabetes with A1C of 9.5. Please tell her I recommend starting a medication for this, metformin. Would start 1 pill per day and increase to 1 pill TWICE  daily after 1 week. Can discuss more when she sees Dr. Langley on 4/4. I will go ahead and send it to the pharmacy. She may have some nausea or diarrhea for the first week or 2 but it should get better. Would also recommend seeing diabetic educator to learn more about diabetes and I will go ahead and place that referral. It is possible this is part of why she has so much hair loss.      Also, her vitamin d is very low. I am sending a high dose vit D pill to the pharmacy and she should take 1 pill per week for 12 weeks.     Routing to Dr. Lindo, PCP, and Dr. Langley who is seeing her next .     Capri Gannon MD

## 2024-03-18 ENCOUNTER — HOSPITAL ENCOUNTER (OUTPATIENT)
Dept: ULTRASOUND IMAGING | Facility: HOSPITAL | Age: 34
Discharge: HOME | End: 2024-03-18
Attending: FAMILY MEDICINE | Admitting: FAMILY MEDICINE
Payer: COMMERCIAL

## 2024-03-18 DIAGNOSIS — R74.01 NONSPECIFIC ELEVATION OF LEVELS OF TRANSAMINASE OR LACTIC ACID DEHYDROGENASE (LDH): ICD-10-CM

## 2024-03-18 DIAGNOSIS — R74.02 NONSPECIFIC ELEVATION OF LEVELS OF TRANSAMINASE OR LACTIC ACID DEHYDROGENASE (LDH): ICD-10-CM

## 2024-03-18 PROCEDURE — 76705 ECHO EXAM OF ABDOMEN: CPT

## 2024-03-19 ENCOUNTER — TELEPHONE (OUTPATIENT)
Dept: FAMILY MEDICINE | Facility: CLINIC | Age: 34
End: 2024-03-19
Payer: COMMERCIAL

## 2024-03-19 NOTE — TELEPHONE ENCOUNTER
Called pt and relayed message. Pt verbalized understanding.      Dawit Dawson, BSN RN  [facility]        ----- Message from Mitzi Rose MD sent at 3/18/2024 11:46 AM CDT -----  Team - please call patient with results. The liver ultrasound that Dr. Holcomb ordered shows some fat in the liver, which. Treating her diabetes and weight loss will help this. We can discuss more at her upcoming appointment (I am seeing her on 4/4).

## 2024-04-04 ENCOUNTER — OFFICE VISIT (OUTPATIENT)
Dept: FAMILY MEDICINE | Facility: CLINIC | Age: 34
End: 2024-04-04
Payer: COMMERCIAL

## 2024-04-04 ENCOUNTER — TELEPHONE (OUTPATIENT)
Dept: FAMILY MEDICINE | Facility: CLINIC | Age: 34
End: 2024-04-04

## 2024-04-04 VITALS
HEIGHT: 60 IN | RESPIRATION RATE: 12 BRPM | HEART RATE: 70 BPM | SYSTOLIC BLOOD PRESSURE: 112 MMHG | BODY MASS INDEX: 30.43 KG/M2 | DIASTOLIC BLOOD PRESSURE: 74 MMHG | WEIGHT: 155 LBS | OXYGEN SATURATION: 99 % | TEMPERATURE: 98.7 F

## 2024-04-04 DIAGNOSIS — G62.9 NEUROPATHY: ICD-10-CM

## 2024-04-04 DIAGNOSIS — J45.20 MILD INTERMITTENT ASTHMA WITHOUT COMPLICATION: ICD-10-CM

## 2024-04-04 DIAGNOSIS — H53.8 BLURRED VISION: ICD-10-CM

## 2024-04-04 DIAGNOSIS — E55.9 VITAMIN D DEFICIENCY: ICD-10-CM

## 2024-04-04 DIAGNOSIS — R74.01 TRANSAMINITIS: ICD-10-CM

## 2024-04-04 DIAGNOSIS — L20.89 OTHER ATOPIC DERMATITIS: ICD-10-CM

## 2024-04-04 DIAGNOSIS — E11.9 TYPE 2 DIABETES MELLITUS WITHOUT COMPLICATION, WITHOUT LONG-TERM CURRENT USE OF INSULIN (H): Primary | ICD-10-CM

## 2024-04-04 LAB
ALBUMIN SERPL BCG-MCNC: 4.7 G/DL (ref 3.5–5.2)
ALP SERPL-CCNC: 96 U/L (ref 40–150)
ALT SERPL W P-5'-P-CCNC: 147 U/L (ref 0–50)
AST SERPL W P-5'-P-CCNC: 54 U/L (ref 0–45)
BILIRUB DIRECT SERPL-MCNC: <0.2 MG/DL (ref 0–0.3)
BILIRUB SERPL-MCNC: 0.4 MG/DL
CHOLEST SERPL-MCNC: 195 MG/DL
CREAT UR-MCNC: 145 MG/DL
FASTING STATUS PATIENT QL REPORTED: YES
FASTING STATUS PATIENT QL REPORTED: YES
GLUCOSE SERPL-MCNC: 143 MG/DL (ref 70–99)
HDLC SERPL-MCNC: 37 MG/DL
LDLC SERPL CALC-MCNC: 133 MG/DL
MICROALBUMIN UR-MCNC: <12 MG/L
MICROALBUMIN/CREAT UR: NORMAL MG/G{CREAT}
NONHDLC SERPL-MCNC: 158 MG/DL
PROT SERPL-MCNC: 8 G/DL (ref 6.4–8.3)
TRIGL SERPL-MCNC: 126 MG/DL
VIT B12 SERPL-MCNC: 1006 PG/ML (ref 232–1245)

## 2024-04-04 PROCEDURE — 80061 LIPID PANEL: CPT | Performed by: FAMILY MEDICINE

## 2024-04-04 PROCEDURE — 82043 UR ALBUMIN QUANTITATIVE: CPT | Performed by: FAMILY MEDICINE

## 2024-04-04 PROCEDURE — 36415 COLL VENOUS BLD VENIPUNCTURE: CPT | Performed by: FAMILY MEDICINE

## 2024-04-04 PROCEDURE — 80076 HEPATIC FUNCTION PANEL: CPT | Performed by: FAMILY MEDICINE

## 2024-04-04 PROCEDURE — 82570 ASSAY OF URINE CREATININE: CPT | Performed by: FAMILY MEDICINE

## 2024-04-04 PROCEDURE — T1013 SIGN LANG/ORAL INTERPRETER: HCPCS

## 2024-04-04 PROCEDURE — 99215 OFFICE O/P EST HI 40 MIN: CPT | Performed by: FAMILY MEDICINE

## 2024-04-04 PROCEDURE — 82607 VITAMIN B-12: CPT | Performed by: FAMILY MEDICINE

## 2024-04-04 PROCEDURE — 82947 ASSAY GLUCOSE BLOOD QUANT: CPT | Performed by: FAMILY MEDICINE

## 2024-04-04 RX ORDER — LANCETS
EACH MISCELLANEOUS
Qty: 100 EACH | Refills: 6 | Status: CANCELLED | OUTPATIENT
Start: 2024-04-04

## 2024-04-04 RX ORDER — TRIAMCINOLONE ACETONIDE 1 MG/G
CREAM TOPICAL 2 TIMES DAILY
Qty: 30 G | Refills: 1 | Status: SHIPPED | OUTPATIENT
Start: 2024-04-04

## 2024-04-04 RX ORDER — ERGOCALCIFEROL 1.25 MG/1
50000 CAPSULE, LIQUID FILLED ORAL WEEKLY
Qty: 12 CAPSULE | Refills: 1 | Status: CANCELLED | OUTPATIENT
Start: 2024-04-04

## 2024-04-04 RX ORDER — EMOLLIENT BASE
CREAM (GRAM) TOPICAL 2 TIMES DAILY
Qty: 453 G | Refills: 1 | Status: SHIPPED | OUTPATIENT
Start: 2024-04-04

## 2024-04-04 RX ORDER — METFORMIN HCL 500 MG
1000 TABLET, EXTENDED RELEASE 24 HR ORAL 2 TIMES DAILY WITH MEALS
Qty: 360 TABLET | Refills: 3 | Status: SHIPPED | OUTPATIENT
Start: 2024-04-04

## 2024-04-04 ASSESSMENT — ENCOUNTER SYMPTOMS: HEADACHES: 1

## 2024-04-04 NOTE — Clinical Note
Hello, patient had technical difficulties with her appointment on 3/27 with diabetes education. She would like to have this as a phone only visit (not video). Can you please contact her to reschedule? She would like to have teaching on how to use a glucometer - I think this would be difficult with a phone visit. I wondered if she could be seen elsewhere in person for this, or have a separate appointment with MTM - let me know which you think makes the most sense. Thanks!

## 2024-04-04 NOTE — TELEPHONE ENCOUNTER
Patient Quality Outreach    Patient is due for the following:   Diabetes -  Diabetic Follow-Up Visit  Physical Preventive Adult Physical    Next Steps:   Patient was scheduled for physical/DM    Type of outreach:    Phone, spoke to patient/parent. patient      Questions for provider review:    None           Sulma Champion MA

## 2024-04-04 NOTE — PROGRESS NOTES
Assessment & Plan     Type 2 diabetes mellitus without complication, without long-term current use of insulin (H)  Recent diagnosis on labs done for hair loss, A1c one month ago was 9.5%.  Today she had questions about diabetes and whether it can be cured or managed.  We discussed the natural history of diabetes, its effects on different organs, and management including recommended preventative care.  She is taking metformin 500mg BID without side effects - increase dose to 1000mg BID.  She would like to start checking her blood sugars at home but does not know how to use the meter.  She had an appointment with diabetes educator on March 27 but it was scheduled as a video visit and she is not sure how to use a video on her phone.  She would prefer to do a phone visit only.  I do think she would benefit from an in person appointment to get teaching on the glucometer.  Will send message to diabetes educator with this note to reschedule the appointment for March and potentially make a plan for in person appointment either with diabetes education or MTM if that is not possible.  She plans to schedule an eye clinic appointment.  - Lipid panel reflex to direct LDL Non-fasting; Future  - Albumin Random Urine Quantitative with Creat Ratio; Future  - Adult Eye  Referral; Future  - metFORMIN (GLUCOPHAGE XR) 500 MG 24 hr tablet; Take 2 tablets (1,000 mg) by mouth 2 times daily (with meals)  - Glucose; Future  - Lipid panel reflex to direct LDL Non-fasting  - Glucose  - Albumin Random Urine Quantitative with Creat Ratio    Vitamin D deficiency  She is taking weekly vitamin D.    Transaminitis  Recheck LFTs. Discussed managing diabetes and physical activity can both help fatty liver.  - Hepatic panel (Albumin, ALT, AST, Bili, Alk Phos, TP); Future  - Hepatic panel (Albumin, ALT, AST, Bili, Alk Phos, TP)    Neuropathy  Describes burning of lower legs, worse at night. May be due to diabetes. Check vit B12.  - Vitamin  "B12; Future  - Vitamin B12    Blurred vision  Does not get often, she described it as \"dizziness\" but clarified as blurry vision. Check glucose today. Continue to work on diabetes management as hyperglcemia could contribute, and get diabetic eye exam.    Other atopic dermatitis  On left ankle.  - triamcinolone (KENALOG) 0.1 % external cream; Apply topically 2 times daily  - emollient (VANICREAM) external cream; Apply topically 2 times daily    Mild intermittent asthma without complication  Well-controlled using Flovent daily.  Given asthma action plan.  Continue Flovent and albuterol as needed.      40 minutes spent by me on the date of the encounter doing chart review, history and exam, documentation and further activities per the note      BMI  Estimated body mass index is 29.84 kg/m  as calculated from the following:    Height as of this encounter: 1.535 m (5' 0.43\").    Weight as of this encounter: 70.3 kg (155 lb).   Weight management plan: Discussed healthy diet and exercise guidelines      Follow up in July with PCP Dr.Shockman rachel annual preventive and diabetes follow up.    Enedelia Gonzalez is a 33 year old, presenting for the following health issues:  Asthma, Diabetes, and Headache        4/4/2024     8:16 AM   Additional Questions   Roomed by jair graff     History of Present Illness     Asthma:  She presents for follow up of asthma.  She has no cough, no wheezing, and no shortness of breath.  She is using a relief medication a few times a week. She does not miss any doses of her controller medication throughout the week. Patient is aware of the following triggers: cold air. The patient has not had a visit to the Emergency Room, Urgent Care or Hospital due to asthma since the last clinic visit.     Diabetes:   She presents for follow up of diabetes.    She is not checking blood glucose.         She has no concerns regarding her diabetes at this time.  She is having burning in feet.  The patient has not had a " "diabetic eye exam in the last 12 months.          Headaches:   Since the patient's last clinic visit, headaches are: improved  The patient is getting headaches:  2times a week  She is able to do normal daily activities when she has a migraine.  The patient is taking the following rescue/relief medications:  Ibuprofen (Advil, Motrin), Tylenol and other   Patient states \"I get total relief\" from the rescue/relief medications.   The patient is taking the following medications to prevent migraines:  Inderal  In the past 4 weeks, the patient has gone to an Urgent Care or Emergency Room 0 times times due to headaches.        Asthma  - started using flovent every day  - uses albuerol only a few times per week  - coughing a lot, does use the albuterol inhaler and it helps a little bit      4/11/2023    10:57 AM 5/2/2023    11:00 AM 2/12/2024     8:24 AM   ACT Total Scores   ACT TOTAL SCORE (Goal Greater than or Equal to 20) 25 22 24   In the past 12 months, how many times did you visit the emergency room for your asthma without being admitted to the hospital? 0 0 0   In the past 12 months, how many times were you hospitalized overnight because of your asthma? 0 0 0     Prefers phone visit for diabetes education; does not want a video visit  Taking metformin 500mg BID    Headaches  - propanolol takes only when she remembers, sometimes 2x per day, sometimes not at all  - headaches are better  - takes 1-2x per week    Dizzy  - feels blurry vision, not dizzy  - wonders if she hasn't had enough sleep  - has not had anything to eat or drink today  - no headache  - does not happen often              Review of Systems  Constitutional, HEENT, cardiovascular, pulmonary, gi and gu systems are negative, except as otherwise noted.      Objective    /74   Pulse 70   Temp 98.7  F (37.1  C) (Oral)   Resp 12   Ht 1.535 m (5' 0.43\")   Wt 70.3 kg (155 lb)   LMP  (Exact Date)   SpO2 99%   BMI 29.84 kg/m    Body mass index is " 29.84 kg/m .  Physical Exam   GENERAL: alert and no distress  EYES: Eyes grossly normal to inspection, PERRL and conjunctivae and sclerae normal  HENT: ear canals and TM's normal, nose and mouth without ulcers or lesions  NECK: no adenopathy, no asymmetry, masses, or scars  RESP: lungs clear to auscultation - no rales, rhonchi or wheezes  CV: regular rate and rhythm, normal S1 S2, no S3 or S4, no murmur, click or rub, no peripheral edema  MS: no gross musculoskeletal defects noted, no edema  SKIN: no suspicious lesions or rashes  NEURO: Normal strength and tone, mentation intact and speech normal  PSYCH: mentation appears normal, affect normal/bright  Diabetic foot exam: normal DP and PT pulses, no trophic changes or ulcerative lesions, normal sensory exam, normal monofilament exam, and eczematous patch over left lateral malleolus            Signed Electronically by: Mitzi Rose MD

## 2024-04-04 NOTE — LETTER
My Asthma Action Plan    Name: Carlos Ivan   YOB: 1990  Date: 4/4/2024   My doctor: Mitzi Rose MD   My clinic: [facility]        My Rescue Medicine:   Albuterol inhaler (Proair/Ventolin/Proventil HFA)  2-4 puffs EVERY 4 HOURS as needed. Use a spacer if recommended by your provider.   My Asthma Severity:   Mild Persistent  Know your asthma triggers: cold air             GREEN ZONE   Good Control  I feel good  No cough or wheeze  Can work, sleep and play without asthma symptoms       Take your asthma control medicine every day.     If exercise triggers your asthma, take your rescue medication  15 minutes before exercise or sports, and  During exercise if you have asthma symptoms  Spacer to use with inhaler: If you have a spacer, make sure to use it with your inhaler             YELLOW ZONE Getting Worse  I have ANY of these:  I do not feel good  Cough or wheeze  Chest feels tight  Wake up at night   Keep taking your Green Zone medications  Start taking your rescue medicine:  every 20 minutes for up to 1 hour. Then every 4 hours for 24-48 hours.  If you stay in the Yellow Zone for more than 12-24 hours, contact your doctor.  If you do not return to the Green Zone in 12-24 hours or you get worse, start taking your oral steroid medicine if prescribed by your provider.           RED ZONE Medical Alert - Get Help  I have ANY of these:  I feel awful  Medicine is not helping  Breathing getting harder  Trouble walking or talking  Nose opens wide to breathe       Take your rescue medicine NOW  If your provider has prescribed an oral steroid medicine, start taking it NOW  Call your doctor NOW  If you are still in the Red Zone after 20 minutes and you have not reached your doctor:  Take your rescue medicine again and  Call 911 or go to the emergency room right away    See your regular doctor within 2 weeks of an Emergency Room or Urgent Care visit for follow-up treatment.           Annual Reminders:  Meet with Asthma Educator,  Flu Shot in the Fall, consider Pneumonia Vaccination for patients with asthma (aged 19 and older).    Pharmacy[facility]    [facility] - [place], [place] - [address] [facility] - [place], [place] - [address] [facility] [place], [place] - [address] [facility] [address]    Electronically signed by Mitzi Rose MD   Date: 04/04/24                    Asthma Triggers  How To Control Things That Make Your Asthma Worse    Triggers are things that make your asthma worse.  Look at the list below to help you find your triggers and   what you can do about them. You can help prevent asthma flare-ups by staying away from your triggers.      Trigger                                                          What you can do   Cigarette Smoke  Tobacco smoke can make asthma worse. Do not allow smoking in your home, car or around you.  Be sure no one smokes at a child s day care or school.  If you smoke, ask your health care provider for ways to help you quit.  Ask family members to quit too.  Ask your health care provider for a referral to Quit Plan to help you quit smoking, or call 9-982-802-PLAN.     Colds, Flu, Bronchitis  These are common triggers of asthma. Wash your hands often.  Don t touch your eyes, nose or mouth.  Get a flu shot every year.     Dust Mites  These are tiny bugs that live in cloth or carpet. They are too small to see. Wash sheets and blankets in hot water every week.   Encase pillows and mattress in dust mite proof covers.  Avoid having carpet if you can. If you have carpet, vacuum weekly.   Use a dust mask and HEPA vacuum.   Pollen and Outdoor Mold  Some people are allergic to trees, grass, or weed pollen, or molds. Try to keep your windows closed.  Limit time out doors when pollen count is high.   Ask you health care provider about taking medicine during allergy season.     Animal Dander  Some people are allergic to skin flakes,  urine or saliva from pets with fur or feathers. Keep pets with fur or feathers out of your home.    If you can t keep the pet outdoors, then keep the pet out of your bedroom.  Keep the bedroom door closed.  Keep pets off cloth furniture and away from stuffed toys.     Mice, Rats, and Cockroaches  Some people are allergic to the waste from these pests.   Cover food and garbage.  Clean up spills and food crumbs.  Store grease in the refrigerator.   Keep food out of the bedroom.   Indoor Mold  This can be a trigger if your home has high moisture. Fix leaking faucets, pipes, or other sources of water.   Clean moldy surfaces.  Dehumidify basement if it is damp and smelly.   Smoke, Strong Odors, and Sprays  These can reduce air quality. Stay away from strong odors and sprays, such as perfume, powder, hair spray, paints, smoke incense, paint, cleaning products, candles and new carpet.   Exercise or Sports  Some people with asthma have this trigger. Be active!  Ask your doctor about taking medicine before sports or exercise to prevent symptoms.    Warm up for 5-10 minutes before and after sports or exercise.     Other Triggers of Asthma  Cold air:  Cover your nose and mouth with a scarf.  Sometimes laughing or crying can be a trigger.  Some medicines and food can trigger asthma.

## 2024-04-08 ENCOUNTER — TELEPHONE (OUTPATIENT)
Dept: FAMILY MEDICINE | Facility: CLINIC | Age: 34
End: 2024-04-08
Payer: COMMERCIAL

## 2024-04-08 NOTE — TELEPHONE ENCOUNTER
----- Message from Mitzi Rose MD sent at 4/5/2024  3:18 PM CDT -----  Team - please call patient with results. Liver tests are about the same as they were before. Cholesterol levels were a little high. Continuing to work on diabetes and adding in some physical activity will help the fatty liver and decrease cholesterol levels as well. I would  not recommend cholesterol medication yet, but she can discuss further with Dr. Lindo about the risks/benefits of this type of treatment. Her urine study showed no damage to the kidneys. She should get a call about rescheduling diabetes education, which I think will also be helpful for managing her diabetes. If she does not, she should let us know.

## 2024-04-08 NOTE — TELEPHONE ENCOUNTER
"Writer called patient with the help of a \"Aura\"  regarding provider's message below. Provider message relayed to patient.    Patient verbalizes understanding, agrees with plan and has no further questions.    Closing encounter.    APRIL AlexanderN, RN   [facility]    "

## 2024-04-16 ENCOUNTER — TRANSFERRED RECORDS (OUTPATIENT)
Dept: HEALTH INFORMATION MANAGEMENT | Facility: CLINIC | Age: 34
End: 2024-04-16

## 2024-04-16 LAB — RETINOPATHY: NEGATIVE

## 2024-05-02 ENCOUNTER — ALLIED HEALTH/NURSE VISIT (OUTPATIENT)
Dept: EDUCATION SERVICES | Facility: CLINIC | Age: 34
End: 2024-05-02
Payer: COMMERCIAL

## 2024-05-02 VITALS — WEIGHT: 160 LBS | BODY MASS INDEX: 30.8 KG/M2

## 2024-05-02 DIAGNOSIS — E11.9 TYPE 2 DIABETES MELLITUS WITHOUT COMPLICATION, WITHOUT LONG-TERM CURRENT USE OF INSULIN (H): Primary | ICD-10-CM

## 2024-05-02 PROCEDURE — G0108 DIAB MANAGE TRN  PER INDIV: HCPCS | Mod: AE | Performed by: DIETITIAN, REGISTERED

## 2024-05-02 RX ORDER — LANCETS
EACH MISCELLANEOUS
Qty: 100 EACH | Refills: 4 | Status: SHIPPED | OUTPATIENT
Start: 2024-05-02

## 2024-05-02 RX ORDER — BLOOD SUGAR DIAGNOSTIC
STRIP MISCELLANEOUS
Qty: 100 STRIP | Refills: 6 | Status: SHIPPED | OUTPATIENT
Start: 2024-05-02

## 2024-05-02 NOTE — PROGRESS NOTES
Diabetes Self-Management Education & Support/ 60 minutes    Presents for: Initial Assessment for new diagnosis    Type of Service: In Person Visit      ASSESSMENT:  Initial Education for new diagnosis. She reports of polydipsia and heavy eyelids after meals when she feels like her BG is high.  Carlos reported no family history of Diabetes. She is currently taking metformin XR 2000 mg/day, she is not checking BG at home. Carlos was instructed on use of the accu check guide me meter: [id number] mg/dl. Carlos is not currently exercising, although at times she is active with her children. She is consuming two meals/day: 2 c rice with boiled vegetables and meat, she seldom snacks, but if she does, it is fruit, no sugary snacks. She is drinking water or broth, no sugary beverages. Carlos is UTD on a yearly eye exam, she has not had a recent dental cleaning.     Patient's most recent   Lab Results   Component Value Date    A1C 9.5 03/04/2024     is not meeting goal of <8.0    Diabetes knowledge and skills assessment:   Patient is knowledgeable in diabetes management concepts related to: new diagnosis, topics reviewed    Continue education with the following diabetes management concepts: Healthy Eating, Being Active, Monitoring, Taking Medication, and Reducing Risks    Based on learning assessment above, most appropriate setting for further diabetes education would be: Individual setting.      PLAN  Decrease rice to one small bowl per meal. You can add more vegetables, and lean meat.   Test blood sugar in the morning before eating, with the goal to be under 130 and two hours after a meal, with the goal to be under 180.  Add in 10-15 minutes of exercise after meals: walking or play with your children, or walk in your home.   Schedule a dental cleaning appointment  If you have fruit, have it as a snack, not with your rice meal.   Limit corn to two ears per meal, no rice. More vegetables.    Topics to cover at upcoming visits: Healthy  "Eating, Being Active, Monitoring, Taking Medication, and Reducing Risks    Follow-up: 5/23/24    See Care Plan for co-developed, patient-state behavior change goals.  AVS provided for patient today.    Education Materials Provided:  No new materials provided today      SUBJECTIVE/OBJECTIVE:  Presents for: Initial Assessment for new diagnosis  Accompanied by: Self,   Diabetes education in the past 24mo: No  Focus of Visit: Monitoring, Reducing Risks, Taking Medication, Healthy Eating  Diabetes type: Type 2  Date of diagnosis: 3/4/24  Disease course: Stable  Other concerns:: English as a second language  Cultural Influences/Ethnic Background:  Not  or       Diabetes Symptoms & Complications:  Diabetes Related Symptoms: Polydipsia (increased thirst), Neuropathy (eyelids want to close after meals,)  Disease course: Stable       Patient Problem List and Family Medical History reviewed for relevant medical history, current medical status, and diabetes risk factors.    Vitals:  Wt 72.6 kg (160 lb)   BMI 30.80 kg/m    Estimated body mass index is 30.8 kg/m  as calculated from the following:    Height as of 4/4/24: 1.535 m (5' 0.43\").    Weight as of this encounter: 72.6 kg (160 lb).   Last 3 BP:   BP Readings from Last 3 Encounters:   04/04/24 112/74   03/04/24 99/71   02/12/24 123/81       History   Smoking Status    Never   Smokeless Tobacco    Current    Types: Chew       Labs:  Lab Results   Component Value Date    A1C 9.5 03/04/2024     Lab Results   Component Value Date     04/04/2024     02/08/2021     Lab Results   Component Value Date     04/04/2024     Direct Measure HDL   Date Value Ref Range Status   04/04/2024 37 (L) >=50 mg/dL Final   ]  GFR Estimate   Date Value Ref Range Status   03/04/2024 >90 >60 mL/min/1.73m2 Final   02/08/2021 >60 >60 mL/min/1.73m2 Final     GFR Estimate If Black   Date Value Ref Range Status   02/08/2021 >60 >60 mL/min/1.73m2 Final     Lab " "Results   Component Value Date    CR 0.64 03/04/2024     No results found for: \"MICROALBUMIN\"    Healthy Eating:  Healthy Eating Assessed Today: Yes  Meals include: Breakfast, Dinner  Breakfast: mostly rice, vegetables, some meat  Dinner: similiar to morning meal  Beverages: Water (broth)  Has patient met with a dietitian in the past?: No    Being Active:  Being Active Assessed Today: Yes (if she feels energized, she will play with her kids)  Exercise:: Currently not exercising    Monitoring:  Monitoring Assessed Today: Yes (patient instructed on accu check guide me: [id number])        Taking Medications:  Diabetes Medication(s)       Biguanides       metFORMIN (GLUCOPHAGE XR) 500 MG 24 hr tablet Take 2 tablets (1,000 mg) by mouth 2 times daily (with meals)            Taking Medication Assessed Today: Yes  Current Treatments: Oral Medication (taken by mouth)  Problems taking diabetes medications regularly?: No    Problem Solving:  Problem Solving Assessed Today: Yes  Is the patient at risk for hypoglycemia?: No              Reducing Risks:  Reducing Risks Assessed Today: Yes  Diabetes Risks: Ethnicity, Sedentary Lifestyle  Has dilated eye exam at least once a year?: Yes  Sees dentist every 6 months?: No    Healthy Coping:  Emotional response to diabetes: Ready to learn  Stage of change: PREPARATION (Decided to change - considering how)  Patient Activation Measure Survey Score:       No data to display                  Care Plan and Education Provided:  Healthy Eating: Portion control and Weight Management, Being Active: encouraged 10-15 minutes of walking after meals, Monitoring: Frequency of monitoring, Individual glucose targets, and Log and interpret results, Taking Medication: When to take medication(s), Problem Solving: High glucose - causes, signs/symptoms, treatment and prevention, and Reducing Risks: Dental care, Eye care, and Goal for A1c, how it relates to glucose and how often to check        Time Spent: 60 " minutes  Encounter Type: Individual    Any diabetes medication dose changes were made via the CDE Protocol per the patient's primary care provider. A copy of this encounter was shared with the provider.

## 2024-05-02 NOTE — LETTER
5/2/2024         RE: Carlos Ivan  [address]  [place] [facility] [place] [address]        Dear Colleague,    Thank you for referring your patient, Carlos Ivan, to the [facility]. Please see a copy of my visit note below.    Diabetes Self-Management Education & Support/ 60 minutes    Presents for: Initial Assessment for new diagnosis    Type of Service: In Person Visit      ASSESSMENT:  Initial Education for new diagnosis. She reports of polydipsia and heavy eyelids after meals when she feels like her BG is high.  Carlos reported no family history of Diabetes. She is currently taking metformin XR 2000 mg/day, she is not checking BG at home. Carlos was instructed on use of the accu check guide me meter: [id number] mg/dl. Carlos is not currently exercising, although at times she is active with her children. She is consuming two meals/day: 2 c rice with boiled vegetables and meat, she seldom snacks, but if she does, it is fruit, no sugary snacks. She is drinking water or broth, no sugary beverages. Carlos is UTD on a yearly eye exam, she has not had a recent dental cleaning.     Patient's most recent   Lab Results   Component Value Date    A1C 9.5 03/04/2024     is not meeting goal of <8.0    Diabetes knowledge and skills assessment:   Patient is knowledgeable in diabetes management concepts related to: new diagnosis, topics reviewed    Continue education with the following diabetes management concepts: Healthy Eating, Being Active, Monitoring, Taking Medication, and Reducing Risks    Based on learning assessment above, most appropriate setting for further diabetes education would be: Individual setting.      PLAN  Decrease rice to one small bowl per meal. You can add more vegetables, and lean meat.   Test blood sugar in the morning before eating, with the goal to be under 130 and two hours after a meal, with the goal to be under 180.  Add in 10-15 minutes of exercise after meals: walking or play with your  "children, or walk in your home.   Schedule a dental cleaning appointment  If you have fruit, have it as a snack, not with your rice meal.   Limit corn to two ears per meal, no rice. More vegetables.    Topics to cover at upcoming visits: Healthy Eating, Being Active, Monitoring, Taking Medication, and Reducing Risks    Follow-up: 5/23/24    See Care Plan for co-developed, patient-state behavior change goals.  AVS provided for patient today.    Education Materials Provided:  No new materials provided today      SUBJECTIVE/OBJECTIVE:  Presents for: Initial Assessment for new diagnosis  Accompanied by: Self,   Diabetes education in the past 24mo: No  Focus of Visit: Monitoring, Reducing Risks, Taking Medication, Healthy Eating  Diabetes type: Type 2  Date of diagnosis: 3/4/24  Disease course: Stable  Other concerns:: English as a second language  Cultural Influences/Ethnic Background:  Not  or       Diabetes Symptoms & Complications:  Diabetes Related Symptoms: Polydipsia (increased thirst), Neuropathy (eyelids want to close after meals,)  Disease course: Stable       Patient Problem List and Family Medical History reviewed for relevant medical history, current medical status, and diabetes risk factors.    Vitals:  Wt 72.6 kg (160 lb)   BMI 30.80 kg/m    Estimated body mass index is 30.8 kg/m  as calculated from the following:    Height as of 4/4/24: 1.535 m (5' 0.43\").    Weight as of this encounter: 72.6 kg (160 lb).   Last 3 BP:   BP Readings from Last 3 Encounters:   04/04/24 112/74   03/04/24 99/71   02/12/24 123/81       History   Smoking Status     Never   Smokeless Tobacco     Current     Types: Chew       Labs:  Lab Results   Component Value Date    A1C 9.5 03/04/2024     Lab Results   Component Value Date     04/04/2024     02/08/2021     Lab Results   Component Value Date     04/04/2024     Direct Measure HDL   Date Value Ref Range Status   04/04/2024 37 (L) " ">=50 mg/dL Final   ]  GFR Estimate   Date Value Ref Range Status   03/04/2024 >90 >60 mL/min/1.73m2 Final   02/08/2021 >60 >60 mL/min/1.73m2 Final     GFR Estimate If Black   Date Value Ref Range Status   02/08/2021 >60 >60 mL/min/1.73m2 Final     Lab Results   Component Value Date    CR 0.64 03/04/2024     No results found for: \"MICROALBUMIN\"    Healthy Eating:  Healthy Eating Assessed Today: Yes  Meals include: Breakfast, Dinner  Breakfast: mostly rice, vegetables, some meat  Dinner: similiar to morning meal  Beverages: Water (broth)  Has patient met with a dietitian in the past?: No    Being Active:  Being Active Assessed Today: Yes (if she feels energized, she will play with her kids)  Exercise:: Currently not exercising    Monitoring:  Monitoring Assessed Today: Yes (patient instructed on accu check guide me: [id number])        Taking Medications:  Diabetes Medication(s)       Biguanides       metFORMIN (GLUCOPHAGE XR) 500 MG 24 hr tablet Take 2 tablets (1,000 mg) by mouth 2 times daily (with meals)            Taking Medication Assessed Today: Yes  Current Treatments: Oral Medication (taken by mouth)  Problems taking diabetes medications regularly?: No    Problem Solving:  Problem Solving Assessed Today: Yes  Is the patient at risk for hypoglycemia?: No              Reducing Risks:  Reducing Risks Assessed Today: Yes  Diabetes Risks: Ethnicity, Sedentary Lifestyle  Has dilated eye exam at least once a year?: Yes  Sees dentist every 6 months?: No    Healthy Coping:  Emotional response to diabetes: Ready to learn  Stage of change: PREPARATION (Decided to change - considering how)  Patient Activation Measure Survey Score:       No data to display                  Care Plan and Education Provided:  Healthy Eating: Portion control and Weight Management, Being Active: encouraged 10-15 minutes of walking after meals, Monitoring: Frequency of monitoring, Individual glucose targets, and Log and interpret results, Taking " Medication: When to take medication(s), Problem Solving: High glucose - causes, signs/symptoms, treatment and prevention, and Reducing Risks: Dental care, Eye care, and Goal for A1c, how it relates to glucose and how often to check        Time Spent: 60 minutes  Encounter Type: Individual    Any diabetes medication dose changes were made via the CDE Protocol per the patient's primary care provider. A copy of this encounter was shared with the provider.

## 2024-05-02 NOTE — PATIENT INSTRUCTIONS
Decrease rice to one small bowl per meal. You can add more vegetables, and lean meat.   Test blood sugar in the morning before eating, with the goal to be under 130 and two hours after a meal, with the goal to be under 180.  Add in 10-15 minutes of exercise after meals: walking or play with your children, or walk in your home.   Schedule a dental cleaning appointment  If you have fruit, have it as a snack, not with your rice meal.   Limit corn to two ears per meal, no rice. More vegetables.

## 2024-05-23 ENCOUNTER — VIRTUAL VISIT (OUTPATIENT)
Dept: EDUCATION SERVICES | Facility: CLINIC | Age: 34
End: 2024-05-23
Payer: COMMERCIAL

## 2024-05-23 DIAGNOSIS — E11.9 TYPE 2 DIABETES MELLITUS WITHOUT COMPLICATION, WITHOUT LONG-TERM CURRENT USE OF INSULIN (H): Primary | ICD-10-CM

## 2024-05-23 PROCEDURE — 98967 PH1 ASSMT&MGMT NQHP 11-20: CPT | Mod: 93 | Performed by: DIETITIAN, REGISTERED

## 2024-05-23 NOTE — PROGRESS NOTES
Diabetes Self-Management Education & Support/ 19 minutes through professional  [id number]    Presents for: Follow-up    Type of Service: Telephone Visit    Originating Location (Patient Location): Home  Distant Location (Provider Location): [facility]  Mode of Communication:  Telephone    Telephone Visit Start Time:  1p  Telephone Visit End Time (telephone visit stop time): 1:19    How would patient like to obtain AVS? Ravinderhart      ASSESSMENT:  Carlos has been checking her BG two times daily at home, FBS and PP. BG readings are not meeting glycemic goals. She is taking Metformin 2000 mg/day. Carlos has decreased her rice portions to one small bowl/meal, she rarely snacks and has not had corn since our last conversation. She tried walking after meals, but felt like it made her shaky. She checked her BG and it was in the low 100's. We reviewed s/s of hypoglycemia and treatment. We discussed the need to add in an additional medication to meet glycemic goals. Carlos does not want to do an injectable medication, but she will take another oral medication. She reported that she is currently not breastfeeding.     Patient's most recent   Lab Results   Component Value Date    A1C 9.5 03/04/2024     is not meeting goal of <8.0    Diabetes knowledge and skills assessment:   Patient is knowledgeable in diabetes management concepts related to: Healthy Eating, Monitoring, and Taking Medication    Continue education with the following diabetes management concepts: Taking Medication    Based on learning assessment above, most appropriate setting for further diabetes education would be: Individual setting.      PLAN  Continue to have just one small bowl or rice per meal. You can add more vegetables, and lean meat.   Test blood sugar in the morning before eating, with the goal to be under 130 and two hours after a meal, with the goal to be under 180.  Add in 10-15 minutes of exercise after meals: walking or  "play with your children, or walk in your home.   Schedule a dental cleaning appointment  If you have fruit, have it as a snack, not with your rice meal.   Limit corn to two ears per meal, no rice. More vegetables.    Topics to cover at upcoming visits: Taking Medication    Follow-up: TBD when a new medication is started    See Care Plan for co-developed, patient-state behavior change goals.  AVS provided for patient today.    Education Materials Provided:  No new materials provided today      SUBJECTIVE/OBJECTIVE:  Presents for: Follow-up  Accompanied by: Self,   Diabetes education in the past 24mo: Yes  Focus of Visit: Monitoring, Reducing Risks, Taking Medication, Healthy Eating  Diabetes type: Type 2  Date of diagnosis: 3/4/24  Disease course: Stable  Other concerns:: English as a second language  Cultural Influences/Ethnic Background:  Not  or       Diabetes Symptoms & Complications:  Diabetes Related Symptoms: Polydipsia (increased thirst), Neuropathy (eyelids want to close after meals,)  Disease course: Stable       Patient Problem List and Family Medical History reviewed for relevant medical history, current medical status, and diabetes risk factors.    Vitals:  There were no vitals taken for this visit.  Estimated body mass index is 30.8 kg/m  as calculated from the following:    Height as of 4/4/24: 1.535 m (5' 0.43\").    Weight as of 5/2/24: 72.6 kg (160 lb).   Last 3 BP:   BP Readings from Last 3 Encounters:   04/04/24 112/74   03/04/24 99/71   02/12/24 123/81       History   Smoking Status    Never   Smokeless Tobacco    Current    Types: Chew       Labs:  Lab Results   Component Value Date    A1C 9.5 03/04/2024     Lab Results   Component Value Date     04/04/2024     02/08/2021     Lab Results   Component Value Date     04/04/2024     Direct Measure HDL   Date Value Ref Range Status   04/04/2024 37 (L) >=50 mg/dL Final   ]  GFR Estimate   Date Value Ref " "Range Status   03/04/2024 >90 >60 mL/min/1.73m2 Final   02/08/2021 >60 >60 mL/min/1.73m2 Final     GFR Estimate If Black   Date Value Ref Range Status   02/08/2021 >60 >60 mL/min/1.73m2 Final     Lab Results   Component Value Date    CR 0.64 03/04/2024     No results found for: \"MICROALBUMIN\"    Healthy Eating:  Healthy Eating Assessed Today: Yes  Meals include: Breakfast, Dinner  Breakfast: mostly rice, vegetables, some meat  Dinner: similiar to morning meal  Other: Carlos has cut back on her rice portions.  Beverages: Water (broth)  Has patient met with a dietitian in the past?: Yes    Being Active:  Being Active Assessed Today: Yes (if she feels energized, she will play with her kids)  Exercise:: Currently not exercising    Monitoring:  Monitoring Assessed Today: Yes (patient instructed on accu check guide me: [id number])  Blood Glucose Meter: Unknown  Times checking blood sugar at home (number): 2    FBS:147,130,127,131,115,134  PP:252,272,121,210,225    Taking Medications:  Diabetes Medication(s)       Biguanides       metFORMIN (GLUCOPHAGE XR) 500 MG 24 hr tablet Take 2 tablets (1,000 mg) by mouth 2 times daily (with meals)            Taking Medication Assessed Today: Yes  Current Treatments: Oral Medication (taken by mouth)  Problems taking diabetes medications regularly?: No    Problem Solving:  Problem Solving Assessed Today: Yes  Is the patient at risk for hypoglycemia?: No              Reducing Risks:  Reducing Risks Assessed Today: Yes  Diabetes Risks: Ethnicity, Sedentary Lifestyle  Has dilated eye exam at least once a year?: Yes  Sees dentist every 6 months?: No    Healthy Coping:  Emotional response to diabetes: Ready to learn  Stage of change: PREPARATION (Decided to change - considering how)  Patient Activation Measure Survey Score:       No data to display                  Care Plan and Education Provided:  Healthy Eating: Portion control, Being Active: Finding a physical activity routine that works for " you, Monitoring: Frequency of monitoring, Individual glucose targets, and Log and interpret results, and Problem Solving: Low glucose - causes, signs/symptoms, treatment and prevention        Time Spent: 19 minutes  Encounter Type: Individual    Any diabetes medication dose changes were made via the CDE Protocol per the patient's primary care provider. A copy of this encounter was shared with the provider.

## 2024-05-23 NOTE — PATIENT INSTRUCTIONS
Continue to have just one small bowl or rice per meal. You can add more vegetables, and lean meat.   Test blood sugar in the morning before eating, with the goal to be under 130 and two hours after a meal, with the goal to be under 180.  Add in 10-15 minutes of exercise after meals: walking or play with your children, or walk in your home.   Schedule a dental cleaning appointment  If you have fruit, have it as a snack, not with your rice meal.   Limit corn to two ears per meal, no rice. More vegetables.

## 2024-05-23 NOTE — LETTER
5/23/2024         RE: Carlos Ivan  [address]  [place] [address]        Dear Colleague,    Thank you for referring your patient, Carlos Ivan, to the [facility]. Please see a copy of my visit note below.    Diabetes Self-Management Education & Support/ 19 minutes through professional  [id number]    Presents for: Follow-up    Type of Service: Telephone Visit    Originating Location (Patient Location): Home  Distant Location (Provider Location): [facility]  Mode of Communication:  Telephone    Telephone Visit Start Time:  1p  Telephone Visit End Time (telephone visit stop time): 1:19    How would patient like to obtain AVS? MyChart      ASSESSMENT:  Carlos has been checking her BG two times daily at home, FBS and PP. BG readings are not meeting glycemic goals. She is taking Metformin 2000 mg/day. Carlos has decreased her rice portions to one small bowl/meal, she rarely snacks and has not had corn since our last conversation. She tried walking after meals, but felt like it made her shaky. She checked her BG and it was in the low 100's. We reviewed s/s of hypoglycemia and treatment. We discussed the need to add in an additional medication to meet glycemic goals. Carlos does not want to do an injectable medication, but she will take another oral medication. She reported that she is currently not breastfeeding.     Patient's most recent   Lab Results   Component Value Date    A1C 9.5 03/04/2024     is not meeting goal of <8.0    Diabetes knowledge and skills assessment:   Patient is knowledgeable in diabetes management concepts related to: Healthy Eating, Monitoring, and Taking Medication    Continue education with the following diabetes management concepts: Taking Medication    Based on learning assessment above, most appropriate setting for further diabetes education would be: Individual setting.      PLAN  Continue to have just one small bowl or rice per  "meal. You can add more vegetables, and lean meat.   Test blood sugar in the morning before eating, with the goal to be under 130 and two hours after a meal, with the goal to be under 180.  Add in 10-15 minutes of exercise after meals: walking or play with your children, or walk in your home.   Schedule a dental cleaning appointment  If you have fruit, have it as a snack, not with your rice meal.   Limit corn to two ears per meal, no rice. More vegetables.    Topics to cover at upcoming visits: Taking Medication    Follow-up: TBD when a new medication is started    See Care Plan for co-developed, patient-state behavior change goals.  AVS provided for patient today.    Education Materials Provided:  No new materials provided today      SUBJECTIVE/OBJECTIVE:  Presents for: Follow-up  Accompanied by: Self,   Diabetes education in the past 24mo: Yes  Focus of Visit: Monitoring, Reducing Risks, Taking Medication, Healthy Eating  Diabetes type: Type 2  Date of diagnosis: 3/4/24  Disease course: Stable  Other concerns:: English as a second language  Cultural Influences/Ethnic Background:  Not  or       Diabetes Symptoms & Complications:  Diabetes Related Symptoms: Polydipsia (increased thirst), Neuropathy (eyelids want to close after meals,)  Disease course: Stable       Patient Problem List and Family Medical History reviewed for relevant medical history, current medical status, and diabetes risk factors.    Vitals:  There were no vitals taken for this visit.  Estimated body mass index is 30.8 kg/m  as calculated from the following:    Height as of 4/4/24: 1.535 m (5' 0.43\").    Weight as of 5/2/24: 72.6 kg (160 lb).   Last 3 BP:   BP Readings from Last 3 Encounters:   04/04/24 112/74   03/04/24 99/71   02/12/24 123/81       History   Smoking Status     Never   Smokeless Tobacco     Current     Types: Chew       Labs:  Lab Results   Component Value Date    A1C 9.5 03/04/2024     Lab Results " "  Component Value Date     04/04/2024     02/08/2021     Lab Results   Component Value Date     04/04/2024     Direct Measure HDL   Date Value Ref Range Status   04/04/2024 37 (L) >=50 mg/dL Final   ]  GFR Estimate   Date Value Ref Range Status   03/04/2024 >90 >60 mL/min/1.73m2 Final   02/08/2021 >60 >60 mL/min/1.73m2 Final     GFR Estimate If Black   Date Value Ref Range Status   02/08/2021 >60 >60 mL/min/1.73m2 Final     Lab Results   Component Value Date    CR 0.64 03/04/2024     No results found for: \"MICROALBUMIN\"    Healthy Eating:  Healthy Eating Assessed Today: Yes  Meals include: Breakfast, Dinner  Breakfast: mostly rice, vegetables, some meat  Dinner: similiar to morning meal  Other: Carlos has cut back on her rice portions.  Beverages: Water (broth)  Has patient met with a dietitian in the past?: Yes    Being Active:  Being Active Assessed Today: Yes (if she feels energized, she will play with her kids)  Exercise:: Currently not exercising    Monitoring:  Monitoring Assessed Today: Yes (patient instructed on accu check guide me: [id number])  Blood Glucose Meter: Unknown  Times checking blood sugar at home (number): 2    FBS:147,130,127,131,115,134  PP:252,272,121,210,225    Taking Medications:  Diabetes Medication(s)       Biguanides       metFORMIN (GLUCOPHAGE XR) 500 MG 24 hr tablet Take 2 tablets (1,000 mg) by mouth 2 times daily (with meals)            Taking Medication Assessed Today: Yes  Current Treatments: Oral Medication (taken by mouth)  Problems taking diabetes medications regularly?: No    Problem Solving:  Problem Solving Assessed Today: Yes  Is the patient at risk for hypoglycemia?: No              Reducing Risks:  Reducing Risks Assessed Today: Yes  Diabetes Risks: Ethnicity, Sedentary Lifestyle  Has dilated eye exam at least once a year?: Yes  Sees dentist every 6 months?: No    Healthy Coping:  Emotional response to diabetes: Ready to learn  Stage of change: PREPARATION " (Decided to change - considering how)  Patient Activation Measure Survey Score:       No data to display                  Care Plan and Education Provided:  Healthy Eating: Portion control, Being Active: Finding a physical activity routine that works for you, Monitoring: Frequency of monitoring, Individual glucose targets, and Log and interpret results, and Problem Solving: Low glucose - causes, signs/symptoms, treatment and prevention        Time Spent: 19 minutes  Encounter Type: Individual    Any diabetes medication dose changes were made via the CDE Protocol per the patient's primary care provider. A copy of this encounter was shared with the provider.

## 2024-05-23 NOTE — Clinical Note
Dr. Lindo, I had a visit with [facility] today. FBS: 147, 130, 127, 131, 115, 134.  PP: 252, 272, 121, 210, 225. She has reduced her rice portions and is working on activity after meals. She is currently taking metformin 2000mg/day. I asked if she was still breastfeeding or planning a pregnancy as we consider an additional medication. She said she was no longer breast feeding and she was using birth control. She is opposed to using an injectable medication. Per your discretion we could start a low dose sulfonylurea?   Thank you, Alie Anglin

## 2024-05-28 ENCOUNTER — TELEPHONE (OUTPATIENT)
Dept: FAMILY MEDICINE | Facility: CLINIC | Age: 34
End: 2024-05-28
Payer: COMMERCIAL

## 2024-05-28 DIAGNOSIS — E11.9 TYPE 2 DIABETES MELLITUS WITHOUT COMPLICATION, WITHOUT LONG-TERM CURRENT USE OF INSULIN (H): Primary | ICD-10-CM

## 2024-05-28 NOTE — TELEPHONE ENCOUNTER
I received a message from diabetes educator that fasting blood sugars are high on max dose metformin and she won't use an injectable med.  Is no longer breastfeeding.  Will rx Rybelsus.    Type 2 diabetes mellitus without complication, without long-term current use of insulin (H)  -     Semaglutide (RYBELSUS) 3 MG tablet; Take 3 mg daily for 1 month, then increase to 7 mg daily.  -     Semaglutide (RYBELSUS) 7 MG tablet; Take 7 mg daily for 1 month, then increase to 14 mg daily.  -     Semaglutide (RYBELSUS) 14 MG tablet; Take 14 mg by mouth daily

## 2024-06-03 NOTE — TELEPHONE ENCOUNTER
Received notificantion that Rybelsus not convered by insurance.  Will rx Jardiance instead.    Diagnoses and all orders for this visit:    Type 2 diabetes mellitus without complication, without long-term current use of insulin (H)  -     empagliflozin (JARDIANCE) 25 MG TABS tablet; Take 1 tablet (25 mg) by mouth daily

## 2024-07-08 ENCOUNTER — TELEPHONE (OUTPATIENT)
Dept: FAMILY MEDICINE | Facility: CLINIC | Age: 34
End: 2024-07-08
Payer: COMMERCIAL

## 2024-07-08 DIAGNOSIS — E11.9 TYPE 2 DIABETES MELLITUS WITHOUT COMPLICATION, WITHOUT LONG-TERM CURRENT USE OF INSULIN (H): ICD-10-CM

## 2024-07-08 RX ORDER — EMPAGLIFLOZIN 25 MG/1
25 TABLET, FILM COATED ORAL DAILY
Qty: 30 TABLET | Refills: 3 | OUTPATIENT
Start: 2024-07-08

## 2024-07-08 NOTE — TELEPHONE ENCOUNTER
Pharmacy requested refills that are already active on file. Refused request to pharmacy.   Spoke to patient's wife regarding cardiac rehab referral. Pt is aware of the frequency/duration/length of sessions. Pt also aware of the current wait list but wife reports he is still being interested in the program at this time. Submitted to pre-service today for insurance authorization. Pt will be notified as soon as availability opens. Wife verbalized understanding. All questions answered.

## 2024-07-08 NOTE — TELEPHONE ENCOUNTER
Medication Question or Refill    Contacts       Contact Date/Time Type Contact Phone/Fax    07/08/2024 11:12 AM CDT Phone (Incoming) Vivian (Other) [phone]     [facility]            What medication are you calling about (include dose and sig)?: metFORMIN (GLUCOPHAGE XR) 500 MG 24 hr tablet     Preferred Pharmacy:   [address] [address]  Phone: [phone] Fax: [phone]    Controlled Substance Agreement on file:   CSA -- Patient Level:    CSA: None found at the patient level.       Who prescribed the medication?: PCP    Do you need a refill? No    When did you use the medication last?     Patient offered an appointment? No    Do you have any questions or concerns?  Yes: Caller is asking if Pt still needs to continue on Metformin since se will be starting the Jardiance (pharmacy will fill today)? Please call Pt back or Vivian with additional questions at [phone].      Could we send this information to you in [employer] or would you prefer to receive a phone call?:   Patient would prefer a phone call   Okay to leave a detailed message?: Yes at Home number on file [phone] (home)

## 2024-07-09 NOTE — TELEPHONE ENCOUNTER
Will route encounter to provider for a recommendation.  Per chart reviewed from MTM visit from 5/23/24, it was recommended another oral medication to be added, but without specific.     5/23/2024  ASSESSMENT:  Carlos has been checking her BG two times daily at home, FBS and PP. BG readings are not meeting glycemic goals. She is taking Metformin 2000 mg/day. Carlos has decreased her rice portions to one small bowl/meal, she rarely snacks and has not had corn since our last conversation. She tried walking after meals, but felt like it made her shaky. She checked her BG and it was in the low 100's. We reviewed s/s of hypoglycemia and treatment. We discussed the need to add in an additional medication to meet glycemic goals. Carlos does not want to do an injectable medication, but she will take another oral medication. She reported that she is currently not breastfeeding.      Please refer to encounter from [facility] dated 7/8/24,  for clarification whether patient should be on two different oral medications to manage diabetes.      Carlo Ramirez RN  [facility]

## 2024-07-10 NOTE — TELEPHONE ENCOUNTER
Writer called patient with [employer]  ID# [id number]:  -Spoke with patient and reviewed message per Dr. Lindo    Patient verbalized understanding and in agreement with plan.    Patient informed writer her insurance does not cover Metformin or Jardiance.  Patient stated she does not have either medication; pharmacy told her it is as if two claims are being processed for these medications.    Writer informed patient writer will call [facility] to clarify what the insurance issue is.    Writer called [facility] and spoke with Pablito.      Writer asked Pablito if there is an insurance rejection/issue with refilling both Metformin and Jardiance.    Pablito informed writer:  Both medications go through insurance without a problem    Writer requested both medications be refilled for patient.    Pablito verbalized understanding and in agreement with plan.    Writer called patient with [employer]  ID# [id number]:  Informed patient the information shared with writer by [facility]    Patient verbalized understanding and in agreement with plan.    APRIL PimentelN, RN-[facility]

## 2024-07-11 ENCOUNTER — TELEPHONE (OUTPATIENT)
Dept: FAMILY MEDICINE | Facility: CLINIC | Age: 34
End: 2024-07-11
Payer: COMMERCIAL

## 2024-07-12 NOTE — TELEPHONE ENCOUNTER
Called pt and relayed message. Pt verbalized understanding.      Dawit Dawson, BSN RN  [facility]

## 2024-07-13 ENCOUNTER — HEALTH MAINTENANCE LETTER (OUTPATIENT)
Age: 34
End: 2024-07-13

## 2024-07-31 ENCOUNTER — OFFICE VISIT (OUTPATIENT)
Dept: FAMILY MEDICINE | Facility: CLINIC | Age: 34
End: 2024-07-31
Payer: COMMERCIAL

## 2024-07-31 VITALS
RESPIRATION RATE: 20 BRPM | HEART RATE: 66 BPM | HEIGHT: 61 IN | DIASTOLIC BLOOD PRESSURE: 75 MMHG | OXYGEN SATURATION: 98 % | SYSTOLIC BLOOD PRESSURE: 108 MMHG | TEMPERATURE: 98 F | WEIGHT: 157 LBS | BODY MASS INDEX: 29.64 KG/M2

## 2024-07-31 DIAGNOSIS — R51.9 CHRONIC DAILY HEADACHE: ICD-10-CM

## 2024-07-31 DIAGNOSIS — Z00.00 ROUTINE GENERAL MEDICAL EXAMINATION AT A HEALTH CARE FACILITY: Primary | ICD-10-CM

## 2024-07-31 DIAGNOSIS — E11.9 TYPE 2 DIABETES MELLITUS WITHOUT COMPLICATION, WITHOUT LONG-TERM CURRENT USE OF INSULIN (H): ICD-10-CM

## 2024-07-31 PROBLEM — J45.20 MILD INTERMITTENT ASTHMA WITHOUT COMPLICATION: Status: ACTIVE | Noted: 2017-01-31

## 2024-07-31 PROBLEM — O99.019 ANEMIA DURING PREGNANCY: Status: RESOLVED | Noted: 2023-01-17 | Resolved: 2024-07-31

## 2024-07-31 PROCEDURE — 99214 OFFICE O/P EST MOD 30 MIN: CPT | Mod: 25 | Performed by: FAMILY MEDICINE

## 2024-07-31 PROCEDURE — 99395 PREV VISIT EST AGE 18-39: CPT | Performed by: FAMILY MEDICINE

## 2024-07-31 ASSESSMENT — ASTHMA QUESTIONNAIRES: ACT_TOTALSCORE: 25

## 2024-07-31 NOTE — PATIENT INSTRUCTIONS
Patient Education   You have been provided the Preventive Care Advice document.    Additional copies can be found here: [email or web address]  Preventive Care Advice   This is general advice given by our system to help you stay healthy. However, your care team may have specific advice just for you. Please talk to your care team about your preventive care needs.  Nutrition  Eat 5 or more servings of fruits and vegetables each day.  Try wheat bread, brown rice and whole grain pasta (instead of white bread, rice, and pasta).  Get enough calcium and vitamin D. Check the label on foods and aim for 100% of the RDA (recommended daily allowance).  Lifestyle  Exercise at least 150 minutes each week  (30 minutes a day, 5 days a week).  Do muscle strengthening activities 2 days a week. These help control your weight and prevent disease.  No smoking.  Wear sunscreen to prevent skin cancer.  Have a dental exam and cleaning every 6 months.  Yearly exams  See your health care team every year to talk about:  Any changes in your health.  Any medicines your care team has prescribed.  Preventive care, family planning, and ways to prevent chronic diseases.  Shots (vaccines)   HPV shots (up to age 26), if you've never had them before.  Hepatitis B shots (up to age 59), if you've never had them before.  COVID-19 shot: Get this shot when it's due.  Flu shot: Get a flu shot every year.  Tetanus shot: Get a tetanus shot every 10 years.  Pneumococcal, hepatitis A, and RSV shots: Ask your care team if you need these based on your risk.  Shingles shot (for age 50 and up)  General health tests  Diabetes screening:  Starting at age 35, Get screened for diabetes at least every 3 years.  If you are younger than age 35, ask your care team if you should be screened for diabetes.  Cholesterol test: At age 39, start having a cholesterol test every 5 years, or more often if advised.  Bone density scan (DEXA): At age 50, ask your care team if you  should have this scan for osteoporosis (brittle bones).  Hepatitis C: Get tested at least once in your life.  STIs (sexually transmitted infections)  Before age 24: Ask your care team if you should be screened for STIs.  After age 24: Get screened for STIs if you're at risk. You are at risk for STIs (including HIV) if:  You are sexually active with more than one person.  You don't use condoms every time.  You or a partner was diagnosed with a sexually transmitted infection.  If you are at risk for HIV, ask about PrEP medicine to prevent HIV.  Get tested for HIV at least once in your life, whether you are at risk for HIV or not.  Cancer screening tests  Cervical cancer screening: If you have a cervix, begin getting regular cervical cancer screening tests starting at age 21.  Breast cancer scan (mammogram): If you've ever had breasts, begin having regular mammograms starting at age 40. This is a scan to check for breast cancer.  Colon cancer screening: It is important to start screening for colon cancer at age 45.  Have a colonoscopy test every 10 years (or more often if you're at risk) Or, ask your provider about stool tests like a FIT test every year or Cologuard test every 3 years.  To learn more about your testing options, visit:   .  For help making a decision, visit:   https://bit.ly/be30351.  Prostate cancer screening test: If you have a prostate, ask your care team if a prostate cancer screening test (PSA) at age 55 is right for you.  Lung cancer screening: If you are a current or former smoker ages 50 to 80, ask your care team if ongoing lung cancer screenings are right for you.  For informational purposes only. Not to replace the advice of your health care provider. Copyright   2023 [employer]. All rights reserved. Clinically reviewed by the [facility] Transitions Program. Telepo [id number] - REV 01/24.

## 2024-07-31 NOTE — PROGRESS NOTES
Preventive Care Visit  [facility] CAMILA Lindo MD, Family Medicine  Jul 31, 2024      Carlos was seen today for physical and diabetes.    Diagnoses and all orders for this visit:    Routine general medical examination at a health care facility    Type 2 diabetes mellitus without complication, without long-term current use of insulin (H)  Patient had her relatively recent diagnosis of type 2 diabetes on 3/4/2024 after presenting for migraines and hair loss, and coincidentally had blood sugar of 210 with subsequent A1c of 9.4%.  She was started on metformin initially, and more recently Jardiance.  The Jardiance was prescribed back in June but she had some trouble getting it picked up from the pharmacy due to some confusion and is now only been on it for couple of weeks.  Since she just initiated this, I did not yet check an A1c today.  However, we will get her into diabetes education in the next month or so and they could potentially get it at that time.  -     Adult Diabetes Education  Referral; Future    Chronic daily headache  Patient describes a heaviness in the head mostly in the mornings but sometimes starting in the morning lasting all day.  Given that this discomfort tends to be unilateral, I am suspicious that it might be a low-level migraine or migraine equivalent.  Headache from neck problems is also a possibility.  We discussed some nonmedical approaches such as increasing hydration, supportive pillow, stretching the neck, starting medication, or trial of physical therapy.  Patient would like to try some conservative measures first and she will let me know in the future if she wants to start any medications.  Given relatively recent hemogram and CMP, I did not think it was likely that anemia, kidney disease, or liver disease would be contributing and so did not repeat such labs.    Other orders  -     PRIMARY CARE FOLLOW-UP SCHEDULING; Future  -     PRIMARY CARE FOLLOW-UP  SCHEDULING; Future         Subjective   Carlos is a 33 year old, presenting for the following:  Physical and Diabetes        7/31/2024     8:11 AM   Additional Questions   Roomed by KAMERON ARREDONDO MA   Accompanied by DAUGHTER AND SON          HPI    Wakes up with heavy feeling head, not quite headache,   Top of head, one side at a time.  6-12 months.  Doesn't matter whether she sleeps well or not.  Sometimes stays all day, sometimes goes away within a couple of hours.  Doesn't snore.  Sometimes pressure on neck.  Not sure whether it correlates with taking medications.    Forgot to bring meds with her.  Forgetful nowadays in general.    Diabetes.  Didn't bring meds with her, but started jardiance (in addition to metformin) recently.  Took a while to get it going due to some insurance.  Yesterday blood sugar was 121.  2 hours postrprandial about 241.    Previously prescribed flovent for asthma.  Notes not taking for along time and asthma is fine.                    7/31/2024   General Health   How would you rate your overall physical health? (!) FAIR            7/31/2024   Nutrition   Three or more servings of calcium each day? Yes   Diet: Diabetic   How many servings of fruit and vegetables per day? (!) 2-3   How many sweetened beverages each day? 0-1             No data to display                  7/31/2024   Social Factors   Worry food won't last until get money to buy more No   Food not last or not have enough money for food? No   Do you have housing? (Housing is defined as stable permanent housing and does not include staying ouside in a car, in a tent, in an abandoned building, in an overnight shelter, or couch-surfing.) Yes   Are you worried about losing your housing? No   Lack of transportation? No   Unable to get utilities (heat,electricity)? No            7/31/2024   Dental   Dentist two times every year? (!) NO            7/31/2024   TB Screening   Were you born outside of the [place]? Yes              Today's PHQ-2 Score:        2024     8:21 AM   PHQ-2 (  [employer])   Q1: Little interest or pleasure in doing things 0   Q2: Feeling down, depressed or hopeless 0   PHQ-2 Score 0   Q1: Little interest or pleasure in doing things Not at all   Q2: Feeling down, depressed or hopeless Not at all   PHQ-2 Score 0         2024   Substance Use   Alcohol more than 3/day or more than 7/wk No   Do you use any other substances recreationally? No        Social History     Tobacco Use    Smoking status: Never     Passive exposure: Current    Smokeless tobacco: Current     Types: Chew    Tobacco comments:     pt chews bitt[place]ut with tobacco in it for more than 10 years.  smokes outside.   Vaping Use    Vaping status: Never Used   Substance Use Topics    Alcohol use: Not Currently     Comment: once in a while    Drug use: Never          Mammogram Screening - Patient under 40 years of age: Routine Mammogram Screening not recommended.         2024   STI Screening   New sexual partner(s) since last STI/HIV test? No        History of abnormal Pap smear: No - age 30-64 HPV with reflex Pap every 5 years recommended        Latest Ref Rng & Units 2022     4:57 PM   PAP / HPV   PAP  Negative for Intraepithelial Lesion or Malignancy (NILM)    HPV 16 DNA Negative Negative    HPV 18 DNA Negative Negative    Other HR HPV Negative Negative            2024   Contraception/Family Planning   Questions about contraception or family planning No           Reviewed and updated as needed this visit by Provider         Hiro Perkins            Past Medical History:   Diagnosis Date    Implantable subdermal contraceptive surveillance 2019    Uncomplicated asthma      No past surgical history on file.  OB History    Para Term  AB Living   4 4 4 0 0 3   SAB IAB Ectopic Multiple Live Births   0 0 0 0 3      # Outcome Date GA Lbr Raz/2nd Weight Sex Type Anes PTL Lv   4 Term 23 41w0d 08:20 / 00:26 3.78 kg (8 lb 5.3 oz) M  Vag-Spont IV, EPI N HENRIK      Name: Braulio Gonzalez [place]      Apgar1: 8  Apgar5: 9   3 Term 06/09/16 38w6d  2.858 kg (6 lb 4.8 oz)  Vag-Spont  N FD      Birth Comments: Preg had been complicated by single umbulical artery and polyhydramnios (HTERESE 50).      Name: Evonne Gonzalez      Apgar1: 0  Apgar5: 0   2 Term 07/29/12 41w0d  3 kg (6 lb 9.8 oz) F  None N HENRIK      Name: Dawit Gonzalez   1 Term 03/27/09 40w0d  2.8 kg (6 lb 2.8 oz) F  None N HENRIK      Name: Dawit Gonzalez     BP Readings from Last 3 Encounters:   07/31/24 108/75   04/04/24 112/74   03/04/24 99/71    Wt Readings from Last 3 Encounters:   07/31/24 71.2 kg (157 lb)   05/02/24 72.6 kg (160 lb)   04/04/24 70.3 kg (155 lb)                  Patient Active Problem List   Diagnosis    Betel deposit on teeth    History of stillbirth    Mild intermittent asthma without complication    Lactating mother    Diabetes mellitus, type 2 (H)    Vitamin D deficiency     No past surgical history on file.    Social History     Tobacco Use    Smoking status: Never     Passive exposure: Current    Smokeless tobacco: Current     Types: Chew    Tobacco comments:     pt chews b[place] with tobacco in it for more than 10 years.  smokes outside.   Substance Use Topics    Alcohol use: Not Currently     Comment: once in a while     Family History   Problem Relation Age of Onset    Asthma Mother     Other - See Comments Mother         unknown cause of death; was having trouble breathing    Migraines Sister     Migraines Brother     No Known Problems Daughter     Asthma Daughter     Stillborn Daughter     No Known Problems Son          Current Outpatient Medications   Medication Sig Dispense Refill    ACCU-CHEK GUIDE test strip Use to test blood sugar two times daily. 100 strip 6    blood glucose monitoring (SOFTCLIX) lancets Use to test blood sugar two times daily. 100 each 4    emollient (VANICREAM) external cream Apply topically 2 times daily 453 g 1    empagliflozin (JARDIANCE)  "25 MG TABS tablet Take 1 tablet (25 mg) by mouth daily 30 tablet 3    etonogestrel (NEXPLANON) 68 MG IMPL 1 each (68 mg) by Subdermal route once      ibuprofen (ADVIL/MOTRIN) 600 MG tablet Take 1 tablet (600 mg) by mouth every 8 hours as needed for moderate pain 50 tablet 1    metFORMIN (GLUCOPHAGE XR) 500 MG 24 hr tablet Take 2 tablets (1,000 mg) by mouth 2 times daily (with meals) 360 tablet 3    triamcinolone (KENALOG) 0.1 % external cream Apply topically 2 times daily 30 g 1    albuterol (PROAIR HFA/PROVENTIL HFA/VENTOLIN HFA) 108 (90 Base) MCG/ACT inhaler Inhale 2 puffs into the lungs every 4 hours as needed for shortness of breath, wheezing or cough (Patient not taking: Reported on 7/31/2024) 18 g 1     No Known Allergies  Recent Labs   Lab Test 04/04/24  0912 03/04/24  0854 01/31/23  0031 02/08/21  0856   A1C  --  9.5*  --   --    *  --   --   --    HDL 37*  --   --   --    TRIG 126  --   --   --    * 157* 17  --    CR  --  0.64  --  0.72   GFRESTIMATED  --  >90  --  >60   GFRESTBLACK  --   --   --  >60   POTASSIUM  --  4.4  --  4.6   TSH  --  1.77  --   --              Objective    Exam  /75   Pulse 66   Temp 98  F (36.7  C) (Oral)   Resp 20   Ht 1.549 m (5' 1\")   Wt 71.2 kg (157 lb)   SpO2 98%   BMI 29.66 kg/m     Estimated body mass index is 29.66 kg/m  as calculated from the following:    Height as of this encounter: 1.549 m (5' 1\").    Weight as of this encounter: 71.2 kg (157 lb).    Physical Exam  GENERAL: alert and no distress  EYES: Eyes grossly normal to inspection, PERRL and conjunctivae and sclerae normal  HENT: ear canals and TM's normal, nose and mouth without ulcers or lesions  NECK: no adenopathy, no asymmetry, masses, or scars  RESP: lungs clear to auscultation - no rales, rhonchi or wheezes  CV: regular rate and rhythm, normal S1 S2, no S3 or S4, no murmur, click or rub, no peripheral edema  ABDOMEN: soft, nontender, no hepatosplenomegaly, no masses and bowel " sounds normal  MS: no gross musculoskeletal defects noted, no edema  SKIN: no suspicious lesions or rashes  NEURO: Normal strength and tone, mentation intact and speech normal  PSYCH: mentation appears normal, affect normal/bright        Signed Electronically by: Tosin Lindo MD

## 2024-08-07 ENCOUNTER — VIRTUAL VISIT (OUTPATIENT)
Dept: EDUCATION SERVICES | Facility: CLINIC | Age: 34
End: 2024-08-07
Payer: COMMERCIAL

## 2024-08-07 DIAGNOSIS — E11.9 DIABETES MELLITUS (H): Primary | ICD-10-CM

## 2024-08-07 PROCEDURE — G0108 DIAB MANAGE TRN  PER INDIV: HCPCS | Mod: 93 | Performed by: FAMILY MEDICINE

## 2024-08-07 NOTE — PATIENT INSTRUCTIONS
Patient Instructions:    Continue to have just one small bowl/cup  or rice per meal. Add in more vegetables, and lean meat/protein  Test blood sugar in the morning before eating, with the goal to be under 130 and two hours after a meal, with the goal to be under 180.  Add in 10 -15 minutes of exercise/walking/playing with kids after meals: this should help with the post meal meal blood sugars  4.  Please bring your meter and/or BG log to all clinic appointments.    Thanks!  Dina Silverman RDN, MAXIME, [facility]  Diabetes Care and Education  [phone] (Triage)

## 2024-08-07 NOTE — PROGRESS NOTES
Diabetes Self-Management Education & Support    Presents for:      Type of Service: Telephone Visit      ASSESSMENT:  Carlos is due for A1C redraw - we will check at her next appointment on 10/1. Shares that she has been checking her BG two times daily at home, FBS and 2 hours PP. She is not home and is unable to share BG data but reports that most of her FBS are <130, however, she post prandial BS readings are usually >180 and at times >200 as well.     She is taking Metformin 2000 mg/day and Jardiance 25 mg daily - tolerating well.  Occasional missed doses.    Carlos has decreased her rice portions to one small bowl per meal and has added more veg and some protein.    Reports feeling shaky at times.We reviewed s/s of hypoglycemia and treatment.     Activity: walks/plays with kids, also likes to jog at times      Patient's most recent   Lab Results   Component Value Date    A1C 9.5 03/04/2024     is not meeting goal of <8.0    Diabetes knowledge and skills assessment:   Patient is knowledgeable in diabetes management concepts related to: Monitoring    Continue education with the following diabetes management concepts: Problem Solving and Reducing Risks    Based on learning assessment above, most appropriate setting for further diabetes education would be: Individual setting.      PLAN    Continue to have just one small bowl/cup  or rice per meal. Add in more vegetables, and lean meat/protein  Test blood sugar in the morning before eating, with the goal to be under 130 and two hours after a meal, with the goal to be under 180.  Add in 10 -15 minutes of exercise after meals: this should help with the post meal meal blood sugars  4.  Please bring your meter and/or BG log to all clinic appointments.       Topics to cover at upcoming visits: Problem Solving and Reducing Risks    Follow-up: 10/1, we will also draw A1C  PCP:11/13    See Care Plan for co-developed, patient-state behavior change goals.  AVS provided for patient  "today.    SUBJECTIVE/OBJECTIVE:     Cultural Influences/Ethnic Background:  Not  or     Patient Problem List and Family Medical History reviewed for relevant medical history, current medical status, and diabetes risk factors.    Vitals:  There were no vitals taken for this visit.  Estimated body mass index is 29.66 kg/m  as calculated from the following:    Height as of 7/31/24: 1.549 m (5' 1\").    Weight as of 7/31/24: 71.2 kg (157 lb).   Last 3 BP:   BP Readings from Last 3 Encounters:   07/31/24 108/75   04/04/24 112/74   03/04/24 99/71       History   Smoking Status    Never   Smokeless Tobacco    Current    Types: Chew       Labs:  Lab Results   Component Value Date    A1C 9.5 03/04/2024     Lab Results   Component Value Date     04/04/2024     02/08/2021     Lab Results   Component Value Date     04/04/2024     Direct Measure HDL   Date Value Ref Range Status   04/04/2024 37 (L) >=50 mg/dL Final   ]  GFR Estimate   Date Value Ref Range Status   03/04/2024 >90 >60 mL/min/1.73m2 Final   02/08/2021 >60 >60 mL/min/1.73m2 Final     GFR Estimate If Black   Date Value Ref Range Status   02/08/2021 >60 >60 mL/min/1.73m2 Final     Lab Results   Component Value Date    CR 0.64 03/04/2024     No results found for: \"MICROALBUMIN\"    Taking Medications:  Diabetes Medication(s)       Biguanides       metFORMIN (GLUCOPHAGE XR) 500 MG 24 hr tablet Take 2 tablets (1,000 mg) by mouth 2 times daily (with meals)       Sodium-Glucose Co-Transporter 2 (SGLT2) Inhibitors       empagliflozin (JARDIANCE) 25 MG TABS tablet Take 1 tablet (25 mg) by mouth daily             Patient Activation Measure Survey Score:       No data to display                Time Spent: 45 minutes  Encounter Type: Individual    Any diabetes medication dose changes were made via the CDE Protocol per the patient's referring provider. A copy of this encounter was shared with the provider.   "

## 2024-08-07 NOTE — LETTER
8/7/2024         RE: Carlos Ivan  [address]  [facility] [address]        Dear Colleague,    Thank you for referring your patient, Carlos Ivan, to the [facility]. Please see a copy of my visit note below.    Diabetes Self-Management Education & Support    Presents for:      Type of Service: Telephone Visit      ASSESSMENT:  Carlos is due for A1C redraw - we will check at her next appointment on 10/1. Shares that she has been checking her BG two times daily at home, FBS and 2 hours PP. She is not home and is unable to share BG data but reports that most of her FBS are <130, however, she post prandial BS readings are usually >180 and at times >200 as well.     She is taking Metformin 2000 mg/day and Jardiance 25 mg daily - tolerating well.  Occasional missed doses.    Carlos has decreased her rice portions to one small bowl per meal and has added more veg and some protein.    Reports feeling shaky at times.We reviewed s/s of hypoglycemia and treatment.     Activity: walks/plays with kids, also likes to jog at times      Patient's most recent   Lab Results   Component Value Date    A1C 9.5 03/04/2024     is not meeting goal of <8.0    Diabetes knowledge and skills assessment:   Patient is knowledgeable in diabetes management concepts related to: Monitoring    Continue education with the following diabetes management concepts: Problem Solving and Reducing Risks    Based on learning assessment above, most appropriate setting for further diabetes education would be: Individual setting.      PLAN    Continue to have just one small bowl/cup  or rice per meal. Add in more vegetables, and lean meat/protein  Test blood sugar in the morning before eating, with the goal to be under 130 and two hours after a meal, with the goal to be under 180.  Add in 10 -15 minutes of exercise after meals: this should help with the post meal meal blood sugars  4.  Please bring your meter and/or BG log to all  "clinic appointments.       Topics to cover at upcoming visits: Problem Solving and Reducing Risks    Follow-up: 10/1, we will also draw A1C  PCP:11/13    See Care Plan for co-developed, patient-state behavior change goals.  AVS provided for patient today.    SUBJECTIVE/OBJECTIVE:     Cultural Influences/Ethnic Background:  Not  or     Patient Problem List and Family Medical History reviewed for relevant medical history, current medical status, and diabetes risk factors.    Vitals:  There were no vitals taken for this visit.  Estimated body mass index is 29.66 kg/m  as calculated from the following:    Height as of 7/31/24: 1.549 m (5' 1\").    Weight as of 7/31/24: 71.2 kg (157 lb).   Last 3 BP:   BP Readings from Last 3 Encounters:   07/31/24 108/75   04/04/24 112/74   03/04/24 99/71       History   Smoking Status     Never   Smokeless Tobacco     Current     Types: Chew       Labs:  Lab Results   Component Value Date    A1C 9.5 03/04/2024     Lab Results   Component Value Date     04/04/2024     02/08/2021     Lab Results   Component Value Date     04/04/2024     Direct Measure HDL   Date Value Ref Range Status   04/04/2024 37 (L) >=50 mg/dL Final   ]  GFR Estimate   Date Value Ref Range Status   03/04/2024 >90 >60 mL/min/1.73m2 Final   02/08/2021 >60 >60 mL/min/1.73m2 Final     GFR Estimate If Black   Date Value Ref Range Status   02/08/2021 >60 >60 mL/min/1.73m2 Final     Lab Results   Component Value Date    CR 0.64 03/04/2024     No results found for: \"MICROALBUMIN\"    Taking Medications:  Diabetes Medication(s)       Biguanides       metFORMIN (GLUCOPHAGE XR) 500 MG 24 hr tablet Take 2 tablets (1,000 mg) by mouth 2 times daily (with meals)       Sodium-Glucose Co-Transporter 2 (SGLT2) Inhibitors       empagliflozin (JARDIANCE) 25 MG TABS tablet Take 1 tablet (25 mg) by mouth daily             Patient Activation Measure Survey Score:       No data to display          "       Time Spent: 45 minutes  Encounter Type: Individual    Any diabetes medication dose changes were made via the CDE Protocol per the patient's referring provider. A copy of this encounter was shared with the provider.

## 2024-09-17 ENCOUNTER — HOSPITAL ENCOUNTER (EMERGENCY)
Facility: HOSPITAL | Age: 34
Discharge: HOME | End: 2024-09-17
Attending: EMERGENCY MEDICINE | Admitting: EMERGENCY MEDICINE
Payer: COMMERCIAL

## 2024-09-17 ENCOUNTER — APPOINTMENT (OUTPATIENT)
Dept: CT IMAGING | Facility: HOSPITAL | Age: 34
End: 2024-09-17
Payer: COMMERCIAL

## 2024-09-17 VITALS
SYSTOLIC BLOOD PRESSURE: 100 MMHG | OXYGEN SATURATION: 99 % | DIASTOLIC BLOOD PRESSURE: 56 MMHG | TEMPERATURE: 97.3 F | WEIGHT: 152.8 LBS | HEART RATE: 68 BPM | BODY MASS INDEX: 28.85 KG/M2 | RESPIRATION RATE: 20 BRPM | HEIGHT: 61 IN

## 2024-09-17 DIAGNOSIS — R10.84 ABDOMINAL PAIN, GENERALIZED: ICD-10-CM

## 2024-09-17 LAB
ALBUMIN SERPL BCG-MCNC: 4.8 G/DL (ref 3.5–5.2)
ALBUMIN UR-MCNC: 10 MG/DL
ALP SERPL-CCNC: 82 U/L (ref 40–150)
ALT SERPL W P-5'-P-CCNC: 72 U/L (ref 0–50)
ANION GAP SERPL CALCULATED.3IONS-SCNC: 13 MMOL/L (ref 7–15)
APPEARANCE UR: CLEAR
AST SERPL W P-5'-P-CCNC: 28 U/L (ref 0–45)
BASOPHILS # BLD AUTO: 0 10E3/UL (ref 0–0.2)
BASOPHILS NFR BLD AUTO: 0 %
BILIRUB DIRECT SERPL-MCNC: <0.2 MG/DL (ref 0–0.3)
BILIRUB SERPL-MCNC: 0.4 MG/DL
BILIRUB UR QL STRIP: NEGATIVE
BUN SERPL-MCNC: 16.9 MG/DL (ref 6–20)
CALCIUM SERPL-MCNC: 9.3 MG/DL (ref 8.8–10.4)
CHLORIDE SERPL-SCNC: 105 MMOL/L (ref 98–107)
COLOR UR AUTO: ABNORMAL
CREAT SERPL-MCNC: 0.73 MG/DL (ref 0.51–0.95)
EGFRCR SERPLBLD CKD-EPI 2021: >90 ML/MIN/1.73M2
EOSINOPHIL # BLD AUTO: 0.1 10E3/UL (ref 0–0.7)
EOSINOPHIL NFR BLD AUTO: 1 %
ERYTHROCYTE [DISTWIDTH] IN BLOOD BY AUTOMATED COUNT: 13.5 % (ref 10–15)
FLUAV RNA SPEC QL NAA+PROBE: NEGATIVE
FLUBV RNA RESP QL NAA+PROBE: NEGATIVE
GLUCOSE BLDC GLUCOMTR-MCNC: 139 MG/DL (ref 70–99)
GLUCOSE SERPL-MCNC: 142 MG/DL (ref 70–99)
GLUCOSE UR STRIP-MCNC: NEGATIVE MG/DL
HCG INTACT+B SERPL-ACNC: <1 MIU/ML
HCO3 SERPL-SCNC: 22 MMOL/L (ref 22–29)
HCT VFR BLD AUTO: 49 % (ref 35–47)
HGB BLD-MCNC: 15.8 G/DL (ref 11.7–15.7)
HGB UR QL STRIP: ABNORMAL
IMM GRANULOCYTES # BLD: 0.1 10E3/UL
IMM GRANULOCYTES NFR BLD: 0 %
KETONES UR STRIP-MCNC: 10 MG/DL
LEUKOCYTE ESTERASE UR QL STRIP: NEGATIVE
LIPASE SERPL-CCNC: 35 U/L (ref 13–60)
LYMPHOCYTES # BLD AUTO: 0.7 10E3/UL (ref 0.8–5.3)
LYMPHOCYTES NFR BLD AUTO: 5 %
MCH RBC QN AUTO: 26.5 PG (ref 26.5–33)
MCHC RBC AUTO-ENTMCNC: 32.2 G/DL (ref 31.5–36.5)
MCV RBC AUTO: 82 FL (ref 78–100)
MONOCYTES # BLD AUTO: 0.6 10E3/UL (ref 0–1.3)
MONOCYTES NFR BLD AUTO: 4 %
MUCOUS THREADS #/AREA URNS LPF: PRESENT /LPF
NEUTROPHILS # BLD AUTO: 11.9 10E3/UL (ref 1.6–8.3)
NEUTROPHILS NFR BLD AUTO: 90 %
NITRATE UR QL: NEGATIVE
NRBC # BLD AUTO: 0 10E3/UL
NRBC BLD AUTO-RTO: 0 /100
PH UR STRIP: 6.5 [PH] (ref 5–7)
PLATELET # BLD AUTO: 265 10E3/UL (ref 150–450)
POTASSIUM SERPL-SCNC: 3.8 MMOL/L (ref 3.4–5.3)
PROT SERPL-MCNC: 8.6 G/DL (ref 6.4–8.3)
RBC # BLD AUTO: 5.96 10E6/UL (ref 3.8–5.2)
RBC URINE: 1 /HPF
RSV RNA SPEC NAA+PROBE: NEGATIVE
SARS-COV-2 RNA RESP QL NAA+PROBE: NEGATIVE
SODIUM SERPL-SCNC: 140 MMOL/L (ref 135–145)
SP GR UR STRIP: 1.01 (ref 1–1.03)
SQUAMOUS EPITHELIAL: <1 /HPF
UROBILINOGEN UR STRIP-MCNC: <2 MG/DL
WBC # BLD AUTO: 13.2 10E3/UL (ref 4–11)
WBC URINE: 0 /HPF

## 2024-09-17 PROCEDURE — 85025 COMPLETE CBC W/AUTO DIFF WBC: CPT

## 2024-09-17 PROCEDURE — 96360 HYDRATION IV INFUSION INIT: CPT | Mod: 59

## 2024-09-17 PROCEDURE — 36415 COLL VENOUS BLD VENIPUNCTURE: CPT

## 2024-09-17 PROCEDURE — 82962 GLUCOSE BLOOD TEST: CPT

## 2024-09-17 PROCEDURE — 87637 SARSCOV2&INF A&B&RSV AMP PRB: CPT | Performed by: EMERGENCY MEDICINE

## 2024-09-17 PROCEDURE — 80053 COMPREHEN METABOLIC PANEL: CPT

## 2024-09-17 PROCEDURE — 250N000013 HC RX MED GY IP 250 OP 250 PS 637

## 2024-09-17 PROCEDURE — 258N000003 HC RX IP 258 OP 636

## 2024-09-17 PROCEDURE — 81001 URINALYSIS AUTO W/SCOPE: CPT

## 2024-09-17 PROCEDURE — 250N000011 HC RX IP 250 OP 636

## 2024-09-17 PROCEDURE — 99285 EMERGENCY DEPT VISIT HI MDM: CPT | Mod: 25

## 2024-09-17 PROCEDURE — 84702 CHORIONIC GONADOTROPIN TEST: CPT

## 2024-09-17 PROCEDURE — 96361 HYDRATE IV INFUSION ADD-ON: CPT

## 2024-09-17 PROCEDURE — 74177 CT ABD & PELVIS W/CONTRAST: CPT

## 2024-09-17 PROCEDURE — 83690 ASSAY OF LIPASE: CPT

## 2024-09-17 RX ORDER — ACETAMINOPHEN 325 MG/1
650 TABLET ORAL ONCE
Status: COMPLETED | OUTPATIENT
Start: 2024-09-17 | End: 2024-09-17

## 2024-09-17 RX ORDER — IOPAMIDOL 755 MG/ML
75 INJECTION, SOLUTION INTRAVASCULAR ONCE
Status: COMPLETED | OUTPATIENT
Start: 2024-09-17 | End: 2024-09-17

## 2024-09-17 RX ADMIN — IOPAMIDOL 75 ML: 755 INJECTION, SOLUTION INTRAVENOUS at 15:46

## 2024-09-17 RX ADMIN — SODIUM CHLORIDE 1000 ML: 9 INJECTION, SOLUTION INTRAVENOUS at 14:17

## 2024-09-17 RX ADMIN — ACETAMINOPHEN 650 MG: 325 TABLET ORAL at 14:16

## 2024-09-17 ASSESSMENT — COLUMBIA-SUICIDE SEVERITY RATING SCALE - C-SSRS
1. IN THE PAST MONTH, HAVE YOU WISHED YOU WERE DEAD OR WISHED YOU COULD GO TO SLEEP AND NOT WAKE UP?: NO
6. HAVE YOU EVER DONE ANYTHING, STARTED TO DO ANYTHING, OR PREPARED TO DO ANYTHING TO END YOUR LIFE?: NO
2. HAVE YOU ACTUALLY HAD ANY THOUGHTS OF KILLING YOURSELF IN THE PAST MONTH?: NO

## 2024-09-17 ASSESSMENT — ACTIVITIES OF DAILY LIVING (ADL)
ADLS_ACUITY_SCORE: 35

## 2024-09-17 NOTE — Clinical Note
Carlos Ivan was seen and treated in our emergency department on 9/17/2024.  She may return to work on 09/18/2024.       If you have any questions or concerns, please don't hesitate to call.      Charu Callaway, ERIS

## 2024-09-17 NOTE — ED PROVIDER NOTES
EMERGENCY DEPARTMENT ENCOUNTER      NAME: Carlos Ivan  AGE: 33 year old female  YOB: 1990  MRN: [id number]  EVALUATION DATE & TIME: No admission date for patient encounter.    PCP: Tosin Lindo    ED PROVIDER: Charu Callaway PA-C      Chief Complaint   Patient presents with    Abdominal Pain     FINAL IMPRESSION:  1. Abdominal pain, generalized      ED COURSE & MEDICAL DECISION MAKING:    Pertinent Labs & Imaging studies reviewed. (See chart for details)  33 year old female presents to the Emergency Department for evaluation of abdominal pain.  This morning after eating patient suddenly started to have nausea vomiting and bilateral upper quadrant abdominal pain.  Patient is also notes diarrhea.  Patient denies any chance of pregnancy as she just finished her menstrual cycle.  Vital signs reviewed and unremarkable.  Afebrile.  On exam patient is tender to palpation in the right upper and left upper quadrants.  No rash.  Normal rate.  Lungs are clear to auscultation bilaterally. No rash.     Differential diagnosis includes cholecystitis, appendicitis, GERD, gastritis, small bowel obstruction, diverticulitis, appendicitis, colitis, enteritis.  CBC shows a white blood cell count of 13.2.  Hemoglobin is 15.8.  Sodium and potassium is within normal limits.  Anion gap is 13.  Creatinine is 0.73.  Lipase was within normal limits.  Hepatic shows a protein of elevation of 8.6.  ALT was elevated at 72.  Patient is not pregnant.  UA shows no nitrates or leukocyte.,  Influenza, COVID, RSV was negative. CT of the abdomen shows a mild distal esophageal wall thickening correlating for esophagitis or reflux.  Mild liquid colonic stool compatible with a history of diarrhea.  No significant gastric or bowel wall thickening.  No obstruction.  No diverticulitis or appendicitis.  No free fluid of air no abscess.  Hepatic steatosis.  Patient was unable to have a bowel movement while in the emergency room.  Patient was  told if she had a another episode of diarrhea she can bring a sample to her PCP.  Patient received Tylenol and fluids which improved her symptoms completely.  Patient is resting comfortably and was educated on her results. Patient prescribed omeprazole. Patient was educated on results and prescribed medication. Plan will be to discharge the patient.  All questions answered.  Patient agrees with plan.  Patient will follow-up with her primary care doctor discuss her ED visit.  Patient return to the ED if new symptoms develop or symptoms worsen.    ED COURSE:   1:17 PM I saw the patient.   4:18 PM Checked on the patient.   6:41 PM Staffed with Dr. Macias.  8:38 PM educated on her results.  Patient be discharged home.  Patient agrees with plan.  All questions answered     At the conclusion of the encounter I discussed the results of all of the tests and the disposition. The questions were answered. The patient or family acknowledged understanding and was agreeable with the care plan.     0 minutes of critical care time       Medical Decision Making  Obtained supplemental history:Supplemental history obtained?: Documented in chart  Reviewed external records: External records reviewed?: Documented in chart  Care impacted by chronic illness:Diabetes  Care significantly affected by social determinants of health:Access to Affordable Health Care  Did you consider but not order tests?: Work up considered but not performed and documented in chart, if applicable  Did you interpret images independently?: Independent interpretation of ECG and images noted in documentation, when applicable.  Consultation discussion with other provider:Did you involve another provider (consultant, , pharmacy, etc.)?: I discussed the care with another health care provider, see documentation for details.  Discharge. No recommendations on prescription strength medication(s). See documentation for any additional details.    Not Applicable    MEDICATIONS  GIVEN IN THE EMERGENCY:  Medications   sodium chloride 0.9% BOLUS 1,000 mL (0 mLs Intravenous Stopped 9/17/24 1905)   acetaminophen (TYLENOL) tablet 650 mg (650 mg Oral $Given 9/17/24 1416)   iopamidol (ISOVUE-370) solution 75 mL (75 mLs Intravenous $Given 9/17/24 1546)       NEW PRESCRIPTIONS STARTED AT TODAY'S ER VISIT  New Prescriptions    No medications on file      =================================================================    HPI    Patient information was obtained from: Patient    Use of : Yes (Phone) - Language Aura Ivan is a 33 year old female with a pertinent history of TIIDM and mild intermittent asthma, who presents to this ED via walk-in for evaluation of abdominal pain, vomiting, and diarrhea.    Patient reports she ate rice this morning and immediately after around 11 AM, she started vomiting, developed epigastric abdominal pain, and had 1 episodes of diarrhea as well. States other people at her home also ate this rice but they didn't have the same symptoms as her and didn't get sick. The abdominal pain started at her epigastric region and now spread to her entire abdomen diffusely. She denies having lower abdominal pain. This pain is intermittent. She denies having vaginal bleeding or abnormal vaginal discharge. She mentions feeling fatigue now and also has chills. No measured fevers at home. She doesn't endorse chest pain or shortness of breath. She didn't notice any blood in her vomit or diarrhea. She reports a medical history of diabetes and takes metformin to treat this. She denies any chance of pregnancy and notes her last menstrual period was 2 days ago. No other reported complaints or concerns at this time.      REVIEW OF SYSTEMS   As per HPI    PAST MEDICAL HISTORY:  Past Medical History:   Diagnosis Date    Implantable subdermal contraceptive surveillance 07/19/2019    Uncomplicated asthma        PAST SURGICAL HISTORY:  No past surgical history on  file.        CURRENT MEDICATIONS:    ACCU-CHEK GUIDE test strip  albuterol (PROAIR HFA/PROVENTIL HFA/VENTOLIN HFA) 108 (90 Base) MCG/ACT inhaler  blood glucose monitoring (SOFTCLIX) lancets  emollient (VANICREAM) external cream  empagliflozin (JARDIANCE) 25 MG TABS tablet  etonogestrel (NEXPLANON) 68 MG IMPL  ibuprofen (ADVIL/MOTRIN) 600 MG tablet  metFORMIN (GLUCOPHAGE XR) 500 MG 24 hr tablet  triamcinolone (KENALOG) 0.1 % external cream        ALLERGIES:  No Known Allergies    FAMILY HISTORY:  Family History   Problem Relation Age of Onset    Asthma Mother     Other - See Comments Mother         unknown cause of death; was having trouble breathing    Migraines Sister     Migraines Brother     No Known Problems Daughter     Asthma Daughter     Stillborn Daughter     No Known Problems Son        SOCIAL HISTORY:   Social History     Socioeconomic History    Marital status:      Spouse name: Yisel Gillette    Number of children: 3   Tobacco Use    Smoking status: Never     Passive exposure: Current    Smokeless tobacco: Current     Types: Chew    Tobacco comments:     pt chews [place] with tobacco in it for more than 10 years.  smokes outside.   Vaping Use    Vaping status: Never Used   Substance and Sexual Activity    Alcohol use: Not Currently     Comment: once in a while    Drug use: Never    Sexual activity: Yes     Partners: Male     Birth control/protection: Implant     Social Determinants of Health     Financial Resource Strain: Low Risk  (7/31/2024)    Financial Resource Strain     Within the past 12 months, have you or your family members you live with been unable to get utilities (heat, electricity) when it was really needed?: No   Food Insecurity: Low Risk  (7/31/2024)    Food Insecurity     Within the past 12 months, did you worry that your food would run out before you got money to buy more?: No     Within the past 12 months, did the food you bought just not last and you didn t have money to get  "more?: No   Transportation Needs: Low Risk  (7/31/2024)    Transportation Needs     Within the past 12 months, has lack of transportation kept you from medical appointments, getting your medicines, non-medical meetings or appointments, work, or from getting things that you need?: No   Physical Activity: Insufficiently Active (1/19/2023)    Exercise Vital Sign     Days of Exercise per Week: 3 days     Minutes of Exercise per Session: 30 min   Stress: No Stress Concern Present (1/19/2023)    [employer] - Occupational Stress Questionnaire     Feeling of Stress : Only a little   Interpersonal Safety: Low Risk  (2/12/2024)    Interpersonal Safety     Do you feel physically and emotionally safe where you currently live?: Yes     Within the past 12 months, have you been hit, slapped, kicked or otherwise physically hurt by someone?: No     Within the past 12 months, have you been humiliated or emotionally abused in other ways by your partner or ex-partner?: No   Housing Stability: Low Risk  (7/31/2024)    Housing Stability     Do you have housing? : Yes     Are you worried about losing your housing?: No       VITALS:  BP 91/53   Pulse 68   Temp 97.3  F (36.3  C) (Temporal)   Resp 20   Ht 1.549 m (5' 1\")   Wt 69.3 kg (152 lb 12.8 oz)   SpO2 98%   Breastfeeding No   BMI 28.87 kg/m      PHYSICAL EXAM    Physical Exam  Vitals and nursing note reviewed.   Constitutional:       Appearance: Normal appearance.   HENT:      Head: Atraumatic.      Right Ear: External ear normal.      Left Ear: External ear normal.      Nose: Nose normal.      Mouth/Throat:      Mouth: Mucous membranes are moist.   Eyes:      Conjunctiva/sclera: Conjunctivae normal.      Pupils: Pupils are equal, round, and reactive to light.   Cardiovascular:      Rate and Rhythm: Normal rate and regular rhythm.      Pulses: Normal pulses.      Heart sounds: Normal heart sounds. No murmur heard.     No friction rub. No gallop. "   Pulmonary:      Effort: Pulmonary effort is normal.      Breath sounds: Normal breath sounds. No wheezing or rales.   Abdominal:      Tenderness: There is abdominal tenderness in the right upper quadrant and left upper quadrant. There is no guarding or rebound.   Musculoskeletal:      Cervical back: Normal range of motion.   Skin:     General: Skin is dry.   Neurological:      Mental Status: She is alert. Mental status is at baseline.   Psychiatric:         Mood and Affect: Mood normal.         Thought Content: Thought content normal.          LAB:  All pertinent labs reviewed and interpreted.  Labs Ordered and Resulted from Time of ED Arrival to Time of ED Departure   GLUCOSE BY METER - Abnormal       Result Value    GLUCOSE BY METER POCT 139 (*)    BASIC METABOLIC PANEL - Abnormal    Sodium 140      Potassium 3.8      Chloride 105      Carbon Dioxide (CO2) 22      Anion Gap 13      Urea Nitrogen 16.9      Creatinine 0.73      GFR Estimate >90      Calcium 9.3      Glucose 142 (*)    HEPATIC FUNCTION PANEL - Abnormal    Protein Total 8.6 (*)     Albumin 4.8      Bilirubin Total 0.4      Alkaline Phosphatase 82      AST 28      ALT 72 (*)     Bilirubin Direct <0.20     ROUTINE UA WITH MICROSCOPIC REFLEX TO CULTURE - Abnormal    Color Urine Light Yellow      Appearance Urine Clear      Glucose Urine Negative      Bilirubin Urine Negative      Ketones Urine 10 (*)     Specific Gravity Urine 1.010      Blood Urine 0.06 mg/dL (*)     pH Urine 6.5      Protein Albumin Urine 10 (*)     Urobilinogen Urine <2.0      Nitrite Urine Negative      Leukocyte Esterase Urine Negative      Mucus Urine Present (*)     RBC Urine 1      WBC Urine 0      Squamous Epithelials Urine <1     CBC WITH PLATELETS AND DIFFERENTIAL - Abnormal    WBC Count 13.2 (*)     RBC Count 5.96 (*)     Hemoglobin 15.8 (*)     Hematocrit 49.0 (*)     MCV 82      MCH 26.5      MCHC 32.2      RDW 13.5      Platelet Count 265      % Neutrophils 90      %  Lymphocytes 5      % Monocytes 4      % Eosinophils 1      % Basophils 0      % Immature Granulocytes 0      NRBCs per 100 WBC 0      Absolute Neutrophils 11.9 (*)     Absolute Lymphocytes 0.7 (*)     Absolute Monocytes 0.6      Absolute Eosinophils 0.1      Absolute Basophils 0.0      Absolute Immature Granulocytes 0.1      Absolute NRBCs 0.0     LIPASE - Normal    Lipase 35     HCG QUANTITATIVE PREGNANCY - Normal    hCG Quantitative <1     GLUCOSE MONITOR NURSING POCT   INFLUENZA A/B, RSV, & SARS-COV2 PCR   ENTERIC BACTERIA AND VIRUS PANEL BY PCR        RADIOLOGY:  Reviewed all pertinent imaging. Please see official radiology report.  CT Abdomen Pelvis w Contrast   Final Result   IMPRESSION:       1.  Mild distal esophageal wall thickening; correlate for esophagitis or reflux.      2.  Mild liquid colonic stool, compatible with history of diarrhea.      3.  No significant gastric or bowel wall thickening. No obstruction. No diverticulitis or appendicitis.      4.  No free fluid or air. No abscess.      5.  Hepatic steatosis.             I, Cele Richard, am serving as a scribe to document services personally performed by Charu Callaway PA-C, based on my observation and the provider's statements to me. I, Charu Callaway PA-C, attest that Cele Richard is acting in a scribe capacity, has observed my performance of the services and has documented them in accordance with my direction.    Charu Callaway PA-C  [facility] EMERGENCY DEPARTMENT  [address] [place] [id number]  [phone]     Charu Callaway PA-C  09/20/24 112

## 2024-09-17 NOTE — DISCHARGE INSTRUCTIONS
Rest.  Orally hydrate.  Follow-up with your primary care doctor discuss your ED visit.  Return to the ED if new symptoms develop or symptoms worsen.    You were prescribed omeprazole.  Please take for your symptoms.  Please take as prescribed.

## 2024-09-18 NOTE — ED PROVIDER NOTES
"Emergency Department Staff Physician Note     I had a face to face encounter with this patient seen by the Advanced Practice Provider (ROMI).  I have seen, examined, and discussed the patient with the ROMI and agree with their assessment and plan of management.    Relevant HPI:     Carlos Ivan is a 33 year old female who presents to the Emergency Department for evaluation of epigastric abdominal pain and diarrhea.  Patient does report that the pain has become somewhat diffuse as well.  Does report some vomiting.  No blood in the vomit or the diarrhea.  Did feel somewhat weak.  Denies any significant fevers.  Denies additional complaints at this time.      I, Mari Chacon, am serving as a scribe to document services personally performed by Dell Macias D.O., based on my observations and the provider's statements to me.   I, Dell Macias D.O., attest that Kplatha Ines was acting in a scribe capacity, has observed my performance of the services and has documented them in accordance with my direction.    ED Course:  6:30 PM I received the patient report from the ROMI, Charu Callaway PA-C. I agree with their assessment and plan of management, and I will see the patient.  6:35 PM I met with the patient to introduce myself, gather additional history, perform my initial exam, and discuss the plan.     Brief Physical Exam:  VITAL SIGNS: BP 99/66   Pulse 68   Temp 97.3  F (36.3  C) (Temporal)   Resp 20   Ht 1.549 m (5' 1\")   Wt 69.3 kg (152 lb 12.8 oz)   SpO2 97%   Breastfeeding No   BMI 28.87 kg/m      General Appearance: Well-appearing, well-nourished, no acute distress   Head:  Normocephalic, without obvious abnormality, atraumatic  Eyes:  PERRL, conjunctiva/corneas clear, EOM's intact,  ENT:  Lips, mucosa, and tongue normal, membranes are moist without pallor  Neck:  Normal ROM, symmetrical, trachea midline    Cardio:  Regular rate and rhythm, no murmur, rub or gallop, 2+ pulses symmetric in all " extremities  Pulm:  Clear to auscultation bilaterally, respirations unlabored,   Abdomen:  Soft, minimal epigastric pain, no rebound or guarding.  Musculoskeletal: Full ROM, no edema, no cyanosis, good ROM of major joints  Integument:  Warm, Dry, No erythema, No rash.    Neurologic:  Alert & oriented.  No focal deficits appreciated.  Ambulatory.  Psychiatric:  Affect normal, Judgment normal, Mood normal.        LABS  Results for orders placed or performed during the hospital encounter of 09/17/24 (from the past 24 hour(s))   Glucose by meter   Result Value Ref Range    GLUCOSE BY METER POCT 139 (H) 70 - 99 mg/dL   CBC with platelets + differential    Narrative    The following orders were created for panel order CBC with platelets + differential.  Procedure                               Abnormality         Status                     ---------                               -----------         ------                     CBC with platelets and d...[[id number]]  Abnormal            Final result                 Please view results for these tests on the individual orders.   Basic metabolic panel   Result Value Ref Range    Sodium 140 135 - 145 mmol/L    Potassium 3.8 3.4 - 5.3 mmol/L    Chloride 105 98 - 107 mmol/L    Carbon Dioxide (CO2) 22 22 - 29 mmol/L    Anion Gap 13 7 - 15 mmol/L    Urea Nitrogen 16.9 6.0 - 20.0 mg/dL    Creatinine 0.73 0.51 - 0.95 mg/dL    GFR Estimate >90 >60 mL/min/1.73m2    Calcium 9.3 8.8 - 10.4 mg/dL    Glucose 142 (H) 70 - 99 mg/dL   Lipase   Result Value Ref Range    Lipase 35 13 - 60 U/L   Hepatic function panel   Result Value Ref Range    Protein Total 8.6 (H) 6.4 - 8.3 g/dL    Albumin 4.8 3.5 - 5.2 g/dL    Bilirubin Total 0.4 <=1.2 mg/dL    Alkaline Phosphatase 82 40 - 150 U/L    AST 28 0 - 45 U/L    ALT 72 (H) 0 - 50 U/L    Bilirubin Direct <0.20 0.00 - 0.30 mg/dL   HCG quantitative pregnancy   Result Value Ref Range    hCG Quantitative <1 <5 mIU/mL   CBC with platelets and differential    Result Value Ref Range    WBC Count 13.2 (H) 4.0 - 11.0 10e3/uL    RBC Count 5.96 (H) 3.80 - 5.20 10e6/uL    Hemoglobin 15.8 (H) 11.7 - 15.7 g/dL    Hematocrit 49.0 (H) 35.0 - 47.0 %    MCV 82 78 - 100 fL    MCH 26.5 26.5 - 33.0 pg    MCHC 32.2 31.5 - 36.5 g/dL    RDW 13.5 10.0 - 15.0 %    Platelet Count 265 150 - 450 10e3/uL    % Neutrophils 90 %    % Lymphocytes 5 %    % Monocytes 4 %    % Eosinophils 1 %    % Basophils 0 %    % Immature Granulocytes 0 %    NRBCs per 100 WBC 0 <1 /100    Absolute Neutrophils 11.9 (H) 1.6 - 8.3 10e3/uL    Absolute Lymphocytes 0.7 (L) 0.8 - 5.3 10e3/uL    Absolute Monocytes 0.6 0.0 - 1.3 10e3/uL    Absolute Eosinophils 0.1 0.0 - 0.7 10e3/uL    Absolute Basophils 0.0 0.0 - 0.2 10e3/uL    Absolute Immature Granulocytes 0.1 <=0.4 10e3/uL    Absolute NRBCs 0.0 10e3/uL   CT Abdomen Pelvis w Contrast    Narrative    EXAM: CT ABDOMEN PELVIS W CONTRAST  LOCATION: [facility]  DATE: 9/17/2024    INDICATION: Right and left upper quadrant abdominal pain. Nausea, vomiting and diarrhea.  COMPARISON: None.  TECHNIQUE: CT scan of the abdomen and pelvis was performed following injection of IV contrast. Multiplanar reformats were obtained. Dose reduction techniques were used.  CONTRAST: isovue 370 75 ml.    FINDINGS:   LOWER CHEST: Mild distal esophageal wall thickening.    HEPATOBILIARY: Mild hepatic steatosis. Otherwise, unremarkable.    PANCREAS: Normal.    SPLEEN: Normal.    ADRENAL GLANDS: Normal.    KIDNEYS/BLADDER: Foci of contrast excretion are identified involving the bilateral kidneys, though there may be a 3 mm nonobstructing left renal stone (series 3, image 78). Otherwise, negative.    BOWEL: Mild liquid colonic stool. No bowel dilatation or significant wall thickening. Normal appendix.    LYMPH NODES: No adenopathy.    VASCULATURE: Nonaneurysmal aorta.    PELVIC ORGANS: Small physiologic ovarian follicles.    MUSCULOSKELETAL: Nothing acute.      Impression     IMPRESSION:     1.  Mild distal esophageal wall thickening; correlate for esophagitis or reflux.    2.  Mild liquid colonic stool, compatible with history of diarrhea.    3.  No significant gastric or bowel wall thickening. No obstruction. No diverticulitis or appendicitis.    4.  No free fluid or air. No abscess.    5.  Hepatic steatosis.     UA with Microscopic reflex to Culture    Specimen: Urine, Midstream   Result Value Ref Range    Color Urine Light Yellow Colorless, Straw, Light Yellow, Yellow    Appearance Urine Clear Clear    Glucose Urine Negative Negative mg/dL    Bilirubin Urine Negative Negative    Ketones Urine 10 (A) Negative mg/dL    Specific Gravity Urine 1.010 1.001 - 1.030    Blood Urine 0.06 mg/dL (A) Negative    pH Urine 6.5 5.0 - 7.0    Protein Albumin Urine 10 (A) Negative mg/dL    Urobilinogen Urine <2.0 <2.0 mg/dL    Nitrite Urine Negative Negative    Leukocyte Esterase Urine Negative Negative    Mucus Urine Present (A) None Seen /LPF    RBC Urine 1 <=2 /HPF    WBC Urine 0 <=5 /HPF    Squamous Epithelials Urine <1 <=1 /HPF    Narrative    Urine Culture not indicated         RADIOLOGY  CT Abdomen Pelvis w Contrast   Final Result   IMPRESSION:       1.  Mild distal esophageal wall thickening; correlate for esophagitis or reflux.      2.  Mild liquid colonic stool, compatible with history of diarrhea.      3.  No significant gastric or bowel wall thickening. No obstruction. No diverticulitis or appendicitis.      4.  No free fluid or air. No abscess.      5.  Hepatic steatosis.              Impression / ED Plan:  I had a face to face encounter with this patient seen by the Advanced Practice Provider (ROMI). I personally made/approved the management plan and take responsibility for the patient management. I personally saw patient and performed a substantive portion of the visit including all aspects of the medical decision making.     Carlos Ivan is a 33 year old female presents to the ED for  evaluation of epigastric pain that became more generalized.  Initial concern was for obstruction versus volvulus versus mesenteric ischemia versus colitis versus  diverticulitis versus other acute process.  Fortunately lab testing did not show evidence of acute process,  and CT imaging does show evidence of esophagitis, but no other obvious acute process. WBC was elevated however do not believe it of clinical significance with CT results.  No evidence of surgical abdomen, at this time I do not believe she requires admission.  Symptoms have improved significantly in the emergency department with treatment here, therefore I believe she is appropriate for discharge home with outpatient follow-up with primary care provider.  Patient was comfortable this plan.  Return precautions were discussed.    1. Abdominal pain, generalized        Dell Macias D.O.  Emergency Medicine  [facility] EMERGENCY DEPARTMENT  [facility] [facility] [id number] [phone]  Dept: [phone]       Dell Macias DO  09/18/24 0005

## 2024-10-01 ENCOUNTER — NURSE TRIAGE (OUTPATIENT)
Dept: FAMILY MEDICINE | Facility: CLINIC | Age: 34
End: 2024-10-01

## 2024-10-01 ENCOUNTER — LAB (OUTPATIENT)
Dept: LAB | Facility: CLINIC | Age: 34
End: 2024-10-01
Payer: COMMERCIAL

## 2024-10-01 ENCOUNTER — PATIENT OUTREACH (OUTPATIENT)
Dept: CARE COORDINATION | Facility: CLINIC | Age: 34
End: 2024-10-01

## 2024-10-01 ENCOUNTER — ALLIED HEALTH/NURSE VISIT (OUTPATIENT)
Dept: EDUCATION SERVICES | Facility: CLINIC | Age: 34
End: 2024-10-01
Attending: FAMILY MEDICINE
Payer: COMMERCIAL

## 2024-10-01 ENCOUNTER — ALLIED HEALTH/NURSE VISIT (OUTPATIENT)
Dept: FAMILY MEDICINE | Facility: CLINIC | Age: 34
End: 2024-10-01
Payer: COMMERCIAL

## 2024-10-01 DIAGNOSIS — Z71.9 COUNSELED BY NURSE: Primary | ICD-10-CM

## 2024-10-01 DIAGNOSIS — Z71.89 OTHER SPECIFIED COUNSELING: Primary | Chronic | ICD-10-CM

## 2024-10-01 DIAGNOSIS — E11.9 TYPE 2 DIABETES MELLITUS WITHOUT COMPLICATION, WITHOUT LONG-TERM CURRENT USE OF INSULIN (H): ICD-10-CM

## 2024-10-01 DIAGNOSIS — E11.9 TYPE 2 DIABETES MELLITUS WITHOUT COMPLICATION, WITHOUT LONG-TERM CURRENT USE OF INSULIN (H): Primary | ICD-10-CM

## 2024-10-01 LAB
EST. AVERAGE GLUCOSE BLD GHB EST-MCNC: 151 MG/DL
HBA1C MFR BLD: 6.9 % (ref 0–5.6)

## 2024-10-01 PROCEDURE — 36415 COLL VENOUS BLD VENIPUNCTURE: CPT

## 2024-10-01 PROCEDURE — 83036 HEMOGLOBIN GLYCOSYLATED A1C: CPT

## 2024-10-01 PROCEDURE — 99207 PR NO CHARGE NURSE ONLY: CPT

## 2024-10-01 PROCEDURE — G0108 DIAB MANAGE TRN  PER INDIV: HCPCS | Performed by: DIETITIAN, REGISTERED

## 2024-10-01 NOTE — TELEPHONE ENCOUNTER
"Nurse Triage SBAR    Is this a 2nd Level Triage? NO    Situation:   Writer was asked by [facility] Diabetes Educator, Dina to triage patient for reported symptoms of \"rash symptoms.\" Patient saw DM educator for a visit today.    Triaged patient in-person, with the help of telephone Aura chow. Per patient her rash symptoms began for 1 month now.    Background:  All new symptoms per patient.    Assessment:   Two Locations: left side of neck, and lefe opening of arms (near AC lab draw area). Patient has been scratching her rashes.  Size: smaller than a size of quarter on neck, and smaller than a dime on left arm.  Patient has NOT used any cream on location yet.   Rash are itchy, especially when sweating. Rash does not hurt.   Itchiness rated at 5-6/10 today. Rash does interfere with patient's normal activities.  Patient says, \"My  son has the same thing like me. My son never saw the doctor for the rash symptoms either. He has an appointment to see the doctor in November.\"  Patient declines having fever, no chest pain, no difficulty breathing.  No chance of pregnancy.   No recent exposure to anyone with Mpox that patient knows of.  The rash is getting a little bit larger than one it initially started.    Patient wants to be seen sooner.    Protocol Recommended Disposition:   See in Office within 3 Days    Recommendation:   Care Advice given to patient.  Patient declines to see PCP sooner for reported symptoms. Patient only wishes to see PCP at schedule appointment. Explained to patient that based on what was discussed, patient should be seen sooner. If patient wishes to be seen later with PCP that is fine too. Patient then agrees to schedule a visit to see a different provider.    Assisted in scheduling patient for:    Oct 03, 2024 9:20 AM  (Arrive by 9:00 AM)  Provider Visit with Monisha Holcomb MD  [facility] ([facility] ) [phone]     Patient verbalizes " understanding, agrees with plan and has no further questions.    Will NOT route to provider, as patient was able to obtain an appointment.    Does the patient meet one of the following criteria for ADS visit consideration? No    SABAS Alexander, RN   [facility]    Reason for Disposition   Localized rash present > 7 days    Additional Information   Negative: Sounds like a life-threatening emergency to the triager   Negative: Athlete's Foot suspected (i.e., itchy rash between the toes)   Negative: Insect bite(s) suspected   Negative: Jock Itch suspected (i.e., itchy rash on inner thighs near genital area)   Negative: Localized lump (or swelling) without redness or rash   Negative: MPOX SUSPECTED (e.g., direct skin contact such as sex, recent travel to [place] or [place]) and any SYMPTOMS OF MPOX (e.g., rash, fever, muscle aches, or swollen lymph nodes)   Negative: At risk for Mpox (men-who-have-sex-with-men) and possible exposure (e.g., multiple sex partners in past 21 days) and ANY SYMPTOMS OF MPOX (e.g., rash, fever, muscle aches, or swollen lymph nodes)   Negative: Poison ivy, oak, or sumac rash suspected (e.g., itchy rash after contact with poison ivy)   Negative: Rash of female genital area (e.g., labia, vagina, vulva)   Negative: Rash of male genital area (e.g., penis, scrotum)   Negative: Redness of immunization site   Negative: Shingles suspected (i.e., painful rash, multiple small blisters grouped together in one area of body; dermatomal distribution)   Negative: Small spot, skin growth, or mole   Negative: Wound infection suspected (i.e., pain, spreading redness, or pus; in a cut, puncture, scrape or sutured wound)   Negative: Fever and localized purple or blood-colored spots or dots that are not from injury or friction   Negative: Fever and localized rash is very painful   Negative: Patient sounds very sick or weak to the triager   Negative: Looks like a boil, infected sore, deep  ulcer, or other infected rash (spreading redness, pus)   Negative: Painful rash with multiple small blisters grouped together (i.e., dermatomal distribution or 'band' or 'stripe')   Negative: Localized rash is very painful (no fever)   Negative: Localized purple or blood-colored spots or dots that are not from injury or friction (no fever)   Negative: Lyme disease suspected (e.g., bull's-eye rash or tick bite / exposure)   Negative: Patient wants to be seen   Negative: Tender bumps in armpits   Negative: Pimples (localized) and no improvement after using CARE ADVICE   Negative: SEVERE local itching persists after 2 days of steroid cream   Negative: Applying cream or ointment and it causes severe itch, burning, or pain   Negative: Medication patch causing local rash or itching    Protocols used: Rash or Redness - [id number]

## 2024-10-01 NOTE — LETTER
10/1/2024         RE: Carlos Ivan  [address]  [place] [address]        Dear Colleague,    Thank you for referring your patient, Carlos Ivan, [facility] Please see a copy of my visit note below.      Diabetes Self-Management Education & Support    Presents for:      Type of Service: In Person Visit      ASSESSMENT:    Pt is due for A1C redraw - we will draw today.     Endorses polydipsia, polyuria.    Estimated Average Glucose  <117 mg/dL 151 High      Hemoglobin A1C  0.0 - 5.6 % 6.9 High   (10/1/24) 9.5 High (3/4/24)      A1c now meeting ADA goal of <7% - congratulated pt and encouraged her to continue   Pt shares that she has been working very diligently on modifying her diet/intake.     She does not have her BG log with her today but shares that she checks her BG two to three times daily at home, FBS and 2 hours PP. Reports that most of her FBS are <130 and post prandial BS readings are usually 140-150    Pt presents with her med vials. She is currently taking:  Metformin 2000 mg daily and Jardiance 25 mg daily - tolerating well.  Reports missed doses ~1x/wk     Consumes 2 meals daily with rice, veg, melissa, meat/chicken. Continues to watch her rice portions and limits it to to one small bowl per meal. Also does more veggies and some protein.  We reviewed plate method for healthy, balanced, well proportioned meals.     Activity: stays active doing chores/playing with her 16 m/o son. Has not been going jogging as much now.    *Pt also requests a refill for albuterol inhaler - writer called [facility] later at Ph: [phone] and spoke to Kiley to ensure that there will be no issues with refilling it.    ** Pt would like someone to look at the rash on her neck that she has had for over a month now - writer requested nurse triage and our RN, Laron was able to help out.    *** Pt endorses difficulty falling asleep/high stress due to finances - states both she  "and her  have been trying to get more PCA hours but have not been successful at that - writer requested [facility] outreach to help pt with any additional financial resources and/or guidance.    Patient's most recent   Lab Results   Component Value Date    A1C 9.5 03/04/2024     is not meeting goal of <7.0    PLAN    Continue with current therapy    Pt instructions:  Continue to take your meds as prescribed.  Continue to test blood sugar one a day at different times: in the morning before eating, with the goal to be under 130 or two hours after a meal, with the goal to be under 180.  Continue to limit rice portions to just 1 cup/fist sized portions per meal. Add in more vegetables, and lean meat/protein  Regular activity as able/safe  Please bring your meter and/or BG log, med vials to all clinic appointments.      Topics to cover at upcoming visits: Problem Solving and Reducing Risks     Follow-up: 3 months (or sooner if concerns)  PCP:11/13    See Care Plan for co-developed, patient-state behavior change goals.  AVS provided for patient today.    Education Materials Provided:  No new materials provided today    SUBJECTIVE/OBJECTIVE:     Cultural Influences/Ethnic Background:  Not  or     Diabetes Symptoms & Complications:     Patient Problem List and Family Medical History reviewed for relevant medical history, current medical status, and diabetes risk factors.    Vitals:  LMP  (LMP Unknown)   Estimated body mass index is 28.87 kg/m  as calculated from the following:    Height as of 9/17/24: 1.549 m (5' 1\").    Weight as of 9/17/24: 69.3 kg (152 lb 12.8 oz).   Last 3 BP:   BP Readings from Last 3 Encounters:   09/17/24 100/56   07/31/24 108/75   04/04/24 112/74       History   Smoking Status     Never   Smokeless Tobacco     Current     Types: Chew       Labs:  Lab Results   Component Value Date    A1C 9.5 03/04/2024     Lab Results   Component Value Date     09/17/2024     09/17/2024 " "    02/08/2021     Lab Results   Component Value Date     04/04/2024     Direct Measure HDL   Date Value Ref Range Status   04/04/2024 37 (L) >=50 mg/dL Final   ]  GFR Estimate   Date Value Ref Range Status   09/17/2024 >90 >60 mL/min/1.73m2 Final     Comment:     eGFR calculated using 2021 CKD-EPI equation.   02/08/2021 >60 >60 mL/min/1.73m2 Final     GFR Estimate If Black   Date Value Ref Range Status   02/08/2021 >60 >60 mL/min/1.73m2 Final     Lab Results   Component Value Date    CR 0.73 09/17/2024     No results found for: \"MICROALBUMIN\"    Taking Medications:  Diabetes Medication(s)       Biguanides       metFORMIN (GLUCOPHAGE XR) 500 MG 24 hr tablet Take 2 tablets (1,000 mg) by mouth 2 times daily (with meals)       Sodium-Glucose Co-Transporter 2 (SGLT2) Inhibitors       empagliflozin (JARDIANCE) 25 MG TABS tablet Take 1 tablet (25 mg) by mouth daily             Patient Activation Measure Survey Score:       No data to display                Time Spent: 120 minutes  Encounter Type: Individual    Any diabetes medication dose changes were made via the CDE Protocol per the patient's referring provider. A copy of this encounter was shared with the provider.       "

## 2024-10-01 NOTE — PROGRESS NOTES
Clinic Care Coordination Contact  Community Health Worker Initial Outreach    CHW Initial Information Gathering:  Referral Source: Care Team  Preferred Hospital: [facility]  [phone]  Preferred Urgent Care: [facility], [phone]  Current living arrangement:: I live in a private home with family  Type of residence:: Apartment  Community Resources: None  Supplies Currently Used at Home: None  Equipment Currently Used at Home: none  Informal Support system:: Family  No PCP office visit in Past Year: No  Transportation means:: Family, Accessible car, Medical transport  CHW Additional Questions  If ED/Hospital discharge, follow-up appointment scheduled as recommended?: N/A  Medication changes made following ED/Hospital discharge?: N/A  MyChart active?: No  Patient agreeable to assistance with activating MyChart?: No    Patient accepts CC: No, patient only needs help from FRW for [place] benefits; SNAP, Rental and Cash Assistant and referred to FRW. Patient will be sent Care Coordination introduction letter for future reference.

## 2024-10-01 NOTE — LETTER
10/1/2024         RE: Carlos Ivan  [address]  [place] [facility] [address]        Dear Colleague,    Thank you for referring your patient, Carlos Ivan, [facility] Please see a copy of my visit note below.      Diabetes Self-Management Education & Support    Presents for:      Type of Service: In Person Visit      ASSESSMENT:    Pt is due for A1C redraw - we will draw today.     Endorses polydipsia, polyuria.    Estimated Average Glucose  <117 mg/dL 151 High      Hemoglobin A1C  0.0 - 5.6 % 6.9 High   (10/1/24) 9.5 High (3/4/24)      A1c now meeting ADA goal of <7% - congratulated pt and encouraged her to continue   Pt shares that she has been working very diligently on modifying her diet/intake.     She does not have her BG log with her today but shares that she checks her BG two to three times daily at home, FBS and 2 hours PP. Reports that most of her FBS are <130 and post prandial BS readings are usually 140-150    Pt presents with her med vials. She is currently taking:  Metformin 2000 mg daily and Jardiance 25 mg daily - tolerating well.  Reports missed doses ~1x/wk     Consumes 2 meals daily with rice, veg, melissa, meat/chicken. Continues to watch her rice portions and limits it to to one small bowl per meal. Also does more veggies and some protein.  We reviewed plate method for healthy, balanced, well proportioned meals.     Activity: stays active doing chores/playing with her 16 m/o son. Has not been going jogging as much now.    *Pt also requests a refill for albuterol inhaler - writer called [facility] later at Ph: [phone] and spoke to Kiley to ensure that there will be no issues with refilling it.    ** Pt would like someone to look at the rash on her neck that she has had for over a month now - writer requested nurse triage and our RN, Laron was able to help out.    ** Pt endorses difficulty falling asleep/high stress due to finances - states both she and  "her  have been trying to get more PCA hours but have not been successful at that - writer requested [facility] outreach to help pt with any additional financial resources and/or guidance.    Patient's most recent   Lab Results   Component Value Date    A1C 9.5 03/04/2024     is not meeting goal of <7.0    PLAN    Continue with current therapy    Pt instructions:  Continue to take your meds as prescribed.  Continue to test blood sugar one a day at different times: in the morning before eating, with the goal to be under 130 or two hours after a meal, with the goal to be under 180.  Continue to limit rice portions to just 1 cup/fist sized portions per meal. Add in more vegetables, and lean meat/protein  Regular activity as able/safe  Please bring your meter and/or BG log, med vials to all clinic appointments.      Topics to cover at upcoming visits: Problem Solving and Reducing Risks     Follow-up: 3 months (or sooner if concerns)  PCP:11/13    See Care Plan for co-developed, patient-state behavior change goals.  AVS provided for patient today.    Education Materials Provided:  No new materials provided today    SUBJECTIVE/OBJECTIVE:     Cultural Influences/Ethnic Background:  Not  or     Diabetes Symptoms & Complications:     Patient Problem List and Family Medical History reviewed for relevant medical history, current medical status, and diabetes risk factors.    Vitals:  LMP  (LMP Unknown)   Estimated body mass index is 28.87 kg/m  as calculated from the following:    Height as of 9/17/24: 1.549 m (5' 1\").    Weight as of 9/17/24: 69.3 kg (152 lb 12.8 oz).   Last 3 BP:   BP Readings from Last 3 Encounters:   09/17/24 100/56   07/31/24 108/75   04/04/24 112/74       History   Smoking Status    Never   Smokeless Tobacco    Current    Types: Chew       Labs:  Lab Results   Component Value Date    A1C 9.5 03/04/2024     Lab Results   Component Value Date     09/17/2024     09/17/2024    GLC " "100 02/08/2021     Lab Results   Component Value Date     04/04/2024     Direct Measure HDL   Date Value Ref Range Status   04/04/2024 37 (L) >=50 mg/dL Final   ]  GFR Estimate   Date Value Ref Range Status   09/17/2024 >90 >60 mL/min/1.73m2 Final     Comment:     eGFR calculated using 2021 CKD-EPI equation.   02/08/2021 >60 >60 mL/min/1.73m2 Final     GFR Estimate If Black   Date Value Ref Range Status   02/08/2021 >60 >60 mL/min/1.73m2 Final     Lab Results   Component Value Date    CR 0.73 09/17/2024     No results found for: \"MICROALBUMIN\"    Taking Medications:  Diabetes Medication(s)       Biguanides       metFORMIN (GLUCOPHAGE XR) 500 MG 24 hr tablet Take 2 tablets (1,000 mg) by mouth 2 times daily (with meals)       Sodium-Glucose Co-Transporter 2 (SGLT2) Inhibitors       empagliflozin (JARDIANCE) 25 MG TABS tablet Take 1 tablet (25 mg) by mouth daily             Patient Activation Measure Survey Score:       No data to display                Time Spent: 120 minutes  Encounter Type: Individual    Any diabetes medication dose changes were made via the CDE Protocol per the patient's referring provider. A copy of this encounter was shared with the provider.       "

## 2024-10-01 NOTE — LETTER
10/1/2024         RE: Carlos Ivan  [address]  [place] [facility] [address]        Dear Colleague,    Thank you for referring your patient, Carlos Ivan, [facility] Please see a copy of my visit note below.      Diabetes Self-Management Education & Support    Presents for:      Type of Service: In Person Visit      ASSESSMENT:    Pt is due for A1C redraw - we will draw today.     Endorses polydipsia, polyuria.    Estimated Average Glucose  <117 mg/dL 151 High      Hemoglobin A1C  0.0 - 5.6 % 6.9 High   (10/1/24) 9.5 High (3/4/24)      A1c now meeting ADA goal of <7% - congratulated pt and encouraged her to continue   Pt shares that she has been working very diligently on modifying her diet/intake.     She does not have her BG log with her today but shares that she checks her BG two to three times daily at home, FBS and 2 hours PP. Reports that most of her FBS are <130 and post prandial BS readings are usually 140-150    Pt presents with her med vials. She is currently taking:  Metformin 2000 mg daily and Jardiance 25 mg daily - tolerating well.  Reports missed doses ~1x/wk     Consumes 2 meals daily with rice, veg, melissa, meat/chicken. Continues to watch her rice portions and limits it to to one small bowl per meal. Also does more veggies and some protein.  We reviewed plate method for healthy, balanced, well proportioned meals.     Activity: stays active doing chores/playing with her 16 m/o son. Has not been going jogging as much now.    *Pt also requests a refill for albuterol inhaler - writer called [facility] later at Ph: [phone] and spoke to Kiley to ensure that there will be no issues with refilling it.    ** Pt would like someone to look at the rash on her neck that she has had for over a month now - writer requested nurse triage and our RN, Laron was able to help out.    ** Pt endorses difficulty falling asleep/high stress due to finances - states both she and  "her  have been trying to get more PCA hours but have not been successful at that - writer requested [facility] outreach to help pt with any additional financial resources and/or guidance.    Patient's most recent   Lab Results   Component Value Date    A1C 9.5 03/04/2024     is not meeting goal of <7.0    PLAN    Continue with current therapy    Pt instructions:  Continue to take your meds as prescribed.  Continue to test blood sugar one a day at different times: in the morning before eating, with the goal to be under 130 or two hours after a meal, with the goal to be under 180.  Continue to limit rice portions to just 1 cup/fist sized portions per meal. Add in more vegetables, and lean meat/protein  Regular activity as able/safe  Please bring your meter and/or BG log, med vials to all clinic appointments.      Topics to cover at upcoming visits: Problem Solving and Reducing Risks     Follow-up: 3 months (or sooner if concerns)  PCP:11/13    See Care Plan for co-developed, patient-state behavior change goals.  AVS provided for patient today.    Education Materials Provided:  No new materials provided today    SUBJECTIVE/OBJECTIVE:     Cultural Influences/Ethnic Background:  Not  or     Diabetes Symptoms & Complications:     Patient Problem List and Family Medical History reviewed for relevant medical history, current medical status, and diabetes risk factors.    Vitals:  LMP  (LMP Unknown)   Estimated body mass index is 28.87 kg/m  as calculated from the following:    Height as of 9/17/24: 1.549 m (5' 1\").    Weight as of 9/17/24: 69.3 kg (152 lb 12.8 oz).   Last 3 BP:   BP Readings from Last 3 Encounters:   09/17/24 100/56   07/31/24 108/75   04/04/24 112/74       History   Smoking Status    Never   Smokeless Tobacco    Current    Types: Chew       Labs:  Lab Results   Component Value Date    A1C 9.5 03/04/2024     Lab Results   Component Value Date     09/17/2024     09/17/2024    GLC " "100 02/08/2021     Lab Results   Component Value Date     04/04/2024     Direct Measure HDL   Date Value Ref Range Status   04/04/2024 37 (L) >=50 mg/dL Final   ]  GFR Estimate   Date Value Ref Range Status   09/17/2024 >90 >60 mL/min/1.73m2 Final     Comment:     eGFR calculated using 2021 CKD-EPI equation.   02/08/2021 >60 >60 mL/min/1.73m2 Final     GFR Estimate If Black   Date Value Ref Range Status   02/08/2021 >60 >60 mL/min/1.73m2 Final     Lab Results   Component Value Date    CR 0.73 09/17/2024     No results found for: \"MICROALBUMIN\"    Taking Medications:  Diabetes Medication(s)       Biguanides       metFORMIN (GLUCOPHAGE XR) 500 MG 24 hr tablet Take 2 tablets (1,000 mg) by mouth 2 times daily (with meals)       Sodium-Glucose Co-Transporter 2 (SGLT2) Inhibitors       empagliflozin (JARDIANCE) 25 MG TABS tablet Take 1 tablet (25 mg) by mouth daily             Patient Activation Measure Survey Score:       No data to display                Time Spent: 120 minutes  Encounter Type: Individual    Any diabetes medication dose changes were made via the CDE Protocol per the patient's referring provider. A copy of this encounter was shared with the provider.       "

## 2024-10-01 NOTE — PROGRESS NOTES
Please defer to Nurse Triage encounter for more information.    Patient was placed on the [place] RN schedule for documentation purposes. Thanks.    APRIL AlexanderN, RN   [facility]

## 2024-10-01 NOTE — PROGRESS NOTES
Diabetes Self-Management Education & Support    Presents for:      Type of Service: In Person Visit      ASSESSMENT:    Pt is due for A1C redraw - we will draw today.     Endorses polydipsia, polyuria.    Estimated Average Glucose  <117 mg/dL 151 High      Hemoglobin A1C  0.0 - 5.6 % 6.9 High   (10/1/24) 9.5 High (3/4/24)      A1c now meeting ADA goal of <7% - congratulated pt and encouraged her to continue   Pt shares that she has been working very diligently on modifying her diet/intake.     She does not have her BG log with her today but shares that she checks her BG two to three times daily at home, FBS and 2 hours PP. Reports that most of her FBS are <130 and post prandial BS readings are usually 140-150    Pt presents with her med vials. She is currently taking:  Metformin 2000 mg daily and Jardiance 25 mg daily - tolerating well.  Reports missed doses ~1x/wk     Consumes 2 meals daily with rice, veg, melissa, meat/chicken. Continues to watch her rice portions and limits it to to one small bowl per meal. Also does more veggies and some protein.  We reviewed plate method for healthy, balanced, well proportioned meals.     Activity: stays active doing chores/playing with her 16 m/o son. Has not been going jogging as much now.    *Pt also requests a refill for albuterol inhaler - writer called [facility] pharmacy later at Ph: [phone] and spoke to Kiley to ensure that there will be no issues with refilling it.    ** Pt would like someone to look at the rash on her neck that she has had for over a month now - writer requested nurse triage and our RN, Laron was able to help out.    ** Pt endorses difficulty falling asleep/high stress due to finances - states both she and her  have been trying to get more PCA hours but have not been successful at that - writer requested [facility] outreach to help pt with any additional financial resources and/or guidance.    Patient's most recent   Lab Results   Component Value  "Date    A1C 9.5 03/04/2024     is not meeting goal of <7.0    PLAN    Continue with current therapy    Pt instructions:  Continue to take your meds as prescribed.  Continue to test blood sugar one a day at different times: in the morning before eating, with the goal to be under 130 or two hours after a meal, with the goal to be under 180.  Continue to limit rice portions to just 1 cup/fist sized portions per meal. Add in more vegetables, and lean meat/protein  Regular activity as able/safe  Please bring your meter and/or BG log, med vials to all clinic appointments.      Topics to cover at upcoming visits: Problem Solving and Reducing Risks     Follow-up: 3 months (or sooner if concerns)  PCP:11/13    See Care Plan for co-developed, patient-state behavior change goals.  AVS provided for patient today.    Education Materials Provided:  No new materials provided today    SUBJECTIVE/OBJECTIVE:     Cultural Influences/Ethnic Background:  Not  or     Diabetes Symptoms & Complications:     Patient Problem List and Family Medical History reviewed for relevant medical history, current medical status, and diabetes risk factors.    Vitals:  LMP  (LMP Unknown)   Estimated body mass index is 28.87 kg/m  as calculated from the following:    Height as of 9/17/24: 1.549 m (5' 1\").    Weight as of 9/17/24: 69.3 kg (152 lb 12.8 oz).   Last 3 BP:   BP Readings from Last 3 Encounters:   09/17/24 100/56   07/31/24 108/75   04/04/24 112/74       History   Smoking Status    Never   Smokeless Tobacco    Current    Types: Chew       Labs:  Lab Results   Component Value Date    A1C 9.5 03/04/2024     Lab Results   Component Value Date     09/17/2024     09/17/2024     02/08/2021     Lab Results   Component Value Date     04/04/2024     Direct Measure HDL   Date Value Ref Range Status   04/04/2024 37 (L) >=50 mg/dL Final   ]  GFR Estimate   Date Value Ref Range Status   09/17/2024 >90 >60 " "mL/min/1.73m2 Final     Comment:     eGFR calculated using 2021 CKD-EPI equation.   02/08/2021 >60 >60 mL/min/1.73m2 Final     GFR Estimate If Black   Date Value Ref Range Status   02/08/2021 >60 >60 mL/min/1.73m2 Final     Lab Results   Component Value Date    CR 0.73 09/17/2024     No results found for: \"MICROALBUMIN\"    Taking Medications:  Diabetes Medication(s)       Biguanides       metFORMIN (GLUCOPHAGE XR) 500 MG 24 hr tablet Take 2 tablets (1,000 mg) by mouth 2 times daily (with meals)       Sodium-Glucose Co-Transporter 2 (SGLT2) Inhibitors       empagliflozin (JARDIANCE) 25 MG TABS tablet Take 1 tablet (25 mg) by mouth daily             Patient Activation Measure Survey Score:       No data to display                Time Spent: 120 minutes  Encounter Type: Individual    Any diabetes medication dose changes were made via the CDE Protocol per the patient's referring provider. A copy of this encounter was shared with the provider.   "

## 2024-10-03 ENCOUNTER — OFFICE VISIT (OUTPATIENT)
Dept: FAMILY MEDICINE | Facility: CLINIC | Age: 34
End: 2024-10-03
Payer: COMMERCIAL

## 2024-10-03 VITALS
WEIGHT: 152.7 LBS | DIASTOLIC BLOOD PRESSURE: 71 MMHG | TEMPERATURE: 98.2 F | HEART RATE: 69 BPM | HEIGHT: 61 IN | RESPIRATION RATE: 16 BRPM | OXYGEN SATURATION: 99 % | BODY MASS INDEX: 28.83 KG/M2 | SYSTOLIC BLOOD PRESSURE: 102 MMHG

## 2024-10-03 DIAGNOSIS — R21 RASH: Primary | ICD-10-CM

## 2024-10-03 PROCEDURE — 90471 IMMUNIZATION ADMIN: CPT | Performed by: FAMILY MEDICINE

## 2024-10-03 PROCEDURE — 91320 SARSCV2 VAC 30MCG TRS-SUC IM: CPT | Performed by: FAMILY MEDICINE

## 2024-10-03 PROCEDURE — 99213 OFFICE O/P EST LOW 20 MIN: CPT | Mod: 25 | Performed by: FAMILY MEDICINE

## 2024-10-03 PROCEDURE — 90656 IIV3 VACC NO PRSV 0.5 ML IM: CPT | Performed by: FAMILY MEDICINE

## 2024-10-03 PROCEDURE — 90480 ADMN SARSCOV2 VAC 1/ONLY CMP: CPT | Performed by: FAMILY MEDICINE

## 2024-10-03 RX ORDER — TRIAMCINOLONE ACETONIDE 1 MG/G
CREAM TOPICAL 2 TIMES DAILY PRN
Qty: 30 G | Refills: 1 | Status: SHIPPED | OUTPATIENT
Start: 2024-10-03

## 2024-10-07 ENCOUNTER — PATIENT OUTREACH (OUTPATIENT)
Dept: CARE COORDINATION | Facility: CLINIC | Age: 34
End: 2024-10-07
Payer: COMMERCIAL

## 2024-11-13 ENCOUNTER — OFFICE VISIT (OUTPATIENT)
Dept: FAMILY MEDICINE | Facility: CLINIC | Age: 34
End: 2024-11-13
Attending: FAMILY MEDICINE
Payer: COMMERCIAL

## 2024-11-13 VITALS
HEIGHT: 61 IN | OXYGEN SATURATION: 100 % | HEART RATE: 75 BPM | WEIGHT: 153 LBS | SYSTOLIC BLOOD PRESSURE: 100 MMHG | RESPIRATION RATE: 12 BRPM | BODY MASS INDEX: 28.89 KG/M2 | DIASTOLIC BLOOD PRESSURE: 70 MMHG | TEMPERATURE: 98.1 F

## 2024-11-13 DIAGNOSIS — J45.20 MILD INTERMITTENT ASTHMA WITHOUT COMPLICATION: ICD-10-CM

## 2024-11-13 DIAGNOSIS — E11.9 TYPE 2 DIABETES MELLITUS WITHOUT COMPLICATION, WITHOUT LONG-TERM CURRENT USE OF INSULIN (H): Primary | ICD-10-CM

## 2024-11-13 PROBLEM — Z87.59 HISTORY OF STILLBIRTH: Status: RESOLVED | Noted: 2017-01-31 | Resolved: 2024-11-13

## 2024-11-13 PROCEDURE — G2211 COMPLEX E/M VISIT ADD ON: HCPCS | Performed by: FAMILY MEDICINE

## 2024-11-13 PROCEDURE — T1013 SIGN LANG/ORAL INTERPRETER: HCPCS | Performed by: INTERPRETER

## 2024-11-13 PROCEDURE — 99214 OFFICE O/P EST MOD 30 MIN: CPT | Performed by: FAMILY MEDICINE

## 2024-11-13 RX ORDER — ALBUTEROL SULFATE 90 UG/1
2 INHALANT RESPIRATORY (INHALATION) EVERY 4 HOURS PRN
Qty: 18 G | Refills: 1 | Status: SHIPPED | OUTPATIENT
Start: 2024-11-13

## 2024-11-13 RX ORDER — METFORMIN HYDROCHLORIDE 500 MG/1
1000 TABLET, EXTENDED RELEASE ORAL 2 TIMES DAILY WITH MEALS
Qty: 360 TABLET | Refills: 4 | Status: SHIPPED | OUTPATIENT
Start: 2024-11-13

## 2024-11-13 ASSESSMENT — ASTHMA QUESTIONNAIRES
QUESTION_4 LAST FOUR WEEKS HOW OFTEN HAVE YOU USED YOUR RESCUE INHALER OR NEBULIZER MEDICATION (SUCH AS ALBUTEROL): ONCE A WEEK OR LESS
ACT_TOTALSCORE: 22
QUESTION_2 LAST FOUR WEEKS HOW OFTEN HAVE YOU HAD SHORTNESS OF BREATH: ONCE OR TWICE A WEEK
QUESTION_3 LAST FOUR WEEKS HOW OFTEN DID YOUR ASTHMA SYMPTOMS (WHEEZING, COUGHING, SHORTNESS OF BREATH, CHEST TIGHTNESS OR PAIN) WAKE YOU UP AT NIGHT OR EARLIER THAN USUAL IN THE MORNING: NOT AT ALL
ACT_TOTALSCORE: 22
QUESTION_5 LAST FOUR WEEKS HOW WOULD YOU RATE YOUR ASTHMA CONTROL: COMPLETELY CONTROLLED
QUESTION_1 LAST FOUR WEEKS HOW MUCH OF THE TIME DID YOUR ASTHMA KEEP YOU FROM GETTING AS MUCH DONE AT WORK, SCHOOL OR AT HOME: A LITTLE OF THE TIME

## 2024-11-13 NOTE — PROGRESS NOTES
Assessment & Plan     Type 2 diabetes mellitus without complication, without long-term current use of insulin (H)  Patient has had a good response to metformin and Jardiance with decreased hemoglobin from 9.5 upon diabetes diagnosis 8 months ago, discussed 0.91-month ago.  However, her fasting blood sugars are still often over 120 and given her young age, I really think we should get her blood sugars as close to normal as possible to reduce long-term complications of hyperglycemia.  She is agreeable to starting Bydureon, which I chose for its ease of use, especially since I think it will be unlikely that she would need to ramp up medication dose to get her blood sugars better.  We went over instructions today by watching video instructions on how to use the Bydureon pen and the  helped interpret this.  She is confident that she can do this, but if she runs into obstacles when she gets home she could please call us and we will give it a written with a nurse, diabetes educator, or [facility] pharmacist to go over directions prior to her next diabetes education appointment in January.  - exenatide ER (BYDUREON BCISE) 2 MG/0.85ML auto-injector  Dispense: 3.4 mL; Refill: 11  - empagliflozin (JARDIANCE) 25 MG TABS tablet  Dispense: 30 tablet; Refill: 11  - metFORMIN (GLUCOPHAGE XR) 500 MG 24 hr tablet  Dispense: 360 tablet; Refill: 4    Mild intermittent asthma without complication  Asthma control test good at 22 today on no controller medications.  She notes that her asthma has looked little bit worse lately due to the cold weather.  Will continue to monitor carefully.  - albuterol (PROAIR HFA/PROVENTIL HFA/VENTOLIN HFA) 108 (90 Base) MCG/ACT inhaler  Dispense: 18 g; Refill: 1    The longitudinal plan of care for the diagnosis(es)/condition(s) as documented were addressed during this visit. Due to the added complexity in care, I will continue to support [facility] in the subsequent management and with ongoing continuity  "of care.    Subjective   Carlos is a 33 year old, presenting for the following health issues:  Diabetes        11/13/2024     7:40 AM   Additional Questions   Roomed by jair graff     History of Present Illness       Diabetes:   She presents for follow up of diabetes.  She is checking home blood glucose two times daily.   She checks blood glucose before meals and after meals.  Blood glucose is sometimes over 200 and never under 70. She is aware of hypoglycemia symptoms including shakiness.    She has no concerns regarding her diabetes at this time.   She is not experiencing numbness or burning in feet, excessive thirst, blurry vision, weight changes or redness, sores or blisters on feet.                 Diabetes Follow-up    See above    Asthma Follow-Up    Was ACT completed today?  Yes        11/13/2024     7:40 AM   ACT Total Scores   ACT TOTAL SCORE (Goal Greater than or Equal to 20) 22    In the past 12 months, how many times did you visit the emergency room for your asthma without being admitted to the hospital? 0    In the past 12 months, how many times were you hospitalized overnight because of your asthma? 0        Patient-reported                Objective    /70   Pulse 75   Temp 98.1  F (36.7  C) (Oral)   Resp 12   Ht 1.545 m (5' 0.83\")   Wt 69.4 kg (153 lb)   LMP 11/03/2024 (Exact Date)   SpO2 100%   BMI 29.07 kg/m    Body mass index is 29.07 kg/m .  Physical Exam     Gen:  A&A, NAD  CV:  HRRR, no M/R/G  Resp:  CTAB  Ext:  W&D, no edema           Signed Electronically by: Tosin Lindo MD    "

## 2025-01-12 ENCOUNTER — HEALTH MAINTENANCE LETTER (OUTPATIENT)
Age: 35
End: 2025-01-12

## 2025-02-05 ENCOUNTER — VIRTUAL VISIT (OUTPATIENT)
Dept: INTERPRETER SERVICES | Facility: CLINIC | Age: 35
End: 2025-02-05

## 2025-02-05 ENCOUNTER — LAB (OUTPATIENT)
Dept: LAB | Facility: CLINIC | Age: 35
End: 2025-02-05
Payer: COMMERCIAL

## 2025-02-05 ENCOUNTER — ALLIED HEALTH/NURSE VISIT (OUTPATIENT)
Dept: EDUCATION SERVICES | Facility: CLINIC | Age: 35
End: 2025-02-05
Payer: COMMERCIAL

## 2025-02-05 VITALS — BODY MASS INDEX: 29.87 KG/M2 | WEIGHT: 157.2 LBS

## 2025-02-05 DIAGNOSIS — E11.9 DIABETES MELLITUS (H): Primary | ICD-10-CM

## 2025-02-05 DIAGNOSIS — E11.9 TYPE 2 DIABETES MELLITUS WITHOUT COMPLICATION, WITHOUT LONG-TERM CURRENT USE OF INSULIN (H): ICD-10-CM

## 2025-02-05 LAB
EST. AVERAGE GLUCOSE BLD GHB EST-MCNC: 154 MG/DL
HBA1C MFR BLD: 7 % (ref 0–5.6)

## 2025-02-05 PROCEDURE — 83036 HEMOGLOBIN GLYCOSYLATED A1C: CPT

## 2025-02-05 PROCEDURE — G0108 DIAB MANAGE TRN  PER INDIV: HCPCS | Performed by: DIETITIAN, REGISTERED

## 2025-02-05 PROCEDURE — T1013 SIGN LANG/ORAL INTERPRETER: HCPCS | Mod: U4

## 2025-02-05 PROCEDURE — 36415 COLL VENOUS BLD VENIPUNCTURE: CPT

## 2025-02-05 NOTE — LETTER
2025         RE: Carlos Ivan  [address]  [place] [address]        Dear Colleague,    Thank you for referring your patient, Carlos Ivan, [facility]. Please see a copy of my visit note below.        Diabetes Self-Management Education & Support    Presents for:      Type of Service: In Person Visit      Assessment  Pt is due for A1C redraw - we will draw today.         Estimated Average Glucose  <117 mg/dL 151 High       Hemoglobin A1C  0.0 - 5.6 % 6.9 High   (10/1/24) 9.5 High (3/4/24)       A1c now meeting ADA goal of <7% - congratulated pt and encouraged her to continue   Pt shares that she has been working very diligently on modifying her diet/intake.      She does not have her BG log with her today but shares that she checks her BG two to three times daily at home, FBS and 2 hours PP. Reports that most of her FBS are <130 and post prandial BS readings are usually 140-150     Current meds:  Pt presents with her med vials and bydureon pen    Metformin 2000 mg daily and Jardiance 25 mg daily and 2 mg bydureon (on ) - tolerating well.  Reports missed doses ~1x/wk    SMBG:  Tests ~ 1-2x/d, a few times a week (not daily):  FBG  - :  FB, 116, 110, 111, 129, 115, 110, 125, 101  2 hours after meal: 157, 168, 138, 148, 153, 116, 160, 170    Endorses decreased appetite x the past 2-3 weeks. Of note, pt   Food recall:  AM: just coffee  Lunch:rice, veg, meat  PM:rice, veg       Consumes 2 meals daily with rice, veg, melissa, meat/chicken. Continues to watch her rice portions and limits it to to one small bowl per meal. Also does more veggies and some protein.  We reviewed plate method for healthy, balanced, well proportioned meals.     Activity: stays active doing chores/playing with her 16 m/o son. Has not been going jogging as much now.      ** Pt endorses difficulty falling asleep/high stress due to finances - states both she and her  have been  "trying to get more PCA hours but have not been successful at that - writer requested [facility] outreach to help pt with any additional financial resources and/or guidance.       Patient's most recent   Lab Results   Component Value Date    A1C 6.9 10/01/2024     is meeting goal of <7.0    Care Plan and Education Provided:  {Care Plan and Education Provided:799630}    Patient verbalized understanding of diabetes self-management education concepts discussed, opportunities for ongoing education and support, and recommendations provided today.    Plan    ***  Topics to cover at upcoming visits: Healthy Eating, Taking Medication, and Reducing Risks    Follow-up:  Upcoming Diabetes Ed Appointments     Visit Type Date Time Department    DIABETES ED 2/5/2025 11:00 AM [facility] DIABETES EDUCATION        See Care Plan for co-developed, patient-state behavior change goals.    Education Materials Provided:  {CDE EDUCATION MATERIALS:437102}      Subjective/Objective  Carlos is an 34 year old year old, presenting for the following diabetes education related to:    Cultural Influences/Ethnic Background:  Not  or   ***    Diabetes Symptoms & Complications:          Patient Problem List and Family Medical History reviewed for relevant medical history, current medical status, and diabetes risk factors.    Vitals:  There were no vitals taken for this visit.  Estimated body mass index is 29.07 kg/m  as calculated from the following:    Height as of 11/13/24: 1.545 m (5' 0.83\").    Weight as of 11/13/24: 69.4 kg (153 lb).   Last 3 BP:   BP Readings from Last 3 Encounters:   11/13/24 100/70   10/03/24 102/71   09/17/24 100/56       History   Smoking Status     Never   Smokeless Tobacco     Current     Types: Chew       Labs:  Lab Results   Component Value Date    A1C 6.9 10/01/2024     Lab Results   Component Value Date     09/17/2024     09/17/2024     02/08/2021     Lab Results   Component Value Date     " "04/04/2024     Direct Measure HDL   Date Value Ref Range Status   04/04/2024 37 (L) >=50 mg/dL Final   ]  GFR Estimate   Date Value Ref Range Status   09/17/2024 >90 >60 mL/min/1.73m2 Final     Comment:     eGFR calculated using 2021 CKD-EPI equation.   02/08/2021 >60 >60 mL/min/1.73m2 Final     GFR Estimate If Black   Date Value Ref Range Status   02/08/2021 >60 >60 mL/min/1.73m2 Final     Lab Results   Component Value Date    CR 0.73 09/17/2024     No results found for: \"MICROALBUMIN\"    {Core Behaviors Assessed Today:462287}    Dina Silverman RD  Time Spent: {:445497} minutes  Encounter Type: Individual    Any diabetes medication dose changes were made via the CDCES Standing Orders under the patient's referring provider.       "

## 2025-02-05 NOTE — LETTER
2/5/2025         RE: Carlos Ivan  [address]  [facility] [address]        Dear Colleague,    Thank you for referring your patient, Carlos Ivan, to the [facility]. Please see a copy of my visit note below.        Diabetes Self-Management Education & Support    Presents for:      Type of Service: In Person Visit      Assessment  Follow up visit, conducted with the help of an   A1c:7.0 (2/5) > 6.9 (10/1) < 9.5 (3/4/24), reviewed with pt.   Bydureon was started in Nov, 2024.   ?? Med adherence    Pt endorses drinking lemonade regularly and has also gained 4 lbs in ~2 months. Current wt:157 lb (2/5) > 153 lb (11/13/24)     Current meds:  Presents with her med vials and bydureon pen    Metformin 2000 mg daily and Jardiance 25 mg daily and 2 mg bydureon (on Mondays)  Reports missed doses of bydureon ~1x/month    Pt has had issues with injecting bydureon asked if we could help with the shot today. However, the injector didn't work ** and she could not take her shot. She reported having had the exact same issue at least couple more times. She has 4 more pens at home and will use one of those later in the evening and will let us know if she runs into issues still. She has had a friend help her at home. We reviewed steps to inject bydureon and also watched a demo video.   I also called [facility] pharmacy later at [phone] and spoke to Danette (pharmacist) - she will try for an override and see if she she could order a replacement.     **After the regular process of shaking, turning the knob, taking off the orange cap - when pressed against the stomach to actually inject it, it clicked, but the plunger never engaged. The orange plunger never came down, and it didn't inject anything. When we eventually pulled it away from the stomach, THEN the plunger came down, and it just shot the medicine out.      Carlos also wonders if she could be switched to a different injectable since this is  too complicated and has a thick needle. I ran a benefits assessment on Trulicity and Ozempic and both would require Prior Authorization - have communicated with PCP via staff message. Pt has an upcoming PCP appt on 3/14.   In the meantime, she will focus on eating more mindfully and QUIT drinking lemonade.     SMBG:  Tests ~ 1-2x/d, a few times a week (not daily):  FBG  - :  FB, 116, 110, 111, 129, 115, 110, 125, 101  2 hours after meal: 157, 168, 138, 148, 153, 116, 160, 170    Endorses decreased appetite x the past 2-3 weeks.   Food recall:  AM: just coffee  Lunch:rice, veg, meat  PM:rice, veg  Endorses drinking lemonade especially      Activity: does chores/plays with her 3 y/o son.         Patient's most recent   Lab Results   Component Value Date    A1C 6.9 10/01/2024     is meeting goal of <7.0    Care Plan and Education Provided:  Healthy Eating: Balanced meals, Plate planning method, and Portion control, Being Active: Finding a physical activity routine that works for you, Monitoring: Frequency of monitoring and Individual glucose targets, Taking Medication: Action of prescribed medication(s) and Proper site selection and rotation for injections, and Problem Solving: When to call a health care provider    Patient verbalized understanding of diabetes self-management education concepts discussed, opportunities for ongoing education and support, and recommendations provided today.    Plan    Continue to take your meds as prescribed. Call us if you are still having issues with injecting bydureon.   Eat healthy, balanced meals. As discussed, use the plate method   Drink water when thirsty instead of lemonade.  Regular activity, as able/safe  Always bring your meter, BG log and med vials to all clinic visits - pt expressed understanding     Topics to cover at upcoming visits: Healthy Eating, Taking Medication, and Reducing Risks    Follow-up:  Upcoming Diabetes Ed Appointments     Visit Type Date Time  "Department    DIABETES ED 2/5/2025 11:00 AM [facility] DIABETES EDUCATION        See Care Plan for co-developed, patient-state behavior change goals.    Education Materials Provided:  No new materials provided today    Subjective/Objective  Carlos is an 34 year old year old, presenting for the following diabetes education related to:    Cultural Influences/Ethnic Background:  Not  or     Diabetes Symptoms & Complications:    Patient Problem List and Family Medical History reviewed for relevant medical history, current medical status, and diabetes risk factors.    Vitals:  There were no vitals taken for this visit.  Estimated body mass index is 29.07 kg/m  as calculated from the following:    Height as of 11/13/24: 1.545 m (5' 0.83\").    Weight as of 11/13/24: 69.4 kg (153 lb).   Last 3 BP:   BP Readings from Last 3 Encounters:   11/13/24 100/70   10/03/24 102/71   09/17/24 100/56       History   Smoking Status    Never   Smokeless Tobacco    Current    Types: Chew       Labs:  Lab Results   Component Value Date    A1C 6.9 10/01/2024     Lab Results   Component Value Date     09/17/2024     09/17/2024     02/08/2021     Lab Results   Component Value Date     04/04/2024     Direct Measure HDL   Date Value Ref Range Status   04/04/2024 37 (L) >=50 mg/dL Final   ]  GFR Estimate   Date Value Ref Range Status   09/17/2024 >90 >60 mL/min/1.73m2 Final     Comment:     eGFR calculated using 2021 CKD-EPI equation.   02/08/2021 >60 >60 mL/min/1.73m2 Final     GFR Estimate If Black   Date Value Ref Range Status   02/08/2021 >60 >60 mL/min/1.73m2 Final     Lab Results   Component Value Date    CR 0.73 09/17/2024     No results found for: \"MICROALBUMIN\"      Taking Medications:  Diabetes Medication(s)       Biguanides       metFORMIN (GLUCOPHAGE XR) 500 MG 24 hr tablet Take 2 tablets (1,000 mg) by mouth 2 times daily (with meals).       Sodium-Glucose Co-Transporter 2 (SGLT2) Inhibitors       " empagliflozin (JARDIANCE) 25 MG TABS tablet Take 1 tablet (25 mg) by mouth daily.       Incretin Mimetic Agents       exenatide ER (BYDUREON BCISE) 2 MG/0.85ML auto-injector Inject 2 mg subcutaneously every 7 days.            Dina Silverman RD  Time Spent: 115 minutes  Encounter Type: Individual    Any diabetes medication dose changes were made via the CDCES Standing Orders under the patient's referring provider.

## 2025-02-05 NOTE — PROGRESS NOTES
Diabetes Self-Management Education & Support    Presents for:      Type of Service: In Person Visit      Assessment  Follow up visit, conducted with the help of an   A1c:7.0 () > 6.9 (10/1) < 9.5 (3/4/24), reviewed with pt.   Bydureon was started in 2024.   ?? Med adherence    Pt endorses drinking lemonade regularly.  Has gained 4 lbs in ~2 months. Current wt:157 lb () > 153 lb (24)     Current meds:   Metformin 2000 mg daily and Jardiance 25 mg daily and 2 mg bydureon (on )  Reports missed doses of bydureon ~1x/month  Presents with her med vials and bydureon pen     Pt has had issues with injecting bydureon and asked if we could help with the shot today. We reviewed the steps and prepped for the shot, however, the injector didn't work (the orange plunger never came down, and it didn't inject anything. When we eventually pulled it away from the abdomen, THEN the plunger came down, and it just shot the medicine out). She reported having had the exact same issue at least a couple more times. She has 4 more pens at home and will use one of those later in the evening and will let us know if she runs into issues still. She has had a friend help her at home. We reviewed steps to inject bydureon and also watched a demo video.     I also called [facility] pharmacy later at [phone] and spoke to Danette (pharmacist) - she will try for an override and see if she she could order a replacement.     **Carlos wonders if she could be switched to a different injectable since this is too complicated and has a thick needle too. I ran a benefits assessment on Trulicity and Ozempic and both would require Prior Authorization - have communicated with PCP via staff message. Pt has an upcoming PCP appt on 3/14.   In the meantime, she will focus on eating more mindfully and QUIT drinking lemonade.     SMBG:  Tests ~ 1-2x/d, a few times a week (not daily):  FBG  - :  FB, 116, 110, 111, 129, 115,  110, 125, 101  2 hours after meal: 157, 168, 138, 148, 153, 116, 160, 170    Endorses decreased appetite x the past 2-3 weeks.   Food recall:  AM: just coffee  Lunch:rice, veg, meat  PM:rice, veg  Endorses drinking lemonade especially      Activity: does chores/plays with her 1 y/o son.         Patient's most recent   Lab Results   Component Value Date    A1C 6.9 10/01/2024     is meeting goal of <7.0    Care Plan and Education Provided:  Healthy Eating: Balanced meals, Plate planning method, and Portion control, Being Active: Finding a physical activity routine that works for you, Monitoring: Frequency of monitoring and Individual glucose targets, Taking Medication: Action of prescribed medication(s) and Proper site selection and rotation for injections, and Problem Solving: When to call a health care provider    Patient verbalized understanding of diabetes self-management education concepts discussed, opportunities for ongoing education and support, and recommendations provided today.    Plan    Continue to take your meds as prescribed. Call us if you are still having issues with injecting bydureon.   Eat healthy, balanced meals. As discussed, use the plate method   Drink water when thirsty instead of lemonade.  Regular activity, as able/safe  Always bring your meter, BG log and med vials to all clinic visits - pt expressed understanding     Topics to cover at upcoming visits: Healthy Eating, Taking Medication, and Reducing Risks    Follow-up:  Upcoming Diabetes Ed Appointments     Visit Type Date Time Department    DIABETES ED 2/5/2025 11:00 AM [facility] DIABETES EDUCATION        See Care Plan for co-developed, patient-state behavior change goals.    Education Materials Provided:  No new materials provided today    Subjective/Objective  Carlos is an 34 year old year old, presenting for the following diabetes education related to:    Cultural Influences/Ethnic Background:  Not  or     Diabetes Symptoms &  "Complications:    Patient Problem List and Family Medical History reviewed for relevant medical history, current medical status, and diabetes risk factors.    Vitals:  There were no vitals taken for this visit.  Estimated body mass index is 29.07 kg/m  as calculated from the following:    Height as of 11/13/24: 1.545 m (5' 0.83\").    Weight as of 11/13/24: 69.4 kg (153 lb).   Last 3 BP:   BP Readings from Last 3 Encounters:   11/13/24 100/70   10/03/24 102/71   09/17/24 100/56       History   Smoking Status    Never   Smokeless Tobacco    Current    Types: Chew       Labs:  Lab Results   Component Value Date    A1C 6.9 10/01/2024     Lab Results   Component Value Date     09/17/2024     09/17/2024     02/08/2021     Lab Results   Component Value Date     04/04/2024     Direct Measure HDL   Date Value Ref Range Status   04/04/2024 37 (L) >=50 mg/dL Final   ]  GFR Estimate   Date Value Ref Range Status   09/17/2024 >90 >60 mL/min/1.73m2 Final     Comment:     eGFR calculated using 2021 CKD-EPI equation.   02/08/2021 >60 >60 mL/min/1.73m2 Final     GFR Estimate If Black   Date Value Ref Range Status   02/08/2021 >60 >60 mL/min/1.73m2 Final     Lab Results   Component Value Date    CR 0.73 09/17/2024     No results found for: \"MICROALBUMIN\"      Taking Medications:  Diabetes Medication(s)       Biguanides       metFORMIN (GLUCOPHAGE XR) 500 MG 24 hr tablet Take 2 tablets (1,000 mg) by mouth 2 times daily (with meals).       Sodium-Glucose Co-Transporter 2 (SGLT2) Inhibitors       empagliflozin (JARDIANCE) 25 MG TABS tablet Take 1 tablet (25 mg) by mouth daily.       Incretin Mimetic Agents       exenatide ER (BYDUREON BCISE) 2 MG/0.85ML auto-injector Inject 2 mg subcutaneously every 7 days.            Dina Silverman RD  Time Spent: 115 minutes  Encounter Type: Individual    Any diabetes medication dose changes were made via the CDCES Standing Orders under the patient's referring provider.   "

## 2025-02-06 ENCOUNTER — TELEPHONE (OUTPATIENT)
Dept: FAMILY MEDICINE | Facility: CLINIC | Age: 35
End: 2025-02-06
Payer: COMMERCIAL

## 2025-02-06 NOTE — TELEPHONE ENCOUNTER
Prior Authorization Retail Medication Request    Medication/Dose: exenatide ER (BYDUREON BCISE) 2 MG/0.85ML auto-injector  Diagnosis and ICD code (if different than what is on RX):    Type 2 diabetes mellitus without complication, without long-term current use of insulin (H) [E11.9]        New/renewal/insurance change PA/secondary ins. PA:   Previously Tried and Failed:  UNKNOWN  Rationale:  UNKNOWN     Insurance   Primary: [facility] triptap  Insurance ID:  [id number]    Secondary (if applicable):[facility]   Insurance ID:  [id number]    Clinic Information  Preferred routing pool for dept communication: [facility] POOL

## 2025-02-12 NOTE — TELEPHONE ENCOUNTER
Retail Pharmacy Prior Authorization Team   Phone: [phone]    PA Initiation    Medication: BYDUREON BCISE 2 MG/0.85ML SC AUIJ  Insurance Company: [id number] - Phone [phone] Fax [phone]  Pharmacy Filling the Rx: [facility] - [place], [place] - [address]  Filling Pharmacy Phone: [phone]  Filling Pharmacy Fax:    Start Date: 2/12/2025

## 2025-02-12 NOTE — TELEPHONE ENCOUNTER
Note: Due to record-high volumes, our turn-around time is taking longer than usual . We are currently 4  business days behind in the pools.   We are working diligently to submit all requests in a timely manner and in the order they are received. Please only flag TRUE URGENT requests as high priority to the pool at this time.   If you have questions on status of PA's,  please send a note/message in the active PA encounter and send back to the [facility] PA pool [[id number]].    If you have questions about the turn-around time or about our process, please reach out to our supervisor Monisha Martines.   Thank you!   RPPA (Retail Pharmacy Prior Authorization) team    Prior Authorization Approval    Authorization Effective Date: 2/12/2025  Authorization Expiration Date: 2/12/2026  Medication: exenatide ER (BYDUREON BCISE) 2 MG/0.85ML auto-injector-APPROVED  Reference #:     Insurance Company: [employer] - Phone [phone] Fax [phone]  Which Pharmacy is filling the prescription (Not needed for infusion/clinic administered): [facility] - [place], [place] - [address] [place] [address]  Pharmacy Notified: Yes  Patient Notified: Instructed pharmacy to notify patient when script is ready to /ship.

## 2025-03-12 ENCOUNTER — OFFICE VISIT (OUTPATIENT)
Dept: FAMILY MEDICINE | Facility: CLINIC | Age: 35
End: 2025-03-12
Attending: FAMILY MEDICINE
Payer: COMMERCIAL

## 2025-03-12 VITALS
SYSTOLIC BLOOD PRESSURE: 100 MMHG | TEMPERATURE: 98.2 F | DIASTOLIC BLOOD PRESSURE: 67 MMHG | OXYGEN SATURATION: 98 % | WEIGHT: 157 LBS | HEART RATE: 84 BPM | RESPIRATION RATE: 16 BRPM | BODY MASS INDEX: 30.82 KG/M2 | HEIGHT: 60 IN

## 2025-03-12 DIAGNOSIS — J45.20 MILD INTERMITTENT ASTHMA WITHOUT COMPLICATION: ICD-10-CM

## 2025-03-12 DIAGNOSIS — H93.11 TINNITUS, RIGHT: ICD-10-CM

## 2025-03-12 DIAGNOSIS — R52 PAIN: ICD-10-CM

## 2025-03-12 DIAGNOSIS — E11.9 TYPE 2 DIABETES MELLITUS WITHOUT COMPLICATION, WITHOUT LONG-TERM CURRENT USE OF INSULIN (H): Primary | ICD-10-CM

## 2025-03-12 PROCEDURE — 99214 OFFICE O/P EST MOD 30 MIN: CPT | Performed by: FAMILY MEDICINE

## 2025-03-12 PROCEDURE — G2211 COMPLEX E/M VISIT ADD ON: HCPCS | Performed by: FAMILY MEDICINE

## 2025-03-12 PROCEDURE — T1013 SIGN LANG/ORAL INTERPRETER: HCPCS | Mod: U4

## 2025-03-12 PROCEDURE — 82570 ASSAY OF URINE CREATININE: CPT | Performed by: FAMILY MEDICINE

## 2025-03-12 PROCEDURE — 3078F DIAST BP <80 MM HG: CPT | Performed by: FAMILY MEDICINE

## 2025-03-12 PROCEDURE — 3074F SYST BP LT 130 MM HG: CPT | Performed by: FAMILY MEDICINE

## 2025-03-12 PROCEDURE — 82043 UR ALBUMIN QUANTITATIVE: CPT | Performed by: FAMILY MEDICINE

## 2025-03-12 RX ORDER — IBUPROFEN 600 MG/1
600 TABLET, FILM COATED ORAL EVERY 8 HOURS PRN
Qty: 50 TABLET | Refills: 1 | Status: SHIPPED | OUTPATIENT
Start: 2025-03-12

## 2025-03-12 NOTE — LETTER
My Asthma Action Plan    Name: Carlos Ivan   YOB: 1990  Date: 3/12/2025   My doctor: Tosin Lindo MD   My clinic: [facility]        My Rescue Medicine:   Albuterol inhaler (Proair/Ventolin/Proventil HFA)  2-4 puffs EVERY 4 HOURS as needed. Use a spacer if recommended by your provider.   My Asthma Severity:   Intermittent / Exercise Induced  Know your asthma triggers: Patient is unaware of triggers             GREEN ZONE   Good Control  I feel good  No cough or wheeze  Can work, sleep and play without asthma symptoms       Take your asthma control medicine every day.     If exercise triggers your asthma, take your rescue medication  15 minutes before exercise or sports, and  During exercise if you have asthma symptoms  Spacer to use with inhaler: If you have a spacer, make sure to use it with your inhaler             YELLOW ZONE Getting Worse  I have ANY of these:  I do not feel good  Cough or wheeze  Chest feels tight  Wake up at night   Keep taking your Green Zone medications  Start taking your rescue medicine:  every 20 minutes for up to 1 hour. Then every 4 hours for 24-48 hours.  If you stay in the Yellow Zone for more than 12-24 hours, contact your doctor.  If you do not return to the Green Zone in 12-24 hours or you get worse, start taking your oral steroid medicine if prescribed by your provider.           RED ZONE Medical Alert - Get Help  I have ANY of these:  I feel awful  Medicine is not helping  Breathing getting harder  Trouble walking or talking  Nose opens wide to breathe       Take your rescue medicine NOW  If your provider has prescribed an oral steroid medicine, start taking it NOW  Call your doctor NOW  If you are still in the Red Zone after 20 minutes and you have not reached your doctor:  Take your rescue medicine again and  Call 911 or go to the emergency room right away    See your regular doctor within 2 weeks of an Emergency Room or Urgent Care visit  for follow-up treatment.          Annual Reminders:  Meet with Asthma Educator,  Flu Shot in the Fall, consider Pneumonia Vaccination for patients with asthma (aged 19 and older).    Pharmacy:    [facility] - [place], [place] - [address] [facility] - [place], [place] - [address] [facility] [place], [place] - [address]    Electronically signed by Tosin Lindo MD   Date: 03/12/25                    Asthma Triggers  How To Control Things That Make Your Asthma Worse    Triggers are things that make your asthma worse.  Look at the list below to help you find your triggers and   what you can do about them. You can help prevent asthma flare-ups by staying away from your triggers.      Trigger                                                          What you can do   Cigarette Smoke  Tobacco smoke can make asthma worse. Do not allow smoking in your home, car or around you.  Be sure no one smokes at a child s day care or school.  If you smoke, ask your health care provider for ways to help you quit.  Ask family members to quit too.  Ask your health care provider for a referral to Quit Plan to help you quit smoking, or call 9-434-706-PLAN.     Colds, Flu, Bronchitis  These are common triggers of asthma. Wash your hands often.  Don t touch your eyes, nose or mouth.  Get a flu shot every year.     Dust Mites  These are tiny bugs that live in cloth or carpet. They are too small to see. Wash sheets and blankets in hot water every week.   Encase pillows and mattress in dust mite proof covers.  Avoid having carpet if you can. If you have carpet, vacuum weekly.   Use a dust mask and HEPA vacuum.   Pollen and Outdoor Mold  Some people are allergic to trees, grass, or weed pollen, or molds. Try to keep your windows closed.  Limit time out doors when pollen count is high.   Ask you health care provider about taking medicine during allergy season.     Animal Dander  Some people are  allergic to skin flakes, urine or saliva from pets with fur or feathers. Keep pets with fur or feathers out of your home.    If you can t keep the pet outdoors, then keep the pet out of your bedroom.  Keep the bedroom door closed.  Keep pets off cloth furniture and away from stuffed toys.     Mice, Rats, and Cockroaches  Some people are allergic to the waste from these pests.   Cover food and garbage.  Clean up spills and food crumbs.  Store grease in the refrigerator.   Keep food out of the bedroom.   Indoor Mold  This can be a trigger if your home has high moisture. Fix leaking faucets, pipes, or other sources of water.   Clean moldy surfaces.  Dehumidify basement if it is damp and smelly.   Smoke, Strong Odors, and Sprays  These can reduce air quality. Stay away from strong odors and sprays, such as perfume, powder, hair spray, paints, smoke incense, paint, cleaning products, candles and new carpet.   Exercise or Sports  Some people with asthma have this trigger. Be active!  Ask your doctor about taking medicine before sports or exercise to prevent symptoms.    Warm up for 5-10 minutes before and after sports or exercise.     Other Triggers of Asthma  Cold air:  Cover your nose and mouth with a scarf.  Sometimes laughing or crying can be a trigger.  Some medicines and food can trigger asthma.

## 2025-03-12 NOTE — PROGRESS NOTES
"  {PROVIDER CHARTING PREFERENCE:558974}    Subjective   Carlos is a 34 year old, presenting for the following health issues:  Diabetes, Ear Problem (Right ear ringing), and Recheck Medication (Pt has question regarding about exenatide ER (BYDUREON BCISE) 2 MG/0.85ML auto-injector/)  {(!) Visit Details have not yet been documented.  Please enter Visit Details and then use this list to pull in documentation. (Optional):773258}  Ear Problem          {MA/LPN/RN Pre-Provider Visit Orders- hCG/UA/Strep (Optional):975529}  {SUPERLIST (Optional):876839}  {additonal problems for provider to add (Optional):283433}    {ROS Picklists (Optional):551583}      Objective    BP (!) 84/56 (BP Location: Right arm, Patient Position: Sitting, Cuff Size: Adult Regular)   Pulse 84   Temp 98.2  F (36.8  C) (Oral)   Resp 16   Ht 1.536 m (5' 0.47\")   Wt 71.2 kg (157 lb)   LMP 03/08/2025 (Approximate)   SpO2 98%   BMI 30.19 kg/m    Body mass index is 30.19 kg/m .  Physical Exam   {Exam List (Optional):163400}    {Diagnostic Test Results (Optional):925516}        Signed Electronically by: Tosin Lindo MD  {Email feedback regarding this note to [email or web address]   :159606}  " if traveling.    Follow-up  Follow-up necessary for continued management of diabetes and other health concerns.  - Schedule follow-up appointment in June or July.  - Nurse to set up next appointment and provide new notebook for blood sugar recording.        The longitudinal plan of care for the diagnosis(es)/condition(s) as documented were addressed during this visit. Due to the added complexity in care, I will continue to support Carlos in the subsequent management and with ongoing continuity of care.                 Subjective   Carlos is a 34 year old female who presents for Diabetes, Ear Problem (Right ear ringing), and Recheck Medication (Pt has question regarding about exenatide ER (BYDUREON BCISE) 2 MG/0.85ML auto-injector/)    Answers submitted by the patient for this visit:  Diabetes Visit (Submitted on 3/12/2025)  Chief Complaint: Chronic problems general questions HPI Form  Frequency of checking blood sugars:: two times daily  What time of day are you checking your blood sugars : before and after meals  Have you had any blood sugars above 200?: No  Have you had any blood sugars below 70?: No  Hypoglycemia symptoms:: shakiness, weakness, lethargy  Diabetic concerns:: none  Paraesthesia present:: none of these symptoms  Questionnaire about: Chronic problems general questions HPI Form (Submitted on 3/12/2025)  Chief Complaint: Chronic problems general questions HPI Form    History of Present Illness  The patient presents for follow-up on diabetes management and reports ringing in the right ear.    She reports issues with her Bydureon injections, noting that the pen sometimes pops open when shaken, leading to broken pens. She recently obtained a new package of five pens but did not administer an injection last week. She monitors her blood sugar after meals, with recent readings around 127 mg/dL. She engages in at least 20 minutes of exercise every evening, which she believes has positively impacted her blood sugar  "levels. Her current medications include Jardiance, metformin, and Bydureon.    She has been experiencing a 'windy noise' in her right ear for the past three to four months. The noise is constant and intensifies with movement, particularly in quiet environments. There is no associated pain, but she sometimes feels a blockage in the ear. Although there is no hearing loss, the noise complicates her ability to assess her hearing. She attempted to clean her ear, suspecting earwax, but the noise persists.    Regarding her asthma, she no longer experiences asthma attacks, only a cough. She brought her inhaler to the appointment but did not report any recent issues with asthma management.      Objective     Vital signs:  /67 (BP Location: Left arm, Patient Position: Sitting, Cuff Size: Adult Regular)   Pulse 84   Temp 98.2  F (36.8  C) (Oral)   Resp 16   Ht 1.536 m (5' 0.47\")   Wt 71.2 kg (157 lb)   LMP 03/08/2025 (Approximate)   SpO2 98%   BMI 30.19 kg/m    Blood pressure 100/67, pulse 84, temperature 98.2  F (36.8  C), temperature source Oral, resp. rate 16, height 1.536 m (5' 0.47\"), weight 71.2 kg (157 lb), last menstrual period 03/08/2025, SpO2 98%, not currently breastfeeding.  Physical Exam  HEENT: Left ear normal, right ear canal clear with no cerumen impaction or signs of infection    Results for orders placed or performed in visit on 03/12/25   Albumin Random Urine Quantitative with Creat Ratio   Result Value Ref Range    Creatinine Urine mg/dL 104.0 mg/dL    Albumin Urine mg/L 30.9 mg/L    Albumin Urine mg/g Cr 29.71 (H) 0.00 - 25.00 mg/g Cr             Consent was obtained from the patient to use an AI documentation tool in the creation of  this note        "

## 2025-03-13 LAB
CREAT UR-MCNC: 104 MG/DL
MICROALBUMIN UR-MCNC: 30.9 MG/L
MICROALBUMIN/CREAT UR: 29.71 MG/G CR (ref 0–25)

## 2025-03-30 PROBLEM — R80.9 MICROALBUMINURIA: Status: ACTIVE | Noted: 2025-03-30

## 2025-04-05 ENCOUNTER — TELEPHONE (OUTPATIENT)
Dept: FAMILY MEDICINE | Facility: CLINIC | Age: 35
End: 2025-04-05
Payer: COMMERCIAL

## 2025-04-05 DIAGNOSIS — E11.9 TYPE 2 DIABETES MELLITUS WITHOUT COMPLICATION, WITHOUT LONG-TERM CURRENT USE OF INSULIN (H): Primary | ICD-10-CM

## 2025-04-05 NOTE — TELEPHONE ENCOUNTER
Please call pt and let her know that I've sent a new prescription for her to use for diabetes shot, like we had discussed at her appointment a few weeks ago (she was going to use up currently supply before switching).  Like the Bydureon that she's on now, it's a once weekly shot.  The dose will increase over time. It will be 0.25mg weekly x4 weeks, then 0.5mg weekly. Plan to keep appointment with diabetes educator in May as scheduled to see how things are going on new med.        Type 2 diabetes mellitus without complication, without long-term current use of insulin (H)  -     semaglutide (OZEMPIC) 2 MG/3ML pen; Inject 0.25 mg subcutaneously every 7 days for 28 days, THEN 0.5 mg every 7 days for 28 days.     [facility] - [place], [place] - [address]

## 2025-04-09 ENCOUNTER — APPOINTMENT (OUTPATIENT)
Dept: INTERPRETER SERVICES | Facility: CLINIC | Age: 35
End: 2025-04-09
Payer: COMMERCIAL

## 2025-04-09 NOTE — TELEPHONE ENCOUNTER
Called patient with [facility] to relay provider recommendation below.  Instruction of medication to be used provided several times to ensure patient understood instruction. Patient wrote down instruction on paper.  Future appointment with MTM reiterate.      Tosin Lindo MD Physician Signed4/5/2025       Please call pt and let her know that I've sent a new prescription for her to use for diabetes shot, like we had discussed at her appointment a few weeks ago (she was going to use up currently supply before switching).  Like the Bydureon that she's on now, it's a once weekly shot.  The dose will increase over time. It will be 0.25mg weekly x4 weeks, then 0.5mg weekly. Plan to keep appointment with diabetes educator in May as scheduled to see how things are going on new med.             Carlo Ramirez RN  [facility]

## 2025-05-01 LAB — RETINOPATHY: NEGATIVE

## 2025-05-14 ENCOUNTER — ALLIED HEALTH/NURSE VISIT (OUTPATIENT)
Dept: EDUCATION SERVICES | Facility: CLINIC | Age: 35
End: 2025-05-14
Payer: COMMERCIAL

## 2025-05-14 DIAGNOSIS — E11.9 TYPE 2 DIABETES MELLITUS WITHOUT COMPLICATION, WITHOUT LONG-TERM CURRENT USE OF INSULIN (H): Primary | ICD-10-CM

## 2025-05-14 PROCEDURE — 99207 PR NO SHOW FOR SCHEDULED APPT: CPT | Performed by: DIETITIAN, REGISTERED

## 2025-05-14 NOTE — LETTER
5/14/2025         RE: Carlos Ivan  [address]  [place] [address]        Dear Colleague,    Thank you for [facility] your patient, Carlos Ivan, [facility] Please see a copy of my visit note below.    Patient was a no show for today's appointment.     Patient was contacted to reschedule.     Action taken: follow-up orders placed, sent H.BLOOM message to call [phone] or use H.BLOOM to reschedule, and have also requested [facility] scheduling team to call patient for follow up.

## 2025-05-14 NOTE — PROGRESS NOTES
Patient was a no show for today's appointment.     Patient was contacted to reschedule.     Action taken: follow-up orders placed, sent Rypple message to call [phone] or use Rypple to reschedule, and have also requested [facility] scheduling team to call patient for follow up.

## 2025-05-17 ENCOUNTER — HEALTH MAINTENANCE LETTER (OUTPATIENT)
Age: 35
End: 2025-05-17

## 2025-05-22 ENCOUNTER — TELEPHONE (OUTPATIENT)
Dept: FAMILY MEDICINE | Facility: CLINIC | Age: 35
End: 2025-05-22
Payer: COMMERCIAL

## 2025-05-27 ENCOUNTER — TELEPHONE (OUTPATIENT)
Dept: FAMILY MEDICINE | Facility: CLINIC | Age: 35
End: 2025-05-27

## 2025-05-27 NOTE — TELEPHONE ENCOUNTER
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 Patient is asking for someone to help call her back to help reschedule her appointment that was cancelled for tomorrow.    Could we send this information to you in [facility] or would you prefer to receive a phone call?:   Patient would prefer a phone call   Okay to leave a detailed message?: Yes at Home number on file [phone] (home)    Call taken on 5/27/2025 at 1:57 PM by Laron Cowan RN

## 2025-06-04 ENCOUNTER — LAB (OUTPATIENT)
Dept: LAB | Facility: CLINIC | Age: 35
End: 2025-06-04
Payer: COMMERCIAL

## 2025-06-04 ENCOUNTER — ALLIED HEALTH/NURSE VISIT (OUTPATIENT)
Dept: EDUCATION SERVICES | Facility: CLINIC | Age: 35
End: 2025-06-04
Payer: COMMERCIAL

## 2025-06-04 VITALS — WEIGHT: 153 LBS | BODY MASS INDEX: 29.42 KG/M2

## 2025-06-04 DIAGNOSIS — E11.9 TYPE 2 DIABETES MELLITUS WITHOUT COMPLICATION, WITHOUT LONG-TERM CURRENT USE OF INSULIN (H): Primary | ICD-10-CM

## 2025-06-04 DIAGNOSIS — E11.9 DIABETES MELLITUS, TYPE 2 (H): ICD-10-CM

## 2025-06-04 DIAGNOSIS — Z13.6 SCREENING FOR CARDIOVASCULAR CONDITION: Primary | ICD-10-CM

## 2025-06-04 LAB
CHOLEST SERPL-MCNC: 176 MG/DL
EST. AVERAGE GLUCOSE BLD GHB EST-MCNC: 126 MG/DL
FASTING STATUS PATIENT QL REPORTED: YES
HBA1C MFR BLD: 6 % (ref 0–5.6)
HDLC SERPL-MCNC: 40 MG/DL
LDLC SERPL CALC-MCNC: 108 MG/DL
NONHDLC SERPL-MCNC: 136 MG/DL
TRIGL SERPL-MCNC: 141 MG/DL

## 2025-06-04 PROCEDURE — 80061 LIPID PANEL: CPT

## 2025-06-04 PROCEDURE — 83036 HEMOGLOBIN GLYCOSYLATED A1C: CPT | Performed by: DIETITIAN, REGISTERED

## 2025-06-04 PROCEDURE — 3044F HG A1C LEVEL LT 7.0%: CPT | Performed by: DIETITIAN, REGISTERED

## 2025-06-04 PROCEDURE — 36415 COLL VENOUS BLD VENIPUNCTURE: CPT | Performed by: DIETITIAN, REGISTERED

## 2025-06-04 PROCEDURE — G0108 DIAB MANAGE TRN  PER INDIV: HCPCS | Performed by: DIETITIAN, REGISTERED

## 2025-06-04 NOTE — LETTER
6/4/2025         RE: Carlos Ivan  [address]  [place] [address]        Dear Colleague,    Thank you for referring your patient, Carlos Ivan, [facility]. Please see a copy of my visit note below.    Diabetes Self-Management Education & Support    Presents for:      Type of Service: In Person Visit      Assessment  Follow up visit, conducted with the help of an   Pt endorses polydipsia.  Unable to fully assess - no SMBG data available. We will draw A1c today    A1c comes back significantly improved.  6.0 (6/4) < 7.0 (2/5) > 6.9 (10/1) < 9.5 (3/4/24), reviewed results with pt and congratulated her.   She has been focusing on eating healthfully (no more lemonade, more veg, less rice) and regular activity. Has also lost some weight.       Current wt: 153 lbs (6/4/25) <157 lb (2/5) >153 lb (11/13/24)     Current meds:   Metformin 2000 mg daily and Jardiance 25 mg daily and 0.5 mg Ozempic weekly (on Sundays)  Presents with her med vials and ozempic pen.   Shares that she has run out of ozempic and would need refills  - have communicated with Dr. Lindo about this       SMBG:  Tests ~ 1-x/d, a few times a week (not daily):  FBG ranges     Endorses decreased appetite and an irregular meal pattern   Reports heart palpitations 2-3x/wk this past week - felt better when she ate something  Discussed s/s of hypoglycemia and treatment: pt expressed understanding  Encouraged pt to test BG when experiencing these s/s but to go ahead and treat anyway if unable to test for any reason.    Also discussed the importance of eating regular meals with consistent CHO, encouraged eating a smaller meal/snack if low appetite and NOT skip - pt expressed understanding.     ++ pt has a PCP appt on 7/1 - we can reassess for med adjustment     Activity: goes to the park with her 1 y/o son daily/ ~ 6x/wk, also likes to go fishing    Patient's most recent   Lab Results   Component Value  "Date    A1C 7.0 02/05/2025     is not meeting goal of <7.0    Care Plan and Education Provided:  Healthy Eating: Consistency in amount and timing of carbohydrate intake and Portion control  Being Active: Finding a physical activity routine that works for you  Monitoring: Individual glucose targets, Log and interpret results, and Purpose  Problem Solving: High glucose - causes, signs/symptoms, treatment and prevention, Low glucose - causes, signs/symptoms, treatment and prevention, and Rule of 15 and carrying a carbohydrate source at all times in case of low glucose  Reducing Risks: Goal for A1c, how it relates to glucose and how often to check    Patient verbalized understanding of diabetes self-management education concepts discussed, opportunities for ongoing education and support, and recommendations provided today.    Plan    Continue with current therapy.  Reassess for med adjustment at next PCP visit on 7/1     Instructed pt to always bring their meter, BG log and med vials to all clinic visits - pt expressed understanding     Topics to cover at upcoming visits: Problem Solving and Reducing Risks    See Care Plan for co-developed, patient-state behavior change goals.    Education Materials Provided:  No new materials provided today      Subjective/Objective  Carlos is an 34 year old, presenting for the following diabetes education related to:       Cultural Influences/Ethnic Background:  Not  or     Diabetes Symptoms & Complications:    Patient Problem List and Family Medical History reviewed for relevant medical history, current medical status, and diabetes risk factors.    Vitals:  There were no vitals taken for this visit.  Estimated body mass index is 30.19 kg/m  as calculated from the following:    Height as of 3/12/25: 1.536 m (5' 0.47\").    Weight as of 3/12/25: 71.2 kg (157 lb).   Last 3 BP:   BP Readings from Last 3 Encounters:   03/12/25 100/67   11/13/24 100/70   10/03/24 102/71 "       History   Smoking Status     Never   Smokeless Tobacco     Current     Types: Chew       Labs:  Lab Results   Component Value Date    A1C 7.0 02/05/2025     Lab Results   Component Value Date     09/17/2024     09/17/2024     02/08/2021     Lab Results   Component Value Date     04/04/2024     Direct Measure HDL   Date Value Ref Range Status   04/04/2024 37 (L) >=50 mg/dL Final     GFR Estimate   Date Value Ref Range Status   09/17/2024 >90 >60 mL/min/1.73m2 Final     Comment:     eGFR calculated using 2021 CKD-EPI equation.   02/08/2021 >60 >60 mL/min/1.73m2 Final     GFR Estimate If Black   Date Value Ref Range Status   02/08/2021 >60 >60 mL/min/1.73m2 Final     Lab Results   Component Value Date    CR 0.73 09/17/2024     Lab Results   Component Value Date    MICROL 30.9 03/12/2025    UMALCR 29.71 (H) 03/12/2025    UCRR 104.0 03/12/2025 5/23/2024   Healthy Eating   Healthy Eating Assessed Today Yes   Meals include Breakfast;Dinner   Breakfast mostly rice, vegetables, some meat   Dinner similiar to morning meal   Other Carlos has cut back on her rice portions.   Beverages Water       broth   Has patient met with a dietitian in the past? Yes         5/23/2024   Being Active   Being Active Assessed Today Yes       if she feels energized, she will play with her kids   Exercise: Currently not exercising         5/23/2024   Monitoring   Monitoring Assessed Today Yes       patient instructed on accu check guide me: [id number]   Blood Glucose Meter Unknown   Times checking blood sugar at home (number) 2       Diabetes Medication(s)       Biguanides       metFORMIN (GLUCOPHAGE XR) 500 MG 24 hr tablet Take 2 tablets (1,000 mg) by mouth 2 times daily (with meals).       Sodium-Glucose Co-Transporter 2 (SGLT2) Inhibitors       empagliflozin (JARDIANCE) 25 MG TABS tablet Take 1 tablet (25 mg) by mouth daily.              5/23/2024   Taking Medications   Taking Medication Assessed Today Yes    Current Treatments Oral Medication (taken by mouth)   Problems taking diabetes medications regularly? No         5/23/2024   Problem Solving   Problem Solving Assessed Today Yes   Is the patient at risk for hypoglycemia? No           5/23/2024   Reducing Risks   Reducing Risks Assessed Today Yes   Diabetes Risks Ethnicity;Sedentary Lifestyle   Has dilated eye exam at least once a year? Yes   Sees dentist every 6 months? No       MAXIME Salomon RDN, [facility]  Diabetes Care and Education  [phone] (Triage)     Time Spent: 60 minutes  Encounter Type: Individual    Any diabetes medication dose changes were made via the [facility] Standing Orders under the patient's referring provider.

## 2025-06-04 NOTE — PROGRESS NOTES
Diabetes Self-Management Education & Support    Presents for:      Type of Service: In Person Visit      Assessment  Follow up visit, conducted with the help of an   Pt endorses polydipsia.  Unable to fully assess - no SMBG data available. We will draw A1c today    A1c comes back significantly improved.  6.0 (6/4) < 7.0 (2/5) > 6.9 (10/1) < 9.5 (3/4/24), reviewed results with pt and congratulated her.   She has been focusing on eating healthfully (no more lemonade, more veg, less rice) and regular activity. Has also lost some weight.       Current wt: 153 lbs (6/4/25) <157 lb (2/5) >153 lb (11/13/24)     Current meds:   Metformin 2000 mg daily and Jardiance 25 mg daily and 0.5 mg Ozempic weekly (on Sundays)  Presents with her med vials and ozempic pen.   Shares that she has run out of ozempic and would need refills  - have communicated with Dr. Lindo about this       SMBG:  Tests ~ 1-x/d, a few times a week (not daily):  FBG ranges     Endorses decreased appetite and an irregular meal pattern   Reports heart palpitations 2-3x/wk this past week - felt better when she ate something  Discussed s/s of hypoglycemia and treatment: pt expressed understanding  Encouraged pt to test BG when experiencing these s/s but to go ahead and treat anyway if unable to test for any reason.    Also discussed the importance of eating regular meals with consistent CHO, encouraged eating a smaller meal/snack if low appetite and NOT skip - pt expressed understanding.     ++ pt has a PCP appt on 7/1 - we can reassess for med adjustment     Activity: goes to the park with her 1 y/o son daily/ ~ 6x/wk, also likes to go fishing    Patient's most recent   Lab Results   Component Value Date    A1C 7.0 02/05/2025     is not meeting goal of <7.0    Care Plan and Education Provided:  Healthy Eating: Consistency in amount and timing of carbohydrate intake and Portion control  Being Active: Finding a physical activity routine that  "works for you  Monitoring: Individual glucose targets, Log and interpret results, and Purpose  Problem Solving: High glucose - causes, signs/symptoms, treatment and prevention, Low glucose - causes, signs/symptoms, treatment and prevention, and Rule of 15 and carrying a carbohydrate source at all times in case of low glucose  Reducing Risks: Goal for A1c, how it relates to glucose and how often to check    Patient verbalized understanding of diabetes self-management education concepts discussed, opportunities for ongoing education and support, and recommendations provided today.    Plan    Continue with current therapy.  Reassess for med adjustment at next PCP visit on 7/1     Instructed pt to always bring their meter, BG log and med vials to all clinic visits - pt expressed understanding     Topics to cover at upcoming visits: Problem Solving and Reducing Risks    See Care Plan for co-developed, patient-state behavior change goals.    Education Materials Provided:  No new materials provided today      Subjective/Objective  Carlos is an 34 year old, presenting for the following diabetes education related to:       Cultural Influences/Ethnic Background:  Not  or     Diabetes Symptoms & Complications:    Patient Problem List and Family Medical History reviewed for relevant medical history, current medical status, and diabetes risk factors.    Vitals:  There were no vitals taken for this visit.  Estimated body mass index is 30.19 kg/m  as calculated from the following:    Height as of 3/12/25: 1.536 m (5' 0.47\").    Weight as of 3/12/25: 71.2 kg (157 lb).   Last 3 BP:   BP Readings from Last 3 Encounters:   03/12/25 100/67   11/13/24 100/70   10/03/24 102/71       History   Smoking Status    Never   Smokeless Tobacco    Current    Types: Chew       Labs:  Lab Results   Component Value Date    A1C 7.0 02/05/2025     Lab Results   Component Value Date     09/17/2024     09/17/2024     " 02/08/2021     Lab Results   Component Value Date     04/04/2024     Direct Measure HDL   Date Value Ref Range Status   04/04/2024 37 (L) >=50 mg/dL Final     GFR Estimate   Date Value Ref Range Status   09/17/2024 >90 >60 mL/min/1.73m2 Final     Comment:     eGFR calculated using 2021 CKD-EPI equation.   02/08/2021 >60 >60 mL/min/1.73m2 Final     GFR Estimate If Black   Date Value Ref Range Status   02/08/2021 >60 >60 mL/min/1.73m2 Final     Lab Results   Component Value Date    CR 0.73 09/17/2024     Lab Results   Component Value Date    MICROL 30.9 03/12/2025    UMALCR 29.71 (H) 03/12/2025    UCRR 104.0 03/12/2025 5/23/2024   Healthy Eating   Healthy Eating Assessed Today Yes   Meals include Breakfast;Dinner   Breakfast mostly rice, vegetables, some meat   Dinner similiar to morning meal   Other Carlos has cut back on her rice portions.   Beverages Water       broth   Has patient met with a dietitian in the past? Yes         5/23/2024   Being Active   Being Active Assessed Today Yes       if she feels energized, she will play with her kids   Exercise: Currently not exercising         5/23/2024   Monitoring   Monitoring Assessed Today Yes       patient instructed on accu check guide me: [id number]   Blood Glucose Meter Unknown   Times checking blood sugar at home (number) 2       Diabetes Medication(s)       Biguanides       metFORMIN (GLUCOPHAGE XR) 500 MG 24 hr tablet Take 2 tablets (1,000 mg) by mouth 2 times daily (with meals).       Sodium-Glucose Co-Transporter 2 (SGLT2) Inhibitors       empagliflozin (JARDIANCE) 25 MG TABS tablet Take 1 tablet (25 mg) by mouth daily.              5/23/2024   Taking Medications   Taking Medication Assessed Today Yes   Current Treatments Oral Medication (taken by mouth)   Problems taking diabetes medications regularly? No         5/23/2024   Problem Solving   Problem Solving Assessed Today Yes   Is the patient at risk for hypoglycemia? No           5/23/2024    Reducing Risks   Reducing Risks Assessed Today Yes   Diabetes Risks Ethnicity;Sedentary Lifestyle   Has dilated eye exam at least once a year? Yes   Sees dentist every 6 months? No       MAXIME Salomon RDN, [facility]  Diabetes Care and Education  [phone] (Triage)     Time Spent: 60 minutes  Encounter Type: Individual    Any diabetes medication dose changes were made via the [facility] Standing Orders under the patient's referring provider.

## 2025-06-09 ENCOUNTER — RESULTS FOLLOW-UP (OUTPATIENT)
Dept: FAMILY MEDICINE | Facility: CLINIC | Age: 35
End: 2025-06-09

## 2025-07-01 ENCOUNTER — PATIENT OUTREACH (OUTPATIENT)
Dept: CARE COORDINATION | Facility: CLINIC | Age: 35
End: 2025-07-01

## 2025-07-01 ENCOUNTER — OFFICE VISIT (OUTPATIENT)
Dept: FAMILY MEDICINE | Facility: CLINIC | Age: 35
End: 2025-07-01
Attending: FAMILY MEDICINE
Payer: COMMERCIAL

## 2025-07-01 VITALS
RESPIRATION RATE: 16 BRPM | HEIGHT: 60 IN | TEMPERATURE: 98.1 F | BODY MASS INDEX: 29.64 KG/M2 | DIASTOLIC BLOOD PRESSURE: 74 MMHG | WEIGHT: 151 LBS | HEART RATE: 74 BPM | SYSTOLIC BLOOD PRESSURE: 98 MMHG | OXYGEN SATURATION: 98 %

## 2025-07-01 DIAGNOSIS — E11.9 TYPE 2 DIABETES MELLITUS WITHOUT COMPLICATION, WITHOUT LONG-TERM CURRENT USE OF INSULIN (H): Primary | ICD-10-CM

## 2025-07-01 DIAGNOSIS — E78.2 MIXED HYPERLIPIDEMIA: ICD-10-CM

## 2025-07-01 DIAGNOSIS — J45.20 MILD INTERMITTENT ASTHMA WITHOUT COMPLICATION: ICD-10-CM

## 2025-07-01 PROCEDURE — 3078F DIAST BP <80 MM HG: CPT | Performed by: FAMILY MEDICINE

## 2025-07-01 PROCEDURE — 3074F SYST BP LT 130 MM HG: CPT | Performed by: FAMILY MEDICINE

## 2025-07-01 PROCEDURE — 1125F AMNT PAIN NOTED PAIN PRSNT: CPT | Performed by: FAMILY MEDICINE

## 2025-07-01 PROCEDURE — 99214 OFFICE O/P EST MOD 30 MIN: CPT | Performed by: FAMILY MEDICINE

## 2025-07-01 PROCEDURE — G2211 COMPLEX E/M VISIT ADD ON: HCPCS | Performed by: FAMILY MEDICINE

## 2025-07-01 RX ORDER — ALBUTEROL SULFATE 90 UG/1
2 INHALANT RESPIRATORY (INHALATION) EVERY 4 HOURS PRN
Qty: 18 G | Refills: 1 | Status: SHIPPED | OUTPATIENT
Start: 2025-07-01

## 2025-07-01 RX ORDER — ATORVASTATIN CALCIUM 10 MG/1
10 TABLET, FILM COATED ORAL DAILY
Qty: 30 TABLET | Refills: 11 | Status: SHIPPED | OUTPATIENT
Start: 2025-07-01

## 2025-07-01 ASSESSMENT — ASTHMA QUESTIONNAIRES
QUESTION_2 LAST FOUR WEEKS HOW OFTEN HAVE YOU HAD SHORTNESS OF BREATH: ONCE OR TWICE A WEEK
QUESTION_5 LAST FOUR WEEKS HOW WOULD YOU RATE YOUR ASTHMA CONTROL: WELL CONTROLLED
QUESTION_3 LAST FOUR WEEKS HOW OFTEN DID YOUR ASTHMA SYMPTOMS (WHEEZING, COUGHING, SHORTNESS OF BREATH, CHEST TIGHTNESS OR PAIN) WAKE YOU UP AT NIGHT OR EARLIER THAN USUAL IN THE MORNING: ONCE A WEEK
QUESTION_4 LAST FOUR WEEKS HOW OFTEN HAVE YOU USED YOUR RESCUE INHALER OR NEBULIZER MEDICATION (SUCH AS ALBUTEROL): ONCE A WEEK OR LESS
QUESTION_1 LAST FOUR WEEKS HOW MUCH OF THE TIME DID YOUR ASTHMA KEEP YOU FROM GETTING AS MUCH DONE AT WORK, SCHOOL OR AT HOME: A LITTLE OF THE TIME
ACT_TOTALSCORE: 19

## 2025-07-01 ASSESSMENT — PAIN SCALES - GENERAL: PAINLEVEL_OUTOF10: MODERATE PAIN (6)

## 2025-07-01 NOTE — PROGRESS NOTES
Assessment & Plan     Type 2 diabetes mellitus without complication, without long-term current use of insulin (H)    Stable.    Recheck in 3 months, with Dr. Lindo.      - PRIMARY CARE FOLLOW-UP SCHEDULING    Mixed hyperlipidemia    Not at goal.    Begin atorvastatin.    - atorvastatin (LIPITOR) 10 MG tablet  Dispense: 30 tablet; Refill: 11    Mild intermittent asthma without complication    Stable.    - albuterol (PROAIR HFA/PROVENTIL HFA/VENTOLIN HFA) 108 (90 Base) MCG/ACT inhaler  Dispense: 18 g; Refill: 1      The longitudinal plan of care for the diagnosis(es)/condition(s) as documented were addressed during this visit. Due to the added complexity in care, I will continue to support Carlos in the subsequent management and with ongoing continuity of care.      Prescription drug management        BMI  Estimated body mass index is 29.49 kg/m  as calculated from the following:    Height as of this encounter: 1.524 m (5').    Weight as of this encounter: 68.5 kg (151 lb).           Enedelia Gonzalez is a 34 year old, presenting for the following health issues:  Diabetes and Asthma (Patient states that using her inhaler only helps with coughing temporarily. )      7/1/2025     8:39 AM   Additional Questions   Roomed by Vikki BURRIS   Accompanied by Self     History of Present Illness       Diabetes:   She presents for follow up of diabetes.  She is checking home blood glucose two times daily.   She checks blood glucose before and after meals.  Blood glucose is never over 200 and never under 70. She is aware of hypoglycemia symptoms including shakiness.    She has no concerns regarding her diabetes at this time.   She is not experiencing numbness or burning in feet, excessive thirst, blurry vision, weight changes or redness, sores or blisters on feet.        She is missing 3 dose(s) of medications per week.  She is not taking prescribed medications regularly due to remembering to take.      She uses Flovent  occasionally.    A1c was 6.0 on 6/4/25.    LDL was 108.    Current Outpatient Medications   Medication Sig Dispense Refill    ACCU-CHEK GUIDE test strip Use to test blood sugar two times daily. 100 strip 6    albuterol (PROAIR HFA/PROVENTIL HFA/VENTOLIN HFA) 108 (90 Base) MCG/ACT inhaler Inhale 2 puffs into the lungs every 4 hours as needed for shortness of breath, wheezing or cough. 18 g 1    atorvastatin (LIPITOR) 10 MG tablet Take 1 tablet (10 mg) by mouth daily. 30 tablet 11    blood glucose monitoring (SOFTCLIX) lancets Use to test blood sugar two times daily. 100 each 4    empagliflozin (JARDIANCE) 25 MG TABS tablet Take 1 tablet (25 mg) by mouth daily. 30 tablet 11    etonogestrel (NEXPLANON) 68 MG IMPL 1 each (68 mg) by Subdermal route once      ibuprofen (ADVIL/MOTRIN) 600 MG tablet Take 1 tablet (600 mg) by mouth every 8 hours as needed for moderate pain. 50 tablet 1    lisinopril (ZESTRIL) 2.5 MG tablet Take 1 tablet (2.5 mg) by mouth daily. To protect kidneys. 90 tablet 3    metFORMIN (GLUCOPHAGE XR) 500 MG 24 hr tablet Take 2 tablets (1,000 mg) by mouth 2 times daily (with meals). 360 tablet 4    semaglutide (OZEMPIC, 0.25 OR 0.5 MG/DOSE,) 2 MG/3ML pen Inject 0.5 mg subcutaneously every 7 days. 3 mL 3    emollient (VANICREAM) external cream Apply topically 2 times daily (Patient not taking: Reported on 7/1/2025) 453 g 1    triamcinolone (KENALOG) 0.1 % external cream Apply topically 2 times daily as needed (itching). (Patient not taking: Reported on 7/1/2025) 30 g 1    triamcinolone (KENALOG) 0.1 % external cream Apply topically 2 times daily (Patient not taking: Reported on 7/1/2025) 30 g 1                   Objective    BP 98/74   Pulse 74   Temp 98.1  F (36.7  C) (Oral)   Resp 16   Ht 1.524 m (5')   Wt 68.5 kg (151 lb)   SpO2 98%   BMI 29.49 kg/m    Body mass index is 29.49 kg/m .  Physical Exam     Heart normal  Lungs normal  Abdomen normal            Signed Electronically by: Silas SHOOK  MD Jackelin

## 2025-07-15 ENCOUNTER — PATIENT OUTREACH (OUTPATIENT)
Dept: CARE COORDINATION | Facility: CLINIC | Age: 35
End: 2025-07-15
Payer: COMMERCIAL

## 2025-07-29 NOTE — PROGRESS NOTES
CHIEF COMPLAINT: Patient presents with:  Tinnitus:  PT reports tinnitus in her right ear only that started about 2 months ago. PT denies any balance or dizzy issues.          HISTORY OF PRESENT ILLNESS    Carlos was seen at the behest of Tosin Lindo for hearing concern.    AUDIOLOGY NOTE:    HX: Accompanied by Aura  and family member. Referred by Tosin Lindo MD, due to Tinnitus, right. Pt reports right pulsatile tinnitus began months ago. States tinnitus is bothersome and sounds like her heart beat. She does have occasional dizziness, but denies vertigo. She denies otalgia, otorrhea, aural fullness, loud noise exposure, and hx of ear surgery. RESULTS: Otoscopy: Clear canals bilat. Tymps: Normal eardrum mobility bilat. Reflexes: Present in ipsi conditions. CNT contra due to equipment limitations. Audio: Normal hearing sensitivity bilat. Word rec: Presented via  using 9 word Aura list. 9/9 bilat. REC: Follow up with ENT as scheduled.             REVIEW OF SYSTEMS    Review of Systems as per HPI and PMHx, otherwise 10 system review system are negative.       ALLERGIES    Patient has no known allergies.    CURRENT MEDICATIONS      Current Outpatient Medications:     ACCU-CHEK GUIDE test strip, Use to test blood sugar two times daily., Disp: 100 strip, Rfl: 6    albuterol (PROAIR HFA/PROVENTIL HFA/VENTOLIN HFA) 108 (90 Base) MCG/ACT inhaler, Inhale 2 puffs into the lungs every 4 hours as needed for shortness of breath, wheezing or cough., Disp: 18 g, Rfl: 1    atorvastatin (LIPITOR) 10 MG tablet, Take 1 tablet (10 mg) by mouth daily., Disp: 30 tablet, Rfl: 11    blood glucose monitoring (SOFTCLIX) lancets, Use to test blood sugar two times daily., Disp: 100 each, Rfl: 4    emollient (VANICREAM) external cream, Apply topically 2 times daily (Patient not taking: Reported on 7/1/2025), Disp: 453 g, Rfl: 1    empagliflozin (JARDIANCE) 25 MG TABS tablet, Take 1 tablet (25 mg) by mouth daily.,  Disp: 30 tablet, Rfl: 11    etonogestrel (NEXPLANON) 68 MG IMPL, 1 each (68 mg) by Subdermal route once, Disp: , Rfl:     ibuprofen (ADVIL/MOTRIN) 600 MG tablet, Take 1 tablet (600 mg) by mouth every 8 hours as needed for moderate pain., Disp: 50 tablet, Rfl: 1    lisinopril (ZESTRIL) 2.5 MG tablet, Take 1 tablet (2.5 mg) by mouth daily. To protect kidneys., Disp: 90 tablet, Rfl: 3    metFORMIN (GLUCOPHAGE XR) 500 MG 24 hr tablet, Take 2 tablets (1,000 mg) by mouth 2 times daily (with meals)., Disp: 360 tablet, Rfl: 4    semaglutide (OZEMPIC, 0.25 OR 0.5 MG/DOSE,) 2 MG/3ML pen, Inject 0.5 mg subcutaneously every 7 days., Disp: 3 mL, Rfl: 3    triamcinolone (KENALOG) 0.1 % external cream, Apply topically 2 times daily as needed (itching). (Patient not taking: Reported on 7/1/2025), Disp: 30 g, Rfl: 1    triamcinolone (KENALOG) 0.1 % external cream, Apply topically 2 times daily (Patient not taking: Reported on 7/1/2025), Disp: 30 g, Rfl: 1     PAST MEDICAL HISTORY    PAST MEDICAL HISTORY:   Past Medical History:   Diagnosis Date    History of stillbirth 01/31/2017    At 38 weeks gestation she had stillbirth.  On 6/8/16      Implantable subdermal contraceptive surveillance 07/19/2019    Uncomplicated asthma        PAST SURGICAL HISTORY    PAST SURGICAL HISTORY: No past surgical history on file.    FAMILY  HISTORY    FAMILY HISTORY:   Family History   Problem Relation Age of Onset    Asthma Mother     Other - See Comments Mother         unknown cause of death; was having trouble breathing    Migraines Sister     Migraines Brother     No Known Problems Daughter     Asthma Daughter     Stillborn Daughter     No Known Problems Son        SOCIAL HISTORY    SOCIAL HISTORY:   Social History     Tobacco Use    Smoking status: Never     Passive exposure: Current    Smokeless tobacco: Current     Types: Chew    Tobacco comments:     pt chews bitt[place]ut with tobacco in it for more than 10 years.  smokes outside.    Substance Use Topics    Alcohol use: Not Currently     Comment: once in a while        PHYSICAL EXAM    HEAD: Normal appearance and symmetry:  No cutaneous lesions.      NECK:  supple     EARS:    Right:   TM intact nl; pulsating mass anterior/inferior aspect of middle ear (see video)            LEFT:   TM intact nl     EYES:  EOMI    CN VII/XII:  intact     NOSE:     Dorsum:   straight  Septum:  midline  Mucosa:  moist        ORAL CAVITY/OROPHARYNX:     Lips:  Normal.  Tongue: normal, midline  Mucosa:   no lesions     NECK:  Trachea:  midline.              Thyroid:  normal              Adenopathy:  none        NEURO:   Alert and Oriented     GAIT AND STATION:  normal     RESPIRATORY:   Symmetry and Respiratory effort     PSYCH:  Normal mood and affect     SKIN:   warm and dry         IMPRESSION:    Encounter Diagnoses   Name Primary?    Tinnitus, right     Normal hearing noted on examination Yes    Pulsatile tinnitus of right ear     Glomus tympanicum tumor           RECOMMENDATIONS:      Orders Placed This Encounter   Procedures    BINOCULAR MICROSCOPY    MR Brain and Orbits w/o & w Contrast      Will send for for stat CT temporal bones.   Will likely need additional imaging, will discuss with neuroradiology

## 2025-07-30 ENCOUNTER — OFFICE VISIT (OUTPATIENT)
Dept: OTOLARYNGOLOGY | Facility: CLINIC | Age: 35
End: 2025-07-30
Attending: FAMILY MEDICINE
Payer: COMMERCIAL

## 2025-07-30 ENCOUNTER — OFFICE VISIT (OUTPATIENT)
Dept: AUDIOLOGY | Facility: CLINIC | Age: 35
End: 2025-07-30
Attending: FAMILY MEDICINE
Payer: COMMERCIAL

## 2025-07-30 DIAGNOSIS — H93.11 TINNITUS, RIGHT: ICD-10-CM

## 2025-07-30 DIAGNOSIS — D18.09 GLOMUS TYMPANICUM TUMOR: ICD-10-CM

## 2025-07-30 DIAGNOSIS — F40.240 CLAUSTROPHOBIA: ICD-10-CM

## 2025-07-30 DIAGNOSIS — H93.11 TINNITUS OF RIGHT EAR: Primary | ICD-10-CM

## 2025-07-30 DIAGNOSIS — H93.A1 PULSATILE TINNITUS OF RIGHT EAR: ICD-10-CM

## 2025-07-30 DIAGNOSIS — Z01.10 NORMAL HEARING NOTED ON EXAMINATION: Primary | ICD-10-CM

## 2025-07-30 PROCEDURE — 92550 TYMPANOMETRY & REFLEX THRESH: CPT | Mod: 52 | Performed by: AUDIOLOGIST

## 2025-07-30 PROCEDURE — 92557 COMPREHENSIVE HEARING TEST: CPT | Performed by: AUDIOLOGIST

## 2025-07-30 RX ORDER — DIAZEPAM 10 MG/1
10 TABLET ORAL ONCE
Qty: 1 TABLET | Refills: 0 | Status: SHIPPED | OUTPATIENT
Start: 2025-07-30 | End: 2025-07-30

## 2025-07-30 NOTE — LETTER
7/30/2025      Carlos Ivan  [address]  [facility] [address]      Dear Colleague,    Thank you for referring your patient, Carlos Ivan, to the [facility]. Please see a copy of my visit note below.    CHIEF COMPLAINT: Patient presents with:  Tinnitus:  PT reports tinnitus in her right ear only that started about 2 months ago. PT denies any balance or dizzy issues.          HISTORY OF PRESENT ILLNESS    Carlos was seen at the behest of Tosin Lindo for hearing concern.    AUDIOLOGY NOTE:    HX: Accompanied by Aura  and family member. Referred by Tosin Lindo MD, due to Tinnitus, right. Pt reports right pulsatile tinnitus began months ago. States tinnitus is bothersome and sounds like her heart beat. She does have occasional dizziness, but denies vertigo. She denies otalgia, otorrhea, aural fullness, loud noise exposure, and hx of ear surgery. RESULTS: Otoscopy: Clear canals bilat. Tymps: Normal eardrum mobility bilat. Reflexes: Present in ipsi conditions. CNT contra due to equipment limitations. Audio: Normal hearing sensitivity bilat. Word rec: Presented via  using 9 word Aura list. 9/9 bilat. REC: Follow up with ENT as scheduled.             REVIEW OF SYSTEMS    Review of Systems as per HPI and PMHx, otherwise 10 system review system are negative.       ALLERGIES    Patient has no known allergies.    CURRENT MEDICATIONS      Current Outpatient Medications:      ACCU-CHEK GUIDE test strip, Use to test blood sugar two times daily., Disp: 100 strip, Rfl: 6     albuterol (PROAIR HFA/PROVENTIL HFA/VENTOLIN HFA) 108 (90 Base) MCG/ACT inhaler, Inhale 2 puffs into the lungs every 4 hours as needed for shortness of breath, wheezing or cough., Disp: 18 g, Rfl: 1     atorvastatin (LIPITOR) 10 MG tablet, Take 1 tablet (10 mg) by mouth daily., Disp: 30 tablet, Rfl: 11     blood glucose monitoring (SOFTCLIX) lancets, Use to test blood sugar two times daily., Disp: 100  each, Rfl: 4     emollient (VANICREAM) external cream, Apply topically 2 times daily (Patient not taking: Reported on 7/1/2025), Disp: 453 g, Rfl: 1     empagliflozin (JARDIANCE) 25 MG TABS tablet, Take 1 tablet (25 mg) by mouth daily., Disp: 30 tablet, Rfl: 11     etonogestrel (NEXPLANON) 68 MG IMPL, 1 each (68 mg) by Subdermal route once, Disp: , Rfl:      ibuprofen (ADVIL/MOTRIN) 600 MG tablet, Take 1 tablet (600 mg) by mouth every 8 hours as needed for moderate pain., Disp: 50 tablet, Rfl: 1     lisinopril (ZESTRIL) 2.5 MG tablet, Take 1 tablet (2.5 mg) by mouth daily. To protect kidneys., Disp: 90 tablet, Rfl: 3     metFORMIN (GLUCOPHAGE XR) 500 MG 24 hr tablet, Take 2 tablets (1,000 mg) by mouth 2 times daily (with meals)., Disp: 360 tablet, Rfl: 4     semaglutide (OZEMPIC, 0.25 OR 0.5 MG/DOSE,) 2 MG/3ML pen, Inject 0.5 mg subcutaneously every 7 days., Disp: 3 mL, Rfl: 3     triamcinolone (KENALOG) 0.1 % external cream, Apply topically 2 times daily as needed (itching). (Patient not taking: Reported on 7/1/2025), Disp: 30 g, Rfl: 1     triamcinolone (KENALOG) 0.1 % external cream, Apply topically 2 times daily (Patient not taking: Reported on 7/1/2025), Disp: 30 g, Rfl: 1     PAST MEDICAL HISTORY    PAST MEDICAL HISTORY:   Past Medical History:   Diagnosis Date     History of stillbirth 01/31/2017    At 38 weeks gestation she had stillbirth.  On 6/8/16       Implantable subdermal contraceptive surveillance 07/19/2019     Uncomplicated asthma        PAST SURGICAL HISTORY    PAST SURGICAL HISTORY: No past surgical history on file.    FAMILY  HISTORY    FAMILY HISTORY:   Family History   Problem Relation Age of Onset     Asthma Mother      Other - See Comments Mother         unknown cause of death; was having trouble breathing     Migraines Sister      Migraines Brother      No Known Problems Daughter      Asthma Daughter      Stillborn Daughter      No Known Problems Son        SOCIAL HISTORY    SOCIAL HISTORY:    Social History     Tobacco Use     Smoking status: Never     Passive exposure: Current     Smokeless tobacco: Current     Types: Chew     Tobacco comments:     pt chews [place] with tobacco in it for more than 10 years.  smokes outside.   Substance Use Topics     Alcohol use: Not Currently     Comment: once in a while        PHYSICAL EXAM    HEAD: Normal appearance and symmetry:  No cutaneous lesions.      NECK:  supple     EARS:    Right:   TM intact nl; pulsating mass anterior/inferior aspect of middle ear (see video)            LEFT:   TM intact nl     EYES:  EOMI    CN VII/XII:  intact     NOSE:     Dorsum:   straight  Septum:  midline  Mucosa:  moist        ORAL CAVITY/OROPHARYNX:     Lips:  Normal.  Tongue: normal, midline  Mucosa:   no lesions     NECK:  Trachea:  midline.              Thyroid:  normal              Adenopathy:  none        NEURO:   Alert and Oriented     GAIT AND STATION:  normal     RESPIRATORY:   Symmetry and Respiratory effort     PSYCH:  Normal mood and affect     SKIN:   warm and dry         IMPRESSION:    Encounter Diagnoses   Name Primary?     Tinnitus, right      Normal hearing noted on examination Yes     Pulsatile tinnitus of right ear      Glomus tympanicum tumor           RECOMMENDATIONS:      Orders Placed This Encounter   Procedures     BINOCULAR MICROSCOPY     MR Brain and Orbits w/o & w Contrast      Will send for for stat CT temporal bones.   Will likely need additional imaging, will discuss with neuroradiology    Again, thank you for allowing me to participate in the care of your patient.        Sincerely,        Josué Garsia MD    Electronically signed

## 2025-07-30 NOTE — PROGRESS NOTES
AUDIOLOGY REPORT    SUMMARY: Audiology visit completed. See audiogram for results.      RECOMMENDATIONS: Follow-up with ENT.    Carter Corley, CCC-A  [place] Licensed Audiologist #[id number]

## 2025-09-01 ENCOUNTER — PATIENT OUTREACH (OUTPATIENT)
Dept: CARE COORDINATION | Facility: CLINIC | Age: 35
End: 2025-09-01
Payer: COMMERCIAL